# Patient Record
Sex: FEMALE | Race: WHITE | NOT HISPANIC OR LATINO | Employment: FULL TIME | URBAN - METROPOLITAN AREA
[De-identification: names, ages, dates, MRNs, and addresses within clinical notes are randomized per-mention and may not be internally consistent; named-entity substitution may affect disease eponyms.]

---

## 2018-07-23 ENCOUNTER — OFFICE VISIT CONVERTED (OUTPATIENT)
Dept: SURGERY | Facility: CLINIC | Age: 23
End: 2018-07-23
Attending: SURGERY

## 2018-07-23 ENCOUNTER — CONVERSION ENCOUNTER (OUTPATIENT)
Dept: SURGERY | Facility: CLINIC | Age: 23
End: 2018-07-23

## 2018-08-24 ENCOUNTER — OFFICE VISIT CONVERTED (OUTPATIENT)
Dept: SURGERY | Facility: CLINIC | Age: 23
End: 2018-08-24
Attending: SURGERY

## 2019-05-01 ENCOUNTER — HOSPITAL ENCOUNTER (OUTPATIENT)
Dept: URGENT CARE | Facility: CLINIC | Age: 24
Discharge: HOME | End: 2019-05-01

## 2019-06-03 ENCOUNTER — HOSPITAL ENCOUNTER (OUTPATIENT)
Dept: URGENT CARE | Facility: CLINIC | Age: 24
Discharge: HOME | End: 2019-06-03

## 2019-08-05 ENCOUNTER — HOSPITAL ENCOUNTER (OUTPATIENT)
Dept: URGENT CARE | Facility: CLINIC | Age: 24
Discharge: HOME | End: 2019-08-05

## 2019-11-25 ENCOUNTER — HOSPITAL ENCOUNTER (OUTPATIENT)
Dept: URGENT CARE | Facility: CLINIC | Age: 24
Discharge: HOME | End: 2019-11-25

## 2019-11-27 LAB — BACTERIA SPEC AEROBE CULT: NORMAL

## 2020-01-17 ENCOUNTER — HOSPITAL ENCOUNTER (OUTPATIENT)
Dept: URGENT CARE | Facility: CLINIC | Age: 25
Discharge: HOME | End: 2020-01-17
Attending: FAMILY MEDICINE

## 2020-01-19 LAB — BACTERIA SPEC AEROBE CULT: NORMAL

## 2020-09-01 ENCOUNTER — HOSPITAL ENCOUNTER (OUTPATIENT)
Dept: URGENT CARE | Facility: CLINIC | Age: 25
Discharge: HOME | End: 2020-09-01

## 2020-12-21 ENCOUNTER — HOSPITAL ENCOUNTER (OUTPATIENT)
Dept: URGENT CARE | Facility: CLINIC | Age: 25
Discharge: HOME | End: 2020-12-21

## 2020-12-23 LAB — BACTERIA SPEC AEROBE CULT: NORMAL

## 2021-05-06 ENCOUNTER — HOSPITAL ENCOUNTER (OUTPATIENT)
Dept: URGENT CARE | Facility: CLINIC | Age: 26
Discharge: HOME | End: 2021-05-06
Attending: FAMILY MEDICINE

## 2021-05-16 VITALS — BODY MASS INDEX: 38.73 KG/M2 | WEIGHT: 241 LBS | RESPIRATION RATE: 16 BRPM | HEIGHT: 66 IN

## 2021-05-16 VITALS — BODY MASS INDEX: 38.57 KG/M2 | RESPIRATION RATE: 16 BRPM | WEIGHT: 240 LBS | HEIGHT: 66 IN

## 2021-07-22 ENCOUNTER — APPOINTMENT (OUTPATIENT)
Dept: GENERAL RADIOLOGY | Facility: HOSPITAL | Age: 26
End: 2021-07-22

## 2021-07-22 ENCOUNTER — HOSPITAL ENCOUNTER (EMERGENCY)
Facility: HOSPITAL | Age: 26
Discharge: HOME | End: 2021-07-22
Attending: EMERGENCY MEDICINE | Admitting: EMERGENCY MEDICINE

## 2021-07-22 ENCOUNTER — APPOINTMENT (OUTPATIENT)
Dept: CT IMAGING | Facility: HOSPITAL | Age: 26
End: 2021-07-22

## 2021-07-22 VITALS
SYSTOLIC BLOOD PRESSURE: 115 MMHG | RESPIRATION RATE: 18 BRPM | TEMPERATURE: 98.3 F | WEIGHT: 253.09 LBS | OXYGEN SATURATION: 99 % | DIASTOLIC BLOOD PRESSURE: 68 MMHG | HEART RATE: 92 BPM | BODY MASS INDEX: 40.67 KG/M2 | HEIGHT: 66 IN

## 2021-07-22 DIAGNOSIS — F41.1 ANXIETY REACTION: ICD-10-CM

## 2021-07-22 DIAGNOSIS — R20.2 PARESTHESIAS: ICD-10-CM

## 2021-07-22 DIAGNOSIS — R07.9 CHEST PAIN, UNSPECIFIED TYPE: Primary | ICD-10-CM

## 2021-07-22 LAB
ABO GROUP BLD: NORMAL
ALBUMIN SERPL-MCNC: 4.3 G/DL (ref 3.5–5.2)
ALBUMIN/GLOB SERPL: 1.3 G/DL
ALP SERPL-CCNC: 98 U/L (ref 39–117)
ALT SERPL W P-5'-P-CCNC: 22 U/L (ref 1–33)
ANION GAP SERPL CALCULATED.3IONS-SCNC: 10.3 MMOL/L (ref 5–15)
APTT PPP: 22.8 SECONDS (ref 22.2–34.2)
AST SERPL-CCNC: 18 U/L (ref 1–32)
BACTERIA UR QL AUTO: ABNORMAL /HPF
BASOPHILS # BLD AUTO: 0.02 10*3/MM3 (ref 0–0.2)
BASOPHILS NFR BLD AUTO: 0.2 % (ref 0–1.5)
BILIRUB SERPL-MCNC: 0.3 MG/DL (ref 0–1.2)
BILIRUB UR QL STRIP: NEGATIVE
BLD GP AB SCN SERPL QL: NEGATIVE
BUN SERPL-MCNC: 13 MG/DL (ref 6–20)
BUN/CREAT SERPL: 18.3 (ref 7–25)
CALCIUM SPEC-SCNC: 9.4 MG/DL (ref 8.6–10.5)
CHLORIDE SERPL-SCNC: 101 MMOL/L (ref 98–107)
CLARITY UR: CLEAR
CO2 SERPL-SCNC: 21.7 MMOL/L (ref 22–29)
COLOR UR: YELLOW
CREAT BLDA-MCNC: 0.8 MG/DL
CREAT SERPL-MCNC: 0.71 MG/DL (ref 0.57–1)
DEPRECATED RDW RBC AUTO: 41 FL (ref 37–54)
EOSINOPHIL # BLD AUTO: 0.15 10*3/MM3 (ref 0–0.4)
EOSINOPHIL NFR BLD AUTO: 1.4 % (ref 0.3–6.2)
ERYTHROCYTE [DISTWIDTH] IN BLOOD BY AUTOMATED COUNT: 12.7 % (ref 12.3–15.4)
GFR SERPL CREATININE-BSD FRML MDRD: 101 ML/MIN/1.73
GLOBULIN UR ELPH-MCNC: 3.2 GM/DL
GLUCOSE BLDC GLUCOMTR-MCNC: 83 MG/DL (ref 70–130)
GLUCOSE SERPL-MCNC: 93 MG/DL (ref 65–99)
GLUCOSE UR STRIP-MCNC: NEGATIVE MG/DL
HCG SERPL QL: NEGATIVE
HCT VFR BLD AUTO: 40.4 % (ref 34–46.6)
HGB BLD-MCNC: 13 G/DL (ref 12–15.9)
HGB UR QL STRIP.AUTO: NEGATIVE
HOLD SPECIMEN: NORMAL
HOLD SPECIMEN: NORMAL
HYALINE CASTS UR QL AUTO: ABNORMAL /LPF
IMM GRANULOCYTES # BLD AUTO: 0.05 10*3/MM3 (ref 0–0.05)
IMM GRANULOCYTES NFR BLD AUTO: 0.5 % (ref 0–0.5)
INR PPP: 0.92 (ref 2–3)
KETONES UR QL STRIP: NEGATIVE
LEUKOCYTE ESTERASE UR QL STRIP.AUTO: ABNORMAL
LYMPHOCYTES # BLD AUTO: 2.88 10*3/MM3 (ref 0.7–3.1)
LYMPHOCYTES NFR BLD AUTO: 26.2 % (ref 19.6–45.3)
MCH RBC QN AUTO: 28.4 PG (ref 26.6–33)
MCHC RBC AUTO-ENTMCNC: 32.2 G/DL (ref 31.5–35.7)
MCV RBC AUTO: 88.2 FL (ref 79–97)
MONOCYTES # BLD AUTO: 0.58 10*3/MM3 (ref 0.1–0.9)
MONOCYTES NFR BLD AUTO: 5.3 % (ref 5–12)
NEUTROPHILS NFR BLD AUTO: 66.4 % (ref 42.7–76)
NEUTROPHILS NFR BLD AUTO: 7.32 10*3/MM3 (ref 1.7–7)
NITRITE UR QL STRIP: NEGATIVE
NRBC BLD AUTO-RTO: 0 /100 WBC (ref 0–0.2)
PH UR STRIP.AUTO: <=5 [PH] (ref 5–8)
PLATELET # BLD AUTO: 287 10*3/MM3 (ref 140–450)
PMV BLD AUTO: 10.6 FL (ref 6–12)
POTASSIUM SERPL-SCNC: 4 MMOL/L (ref 3.5–5.2)
PROT SERPL-MCNC: 7.5 G/DL (ref 6–8.5)
PROT UR QL STRIP: NEGATIVE
PROTHROMBIN TIME: 10.2 SECONDS (ref 9.4–12)
RBC # BLD AUTO: 4.58 10*6/MM3 (ref 3.77–5.28)
RBC # UR: ABNORMAL /HPF
REF LAB TEST METHOD: ABNORMAL
RH BLD: POSITIVE
SODIUM SERPL-SCNC: 133 MMOL/L (ref 136–145)
SP GR UR STRIP: >1.03 (ref 1–1.03)
SQUAMOUS #/AREA URNS HPF: ABNORMAL /HPF
T&S EXPIRATION DATE: NORMAL
TROPONIN T SERPL-MCNC: <0.01 NG/ML (ref 0–0.03)
UROBILINOGEN UR QL STRIP: ABNORMAL
WBC # BLD AUTO: 11 10*3/MM3 (ref 3.4–10.8)
WBC UR QL AUTO: ABNORMAL /HPF
WHOLE BLOOD HOLD SPECIMEN: NORMAL

## 2021-07-22 PROCEDURE — 84703 CHORIONIC GONADOTROPIN ASSAY: CPT | Performed by: EMERGENCY MEDICINE

## 2021-07-22 PROCEDURE — 82962 GLUCOSE BLOOD TEST: CPT

## 2021-07-22 PROCEDURE — 84484 ASSAY OF TROPONIN QUANT: CPT | Performed by: EMERGENCY MEDICINE

## 2021-07-22 PROCEDURE — 0 IOPAMIDOL PER 1 ML: Performed by: EMERGENCY MEDICINE

## 2021-07-22 PROCEDURE — 82565 ASSAY OF CREATININE: CPT

## 2021-07-22 PROCEDURE — 80053 COMPREHEN METABOLIC PANEL: CPT | Performed by: EMERGENCY MEDICINE

## 2021-07-22 PROCEDURE — 70498 CT ANGIOGRAPHY NECK: CPT

## 2021-07-22 PROCEDURE — 70450 CT HEAD/BRAIN W/O DYE: CPT | Performed by: RADIOLOGY

## 2021-07-22 PROCEDURE — 70498 CT ANGIOGRAPHY NECK: CPT | Performed by: RADIOLOGY

## 2021-07-22 PROCEDURE — 85025 COMPLETE CBC W/AUTO DIFF WBC: CPT | Performed by: EMERGENCY MEDICINE

## 2021-07-22 PROCEDURE — 71045 X-RAY EXAM CHEST 1 VIEW: CPT | Performed by: RADIOLOGY

## 2021-07-22 PROCEDURE — 86850 RBC ANTIBODY SCREEN: CPT | Performed by: EMERGENCY MEDICINE

## 2021-07-22 PROCEDURE — 85730 THROMBOPLASTIN TIME PARTIAL: CPT | Performed by: EMERGENCY MEDICINE

## 2021-07-22 PROCEDURE — 36415 COLL VENOUS BLD VENIPUNCTURE: CPT

## 2021-07-22 PROCEDURE — 81001 URINALYSIS AUTO W/SCOPE: CPT | Performed by: EMERGENCY MEDICINE

## 2021-07-22 PROCEDURE — 85610 PROTHROMBIN TIME: CPT | Performed by: EMERGENCY MEDICINE

## 2021-07-22 PROCEDURE — 86901 BLOOD TYPING SEROLOGIC RH(D): CPT | Performed by: EMERGENCY MEDICINE

## 2021-07-22 PROCEDURE — 71045 X-RAY EXAM CHEST 1 VIEW: CPT

## 2021-07-22 PROCEDURE — 70496 CT ANGIOGRAPHY HEAD: CPT | Performed by: RADIOLOGY

## 2021-07-22 PROCEDURE — 99284 EMERGENCY DEPT VISIT MOD MDM: CPT

## 2021-07-22 PROCEDURE — 93005 ELECTROCARDIOGRAM TRACING: CPT | Performed by: EMERGENCY MEDICINE

## 2021-07-22 PROCEDURE — 70496 CT ANGIOGRAPHY HEAD: CPT

## 2021-07-22 PROCEDURE — 70450 CT HEAD/BRAIN W/O DYE: CPT

## 2021-07-22 PROCEDURE — 86900 BLOOD TYPING SEROLOGIC ABO: CPT | Performed by: EMERGENCY MEDICINE

## 2021-07-22 PROCEDURE — 93010 ELECTROCARDIOGRAM REPORT: CPT | Performed by: INTERNAL MEDICINE

## 2021-07-22 RX ORDER — SODIUM CHLORIDE 0.9 % (FLUSH) 0.9 %
10 SYRINGE (ML) INJECTION AS NEEDED
Status: DISCONTINUED | OUTPATIENT
Start: 2021-07-22 | End: 2021-07-23 | Stop reason: HOSPADM

## 2021-07-22 RX ADMIN — IOPAMIDOL 100 ML: 755 INJECTION, SOLUTION INTRAVENOUS at 17:35

## 2021-07-22 NOTE — ED PROVIDER NOTES
Time: 17:17 EDT  Arrived by:  Private vehicle  Chief Complaint: Left side numbness/tingling, chest pain  History provided by: patient  History is limited by: N/A    History of Present Illness:  Patient is a 26 y.o. year old female that presents to the emergency department with LEFT SIDE TINGLING/NUMBNESS and CP. TINGLING is located to left side of face/lip and left arm and began at 1630. Pt describea a heaviness to her left arm.     Patient reports chest pain began about 40 minutes prior to arrival and is described as sharp and shooting pain. Pain is located on the left side. She denies shortness of breath, diaphoresis, nausea or vomiting. Patient states she has had chest pain in the past but it usually resolves on its own but today it did not go away.     Patient denies any other medical complaints.     HPI          Patient Care Team  Primary Care Provider: Alena Cooper APRN      Past Medical History:     Allergies   Allergen Reactions   • Latex Hives     Past Medical History:   Diagnosis Date   • Asthma    • Rapid heart beat      History reviewed. No pertinent surgical history.  History reviewed. No pertinent family history.    Home Medications:  Prior to Admission medications    Not on File        Social History:   PT  reports that she has never smoked. She has never used smokeless tobacco. She reports that she does not drink alcohol and does not use drugs.    Record Review:  I have reviewed the patient's records in [facility].     Review of Systems  Review of Systems   Constitutional: Negative for chills and fever.   HENT: Negative for ear discharge.    Eyes: Negative for photophobia.   Respiratory: Negative for cough and shortness of breath.    Cardiovascular: Positive for chest pain.   Gastrointestinal: Negative for abdominal pain, diarrhea, nausea and vomiting.   Genitourinary: Negative for dysuria.   Musculoskeletal: Negative for back pain and neck pain.   Skin: Negative for rash.   Neurological: Negative for  "headaches.        Tingling   All other systems reviewed and are negative.       Physical Exam  /68   Pulse 92   Temp 98.3 °F (36.8 °C) (Oral)   Resp 18   Ht 167.6 cm (66\")   Wt 115 kg (253 lb 1.4 oz)   LMP 06/28/2021 (Approximate)   SpO2 99%   Breastfeeding No   BMI 40.85 kg/m²     Physical Exam  Vitals and nursing note reviewed.   Constitutional:       General: She is not in acute distress.     Appearance: She is obese.      Comments: tearful   HENT:      Head: Normocephalic and atraumatic.   Cardiovascular:      Rate and Rhythm: Normal rate and regular rhythm.      Heart sounds: No murmur heard.     Pulmonary:      Effort: No respiratory distress.      Breath sounds: Normal breath sounds.   Abdominal:      Palpations: Abdomen is soft.      Tenderness: There is no abdominal tenderness.   Musculoskeletal:      Cervical back: Neck supple.   Skin:     General: Skin is warm and dry.      Findings: No rash.   Neurological:      General: No focal deficit present.      Mental Status: She is alert and oriented to person, place, and time.      Cranial Nerves: Cranial nerves are intact.      Sensory: Sensation is intact.      Motor: Motor function is intact.                  ED Course  /68   Pulse 92   Temp 98.3 °F (36.8 °C) (Oral)   Resp 18   Ht 167.6 cm (66\")   Wt 115 kg (253 lb 1.4 oz)   LMP 06/28/2021 (Approximate)   SpO2 99%   Breastfeeding No   BMI 40.85 kg/m²   Results for orders placed or performed during the hospital encounter of 07/22/21   hCG, Serum, Qualitative    Specimen: Blood   Result Value Ref Range    HCG Qualitative Negative Negative   Comprehensive Metabolic Panel    Specimen: Blood   Result Value Ref Range    Glucose 93 65 - 99 mg/dL    BUN 13 6 - 20 mg/dL    Creatinine 0.71 0.57 - 1.00 mg/dL    Sodium 133 (L) 136 - 145 mmol/L    Potassium 4.0 3.5 - 5.2 mmol/L    Chloride 101 98 - 107 mmol/L    CO2 21.7 (L) 22.0 - 29.0 mmol/L    Calcium 9.4 8.6 - 10.5 mg/dL    Total Protein " 7.5 6.0 - 8.5 g/dL    Albumin 4.30 3.50 - 5.20 g/dL    ALT (SGPT) 22 1 - 33 U/L    AST (SGOT) 18 1 - 32 U/L    Alkaline Phosphatase 98 39 - 117 U/L    Total Bilirubin 0.3 0.0 - 1.2 mg/dL    eGFR Non African Amer 101 >60 mL/min/1.73    Globulin 3.2 gm/dL    A/G Ratio 1.3 g/dL    BUN/Creatinine Ratio 18.3 7.0 - 25.0    Anion Gap 10.3 5.0 - 15.0 mmol/L   Protime-INR    Specimen: Blood   Result Value Ref Range    Protime 10.2 9.4 - 12.0 Seconds    INR 0.92 (L) 2.00 - 3.00   aPTT    Specimen: Blood   Result Value Ref Range    PTT 22.8 22.2 - 34.2 seconds   Troponin    Specimen: Blood   Result Value Ref Range    Troponin T <0.010 0.000 - 0.030 ng/mL   Urinalysis With Microscopic If Indicated (No Culture) - Urine, Clean Catch    Specimen: Urine, Clean Catch   Result Value Ref Range    Color, UA Yellow Yellow, Straw    Appearance, UA Clear Clear    pH, UA <=5.0 5.0 - 8.0    Specific Gravity, UA >1.030 (H) 1.005 - 1.030    Glucose, UA Negative Negative    Ketones, UA Negative Negative    Bilirubin, UA Negative Negative    Blood, UA Negative Negative    Protein, UA Negative Negative    Leuk Esterase, UA Trace (A) Negative    Nitrite, UA Negative Negative    Urobilinogen, UA 0.2 E.U./dL 0.2 - 1.0 E.U./dL   CBC Auto Differential    Specimen: Blood   Result Value Ref Range    WBC 11.00 (H) 3.40 - 10.80 10*3/mm3    RBC 4.58 3.77 - 5.28 10*6/mm3    Hemoglobin 13.0 12.0 - 15.9 g/dL    Hematocrit 40.4 34.0 - 46.6 %    MCV 88.2 79.0 - 97.0 fL    MCH 28.4 26.6 - 33.0 pg    MCHC 32.2 31.5 - 35.7 g/dL    RDW 12.7 12.3 - 15.4 %    RDW-SD 41.0 37.0 - 54.0 fl    MPV 10.6 6.0 - 12.0 fL    Platelets 287 140 - 450 10*3/mm3    Neutrophil % 66.4 42.7 - 76.0 %    Lymphocyte % 26.2 19.6 - 45.3 %    Monocyte % 5.3 5.0 - 12.0 %    Eosinophil % 1.4 0.3 - 6.2 %    Basophil % 0.2 0.0 - 1.5 %    Immature Grans % 0.5 0.0 - 0.5 %    Neutrophils, Absolute 7.32 (H) 1.70 - 7.00 10*3/mm3    Lymphocytes, Absolute 2.88 0.70 - 3.10 10*3/mm3    Monocytes,  Absolute 0.58 0.10 - 0.90 10*3/mm3    Eosinophils, Absolute 0.15 0.00 - 0.40 10*3/mm3    Basophils, Absolute 0.02 0.00 - 0.20 10*3/mm3    Immature Grans, Absolute 0.05 0.00 - 0.05 10*3/mm3    nRBC 0.0 0.0 - 0.2 /100 WBC   Urinalysis, Microscopic Only - Urine, Clean Catch    Specimen: Urine, Clean Catch   Result Value Ref Range    RBC, UA None Seen None Seen /HPF    WBC, UA 3-5 (A) None Seen /HPF    Bacteria, UA Trace (A) None Seen /HPF    Squamous Epithelial Cells, UA 7-12 (A) None Seen, 0-2 /HPF    Hyaline Casts, UA None Seen None Seen /LPF    Methodology Automated Microscopy    POC Creatinine    Specimen: Blood   Result Value Ref Range    Creatinine 0.80 mg/dL    GFR MDRD       GFR MDRD Non African American 88 mL/min/1.73 sq.M   POC Glucose Once    Specimen: Blood   Result Value Ref Range    Glucose 83 70 - 130 mg/dL   ECG 12 Lead   Result Value Ref Range    QT Interval 371 ms   Type & Screen    Specimen: Blood   Result Value Ref Range    ABO Type O     RH type Positive     Antibody Screen Negative     T&S Expiration Date 7/25/2021 11:59:59 PM    Green Top (Gel)   Result Value Ref Range    Extra Tube Hold for add-ons.    Lavender Top   Result Value Ref Range    Extra Tube hold for add-on    Hold Creatinine Tube   Result Value Ref Range    Extra Tube Hold for add-ons.      Medications   iopamidol (ISOVUE-370) 76 % injection 100 mL (100 mL Intravenous Given 7/22/21 6074)     CT Angiogram Neck    Result Date: 7/22/2021  Narrative: PROCEDURE: CT ANGIOGRAM NECK, 7/22/2021, 17:40 CT ANGIOGRAM HEAD, 7/22/2021, 17:40  COMPARISON: None  INDICATIONS: Stroke, follow up  PROTOCOL:   Standard imaging protocol performed    RADIATION:   DLP: 890.2 mGy*cm   Automated exposure control was utilized to minimize radiation dose. CONTRAST: 100 cc Isovue 370 I.V. LABS:   eGFR: >60 ml/min/1.73m2  TECHNIQUE: After obtaining the patient's consent, CT images of the neck were obtained without and with non-ionic intravenous  contrast material. Multi-planar reformatted/3-D images were created to optimize visualization of vascular anatomy. Unless otherwise stated in this report, all vascular stenoses involving the internal carotid arteries reported for this examination are derived by dividing the lesion diameter by the diameter of the normal internal carotid artery more distally.  FINDINGS:  The left tonsil is prominent.  Scattered nonenlarged lymph nodes in the neck.  Mediastinum appears unremarkable.  Lung apices appear clear.  Motion artifact in the upper thorax.  The right subclavian artery appears patent.  The left subclavian artery is patent.  Right common carotid artery appears patent.  Right internal carotid artery is patent and no high-grade stenosis in the right internal carotid artery, right middle cerebral or right anterior cerebral artery.  Left common carotid artery appears patent.  Left internal carotid artery is patent.  Left middle cerebral artery appears and left anterior cerebral artery is patent.  Right vertebral artery appears widely patent with slightly dominant left vertebral artery.  Left vertebral artery is patent.  The basilar artery is patent and bilateral posterior cerebral arteries are patent.  CONCLUSION:  1. No high-grade stenosis seen in the vertebrals, or carotid arteries.  No high-grade intracerebral stenosis. 2. The left tonsil is prominent.      TIMUR SUN MD       Electronically Signed and Approved By: TIMUR SUN MD on 7/22/2021 at 19:06             XR Chest 1 View    Result Date: 7/22/2021  Narrative: PROCEDURE: XR CHEST 1 VW  COMPARISON: [facility], CR, CHEST PA/AP & LAT 2V, 11/20/2016, 13:46.  INDICATIONS: Chest pain  FINDINGS: Single view of the chest reveals no cardiac enlargement or pleural effusion.  There is no acute airspace disease.  Mediastinal contour unremarkable.  No definitive acute osseous abnormalities are seen on this single view.   IMPRESSION: No acute  disease.     TIMUR SUN MD       Electronically Signed and Approved By: TIMUR SUN MD on 7/22/2021 at 18:40             CT Angiogram Head    Result Date: 7/22/2021  Narrative: PROCEDURE: CT ANGIOGRAM NECK, 7/22/2021, 17:40 CT ANGIOGRAM HEAD, 7/22/2021, 17:40  COMPARISON: None  INDICATIONS: Stroke, follow up  PROTOCOL:   Standard imaging protocol performed    RADIATION:   DLP: 890.2 mGy*cm   Automated exposure control was utilized to minimize radiation dose. CONTRAST: 100 cc Isovue 370 I.V. LABS:   eGFR: >60 ml/min/1.73m2  TECHNIQUE: After obtaining the patient's consent, CT images of the neck were obtained without and with non-ionic intravenous contrast material. Multi-planar reformatted/3-D images were created to optimize visualization of vascular anatomy. Unless otherwise stated in this report, all vascular stenoses involving the internal carotid arteries reported for this examination are derived by dividing the lesion diameter by the diameter of the normal internal carotid artery more distally.  FINDINGS:  The left tonsil is prominent.  Scattered nonenlarged lymph nodes in the neck.  Mediastinum appears unremarkable.  Lung apices appear clear.  Motion artifact in the upper thorax.  The right subclavian artery appears patent.  The left subclavian artery is patent.  Right common carotid artery appears patent.  Right internal carotid artery is patent and no high-grade stenosis in the right internal carotid artery, right middle cerebral or right anterior cerebral artery.  Left common carotid artery appears patent.  Left internal carotid artery is patent.  Left middle cerebral artery appears and left anterior cerebral artery is patent.  Right vertebral artery appears widely patent with slightly dominant left vertebral artery.  Left vertebral artery is patent.  The basilar artery is patent and bilateral posterior cerebral arteries are patent.  CONCLUSION:  1. No high-grade stenosis seen in the vertebrals,  or carotid arteries.  No high-grade intracerebral stenosis. 2. The left tonsil is prominent.      TIMUR SUN MD       Electronically Signed and Approved By: TIMUR SUN MD on 7/22/2021 at 19:06             CT Head Without Contrast Stroke Protocol    Result Date: 7/22/2021  Narrative: PROCEDURE: CT HEAD WO CONTRAST STROKE PROTOCOL  COMPARISON: None  INDICATIONS: Stroke, follow up, Left-side Facial Heaviness, Left Upper Extremity Numbness  FINDINGS: There is no evidence for acute intracranial hemorrhage.   There is no evidence for abnormal cerebral edema. No mass effect or midline shift is seen. The ventricular system is nondilated and the basilar cisterns are patent. The skull is intact without displaced fracture. The paranasal sinuses and mastoid air cells are clear.   IMPRESSION:   No evidence for acute intracranial abdnormality.     TIMUR SUN MD       Electronically Signed and Approved By: TIMUR SUN MD on 7/22/2021 at 17:38               17:17 EDT: Initial neurological exam performed.   17:41 EDT: At this time the patient is still down in CT.   17:58 EDT: At this time we are cancelling stroke alert.   21:22 EDT: Pt updated with results and treatment plan. Pt is stable for discharge home.       Procedures/EKGs:  Procedures    EKG:    Rhythm: Sinus  Rate: 75  Axis: Normal  Intervals: Normal  ST Segment: Normal    EKG Comparison: No old EKG    Interpreted by me        Medical Decision Making:                    NIHSS (NIH Stroke Scale/Score) reviewed and/or performed as part of the patient evaluation and treatment planning process.  The result associated with this review/performance is: 0      MDM  Number of Diagnoses or Management Options     Amount and/or Complexity of Data Reviewed  Clinical lab tests: reviewed  Tests in the radiology section of CPT®: reviewed  Tests in the medicine section of CPT®: reviewed  Decide to obtain previous medical records or to obtain history from someone  other than the patient: yes  Obtain history from someone other than the patient: yes  Review and summarize past medical records: yes       26-year-old female presents for evaluation of chest pain but also some left-sided tingling.  I suspect that these are paresthesias.  She was evaluated for stroke with negative head CT and CT angiograms and an NIH stroke scale score of 0.  Doubt stroke.  Given her emotional upset upon arrival panic attack and conversion disorder are also possibilities.  She also mentioned chest discomfort.  Doubt pulmonary embolism or aortic dissection.  Doubt ACS.  On reevaluation the patient was texting on her phone and conversing with family member.  Final diagnoses:   Chest pain, unspecified type   Anxiety reaction   Paresthesias        Disposition:  ED Disposition     ED Disposition Condition Comment    Discharge Stable           Documentation assistance provided by Khushbu Parra acting as scribe for Diallo Fernandes DO. Information recorded by the scribe was done at my direction and has been verified and validated by me.            Khushbu Parra  07/22/21 2124       Diallo Fernandes DO  07/23/21 0156

## 2021-07-22 NOTE — ED NOTES
Patient reports she has a history of anxiety, and this feels like when she has panic attacks.      Leanne Isidro RN  07/22/21 4057

## 2021-07-23 LAB — QT INTERVAL: 371 MS

## 2021-10-07 PROCEDURE — 87635 SARS-COV-2 COVID-19 AMP PRB: CPT | Performed by: NURSE PRACTITIONER

## 2021-10-21 ENCOUNTER — TRANSCRIBE ORDERS (OUTPATIENT)
Dept: ADMINISTRATIVE | Facility: HOSPITAL | Age: 26
End: 2021-10-21

## 2021-10-21 DIAGNOSIS — R22.1 NECK MASS: Primary | ICD-10-CM

## 2021-12-08 ENCOUNTER — INITIAL PRENATAL (OUTPATIENT)
Dept: OBSTETRICS AND GYNECOLOGY | Facility: CLINIC | Age: 26
End: 2021-12-08

## 2021-12-08 VITALS — BODY MASS INDEX: 42.61 KG/M2 | SYSTOLIC BLOOD PRESSURE: 119 MMHG | DIASTOLIC BLOOD PRESSURE: 66 MMHG | WEIGHT: 264 LBS

## 2021-12-08 DIAGNOSIS — O99.210 OBESITY IN PREGNANCY, ANTEPARTUM: ICD-10-CM

## 2021-12-08 DIAGNOSIS — O99.519 ASTHMA AFFECTING PREGNANCY, ANTEPARTUM: ICD-10-CM

## 2021-12-08 DIAGNOSIS — K21.9 GASTROESOPHAGEAL REFLUX DISEASE WITHOUT ESOPHAGITIS: ICD-10-CM

## 2021-12-08 DIAGNOSIS — Z34.80 SUPERVISION OF OTHER NORMAL PREGNANCY, ANTEPARTUM: Primary | ICD-10-CM

## 2021-12-08 DIAGNOSIS — J45.909 ASTHMA AFFECTING PREGNANCY, ANTEPARTUM: ICD-10-CM

## 2021-12-08 PROBLEM — L20.9 ATOPIC DERMATITIS: Status: ACTIVE | Noted: 2021-05-28

## 2021-12-08 PROBLEM — N39.3 STRESS INCONTINENCE OF URINE: Status: ACTIVE | Noted: 2018-05-07

## 2021-12-08 PROBLEM — N80.9 ENDOMETRIOSIS: Status: ACTIVE | Noted: 2018-02-28

## 2021-12-08 LAB
ABO GROUP BLD: NORMAL
B-HCG UR QL: POSITIVE
BASOPHILS # BLD AUTO: 0.02 10*3/MM3 (ref 0–0.2)
BASOPHILS NFR BLD AUTO: 0.3 % (ref 0–1.5)
BLD GP AB SCN SERPL QL: NEGATIVE
DEPRECATED RDW RBC AUTO: 39.1 FL (ref 37–54)
EOSINOPHIL # BLD AUTO: 0.15 10*3/MM3 (ref 0–0.4)
EOSINOPHIL NFR BLD AUTO: 2.1 % (ref 0.3–6.2)
ERYTHROCYTE [DISTWIDTH] IN BLOOD BY AUTOMATED COUNT: 13.1 % (ref 12.3–15.4)
EXPIRATION DATE: ABNORMAL
GLUCOSE UR STRIP-MCNC: NEGATIVE MG/DL
HBV SURFACE AG SERPL QL IA: NORMAL
HCT VFR BLD AUTO: 34.9 % (ref 34–46.6)
HCV AB SER DONR QL: NORMAL
HGB BLD-MCNC: 11.8 G/DL (ref 12–15.9)
HIV1+2 AB SER QL: NORMAL
IMM GRANULOCYTES # BLD AUTO: 0.04 10*3/MM3 (ref 0–0.05)
IMM GRANULOCYTES NFR BLD AUTO: 0.6 % (ref 0–0.5)
INTERNAL NEGATIVE CONTROL: ABNORMAL
INTERNAL POSITIVE CONTROL: ABNORMAL
LYMPHOCYTES # BLD AUTO: 1.91 10*3/MM3 (ref 0.7–3.1)
LYMPHOCYTES NFR BLD AUTO: 26.9 % (ref 19.6–45.3)
Lab: ABNORMAL
MCH RBC QN AUTO: 28.2 PG (ref 26.6–33)
MCHC RBC AUTO-ENTMCNC: 33.8 G/DL (ref 31.5–35.7)
MCV RBC AUTO: 83.3 FL (ref 79–97)
MONOCYTES # BLD AUTO: 0.58 10*3/MM3 (ref 0.1–0.9)
MONOCYTES NFR BLD AUTO: 8.2 % (ref 5–12)
NEUTROPHILS NFR BLD AUTO: 4.4 10*3/MM3 (ref 1.7–7)
NEUTROPHILS NFR BLD AUTO: 61.9 % (ref 42.7–76)
NRBC BLD AUTO-RTO: 0 /100 WBC (ref 0–0.2)
PLATELET # BLD AUTO: 237 10*3/MM3 (ref 140–450)
PMV BLD AUTO: 11.2 FL (ref 6–12)
PROT UR STRIP-MCNC: NEGATIVE MG/DL
RBC # BLD AUTO: 4.19 10*6/MM3 (ref 3.77–5.28)
RH BLD: POSITIVE
WBC NRBC COR # BLD: 7.1 10*3/MM3 (ref 3.4–10.8)

## 2021-12-08 PROCEDURE — G0123 SCREEN CERV/VAG THIN LAYER: HCPCS | Performed by: OBSTETRICS & GYNECOLOGY

## 2021-12-08 PROCEDURE — 81025 URINE PREGNANCY TEST: CPT | Performed by: OBSTETRICS & GYNECOLOGY

## 2021-12-08 PROCEDURE — G0432 EIA HIV-1/HIV-2 SCREEN: HCPCS | Performed by: OBSTETRICS & GYNECOLOGY

## 2021-12-08 PROCEDURE — 86803 HEPATITIS C AB TEST: CPT | Performed by: OBSTETRICS & GYNECOLOGY

## 2021-12-08 PROCEDURE — 83020 HEMOGLOBIN ELECTROPHORESIS: CPT | Performed by: OBSTETRICS & GYNECOLOGY

## 2021-12-08 PROCEDURE — 0501F PRENATAL FLOW SHEET: CPT | Performed by: OBSTETRICS & GYNECOLOGY

## 2021-12-08 PROCEDURE — 87591 N.GONORRHOEAE DNA AMP PROB: CPT | Performed by: OBSTETRICS & GYNECOLOGY

## 2021-12-08 PROCEDURE — 87491 CHLMYD TRACH DNA AMP PROBE: CPT | Performed by: OBSTETRICS & GYNECOLOGY

## 2021-12-08 PROCEDURE — 87086 URINE CULTURE/COLONY COUNT: CPT | Performed by: OBSTETRICS & GYNECOLOGY

## 2021-12-08 PROCEDURE — 87661 TRICHOMONAS VAGINALIS AMPLIF: CPT | Performed by: OBSTETRICS & GYNECOLOGY

## 2021-12-08 RX ORDER — FAMOTIDINE 20 MG/1
20 TABLET, FILM COATED ORAL DAILY
Qty: 30 TABLET | Refills: 9 | Status: SHIPPED | OUTPATIENT
Start: 2021-12-08 | End: 2022-03-15

## 2021-12-09 LAB — RPR SER QL: NORMAL

## 2021-12-10 PROBLEM — O99.519 ASTHMA AFFECTING PREGNANCY, ANTEPARTUM: Status: ACTIVE | Noted: 2021-12-10

## 2021-12-10 PROBLEM — J45.909 ASTHMA AFFECTING PREGNANCY, ANTEPARTUM: Status: ACTIVE | Noted: 2021-12-10

## 2021-12-10 PROBLEM — K21.9 GASTROESOPHAGEAL REFLUX DISEASE WITHOUT ESOPHAGITIS: Status: ACTIVE | Noted: 2021-12-10

## 2021-12-10 PROBLEM — O99.210 OBESITY IN PREGNANCY, ANTEPARTUM: Status: ACTIVE | Noted: 2021-12-10

## 2021-12-10 LAB
HGB A MFR BLD ELPH: 97.3 % (ref 96.4–98.8)
HGB A2 MFR BLD ELPH: 2.7 % (ref 1.8–3.2)
HGB F MFR BLD ELPH: 0 % (ref 0–2)
HGB FRACT BLD-IMP: NORMAL
HGB S MFR BLD ELPH: 0 %
RUBV IGG SERPL IA-ACNC: 1.32 INDEX

## 2021-12-10 NOTE — ASSESSMENT & PLAN NOTE
Continue prenatal vitamins  Prenatal labs  Schedule dating ultrasound  Reviewed office visit schedule and call rotation  Reviewed meds safe in pregnancy  Reviewed nutrition guidelines  Recommended Covid booster  Recommended flu vaccine  Patient undecided about prenatal genetic screening

## 2021-12-10 NOTE — PROGRESS NOTES
OB Initial Visit    CC- Here for care of current pregnancy     Subjective:  26 y.o.  presenting for her first obstetrical visit.    LMP: Patient's last menstrual period was 10/01/2021.     The patient complains of significant reflux.  She reports having reflux severely during her last pregnancy.  She requests medications to treat her symptoms.  She reports that Tums are not helping.      Reviewed and updated:  OBHx, GYNHx (STDs), PMHx, Medications, Allergies, PSHx, Social Hx, Preventative Hx (PAP), Hx of abuse/safe environment, Vaccine Hx including hx of chickenpox or vaccine, Genetic Hx (pt, FOB, both families).        Objective:  /66   Wt 120 kg (264 lb)   LMP 10/01/2021   BMI 42.61 kg/m²   General- NAD, alert and oriented, appropriate  Psych- Normal mood, good memory  Neck- No masses, no thyroid enlargement  CV- Regular rhythm, no murnurs  Resp- CTA to bases, no wheezes  Abdomen- Soft, non distended, non tender, no masses     Breast left- No mass, non tender, no nipple discharge  Breast right- No mass, non tender, no nipple discharge    External genitalia- Normal, no lesions  Urethra- Normal, no masses, non tender  Vagina- Normal, no discharge  Bladder- Normal, no masses, non tender  Cvx- Normal, no lesions, no discharge, no CMT  Uterus- Normal shape and consistency, non tender, Consistent with dates  Adnexa- Normal, no mass, non tender    Lymphatic- No palpable neck, axillary, or groin nodes  Ext- No edema, no cyanosis    Skin- No lesions, no rashes, no acanthosis nigricans      Assessment and Plan:  Diagnoses and all orders for this visit:    1. Supervision of other normal pregnancy, antepartum (Primary)  Overview:  EDC:    Undecided testing    Obesity  Asthma  Endometriosis  GERD    COVID-19 vaccination: Done  Flu vaccination: Recommended  Tdap vaccination:    Assessment & Plan:  Continue prenatal vitamins  Prenatal labs  Schedule dating ultrasound  Reviewed office visit schedule and call  rotation  Reviewed meds safe in pregnancy  Reviewed nutrition guidelines  Recommended Covid booster  Recommended flu vaccine  Patient undecided about prenatal genetic screening    Orders:  -     IGP,CtNgTv,rfx Aptima HPV ASCU  -     OB Panel With HIV  -     Urine Culture - Urine, Urine, Clean Catch  -     Hemoglobinopathy Fractionation Cascade  -     POC Urinalysis Dipstick  -     POC Pregnancy, Urine  -     US Ob < 14 Weeks Single or First Gestation; Future    2. Gastroesophageal reflux disease without esophagitis  -     famotidine (Pepcid) 20 MG tablet; Take 1 tablet by mouth Daily.  Dispense: 30 tablet; Refill: 9    3. Asthma affecting pregnancy, antepartum  Overview:  Albuterol MDI    Assessment & Plan:  Symptoms are stable.  Denies any recent exacerbations.  Continue albuterol MDI.          4. Obesity in pregnancy, antepartum  Overview:  NSTs at 36 weeks  Delivery 39 to 40 weeks    Assessment & Plan:  Discussed nutrition, exercise and appropriate weight gain in pregnancy            9w5d    Genetic Screening: Considering     Vaccines: Recommend FLU vaccine this season, R/B discussed  s/p COVID vaccine    Counseling: Nutrition discussed, calories, activity/exercise in pregnancy  Discussed dietary restrictions/safety food preparation in pregnancy  Reviewed what to expect prenatal visits, office providers  Appropriate trimester precautions provided, N/V, vag bleeding, cramping  Questions answered    Return in about 4 weeks (around 1/5/2022) for Recheck.      Redd Chambers MD  12/08/2021

## 2021-12-11 LAB
BACTERIA UR CULT: NORMAL
BACTERIA UR CULT: NORMAL

## 2021-12-14 LAB
C TRACH RRNA CVX QL NAA+PROBE: NEGATIVE
CONV .: NORMAL
CYTOLOGIST CVX/VAG CYTO: NORMAL
CYTOLOGY CVX/VAG DOC CYTO: NORMAL
CYTOLOGY CVX/VAG DOC THIN PREP: NORMAL
DX ICD CODE: NORMAL
HIV 1 & 2 AB SER-IMP: NORMAL
Lab: NORMAL
N GONORRHOEA RRNA CVX QL NAA+PROBE: NEGATIVE
OTHER STN SPEC: NORMAL
STAT OF ADQ CVX/VAG CYTO-IMP: NORMAL
T VAGINALIS RRNA SPEC QL NAA+PROBE: NEGATIVE

## 2021-12-28 LAB
C TRACH RRNA SPEC DONR QL NAA+PROBE: NORMAL
N GONORRHOEA DNA SPEC QL NAA+PROBE: NORMAL

## 2022-01-04 ENCOUNTER — ROUTINE PRENATAL (OUTPATIENT)
Dept: OBSTETRICS AND GYNECOLOGY | Facility: CLINIC | Age: 27
End: 2022-01-04

## 2022-01-04 VITALS — BODY MASS INDEX: 42.13 KG/M2 | DIASTOLIC BLOOD PRESSURE: 77 MMHG | SYSTOLIC BLOOD PRESSURE: 139 MMHG | WEIGHT: 261 LBS

## 2022-01-04 DIAGNOSIS — K21.9 GASTROESOPHAGEAL REFLUX DISEASE WITHOUT ESOPHAGITIS: ICD-10-CM

## 2022-01-04 DIAGNOSIS — O99.519 ASTHMA AFFECTING PREGNANCY, ANTEPARTUM: ICD-10-CM

## 2022-01-04 DIAGNOSIS — O99.210 OBESITY IN PREGNANCY, ANTEPARTUM: ICD-10-CM

## 2022-01-04 DIAGNOSIS — Z34.80 SUPERVISION OF OTHER NORMAL PREGNANCY, ANTEPARTUM: Primary | ICD-10-CM

## 2022-01-04 DIAGNOSIS — J45.909 ASTHMA AFFECTING PREGNANCY, ANTEPARTUM: ICD-10-CM

## 2022-01-04 LAB
GLUCOSE UR STRIP-MCNC: NEGATIVE MG/DL
PROT UR STRIP-MCNC: NEGATIVE MG/DL

## 2022-01-04 PROCEDURE — U0004 COV-19 TEST NON-CDC HGH THRU: HCPCS | Performed by: EMERGENCY MEDICINE

## 2022-01-04 PROCEDURE — 0502F SUBSEQUENT PRENATAL CARE: CPT | Performed by: OBSTETRICS & GYNECOLOGY

## 2022-01-04 NOTE — PROGRESS NOTES
OB FOLLOW UP    CC: Scheduled OB routine FU     Prenatal care complicated by:   Patient Active Problem List   Diagnosis   • Supervision of other normal pregnancy, antepartum   • Atopic dermatitis   • Endometriosis   • Stress incontinence of urine   • Obesity in pregnancy, antepartum   • Gastroesophageal reflux disease without esophagitis   • Asthma affecting pregnancy, antepartum       Subjective:   Patient has: No complaints, No leaking fluid, No vaginal bleeding  No fetal movement yet.  No pelvic pain or cramping.    Objective:  Urine glucose/protein- see flow sheet      /77   Wt 118 kg (261 lb)   LMP 10/01/2021   BMI 42.13 kg/m²   See OB flow for LE edema, and cvx exam if applicable  FHT: 167 BPM   Uterine Size: size equals dates      Assessment and Plan:  Diagnoses and all orders for this visit:    1. Supervision of other normal pregnancy, antepartum (Primary)  Overview:  EDC: 7/19/22    Declines prenatal genetic testing    Obesity  Asthma  Endometriosis  GERD    COVID-19 vaccination: Done  Flu vaccination: Recommended  Tdap vaccination:    Assessment & Plan:  Reviewed prenatal labs.  Reviewed ultrasound and final EDC.  Continue prenatal vitamins.  Anatomy ultrasound 8 weeks.    Orders:  -     POC Urinalysis Dipstick  -     US Ob Detail Fetal Anatomy Single or First Gestation; Future    2. Obesity in pregnancy, antepartum  Overview:  NSTs at 36 weeks  Delivery 39 to 40 weeks    Assessment & Plan:  Discussed exercise, nutrition and appropriate weight gain.      3. Asthma affecting pregnancy, antepartum  Overview:  Albuterol MDI    Assessment & Plan:  Symptoms are stable.  Continue albuterol MDI as needed.        4. Gastroesophageal reflux disease without esophagitis  Overview:  Pepcid 20 mg p.o. twice daily    Assessment & Plan:  Continue Pepcid.  Symptoms are stable.          13w4d  Reassuring pregnancy progress    Counseling: First trimester precautions, bleeding, cramping, N/V  Second trimester  precautions    Questions answered    Return in about 4 weeks (around 2/1/2022) for Recheck.      Redd Chambers MD  01/04/2022

## 2022-01-05 NOTE — ASSESSMENT & PLAN NOTE
Reviewed prenatal labs.  Reviewed ultrasound and final EDC.  Continue prenatal vitamins.  Anatomy ultrasound 8 weeks.

## 2022-02-02 ENCOUNTER — ROUTINE PRENATAL (OUTPATIENT)
Dept: OBSTETRICS AND GYNECOLOGY | Facility: CLINIC | Age: 27
End: 2022-02-02

## 2022-02-02 VITALS — WEIGHT: 260 LBS | DIASTOLIC BLOOD PRESSURE: 80 MMHG | BODY MASS INDEX: 41.97 KG/M2 | SYSTOLIC BLOOD PRESSURE: 121 MMHG

## 2022-02-02 DIAGNOSIS — Z34.80 SUPERVISION OF OTHER NORMAL PREGNANCY, ANTEPARTUM: Primary | ICD-10-CM

## 2022-02-02 LAB
GLUCOSE UR STRIP-MCNC: NEGATIVE MG/DL
PROT UR STRIP-MCNC: NEGATIVE MG/DL

## 2022-02-02 PROCEDURE — 0502F SUBSEQUENT PRENATAL CARE: CPT | Performed by: OBSTETRICS & GYNECOLOGY

## 2022-02-02 NOTE — PROGRESS NOTES
Chief Complaint:  Scheduled OB visit    HPI: 26 y.o.  at 16w1d   Normal baby movement    Vitals:    22 1616   BP: 121/80   Weight: 118 kg (260 lb)       See OB flowsheet also for pregnancy related data.    A/P  Intrauterine pregnancy at 16w1d   Diagnoses and all orders for this visit:    1. Supervision of other normal pregnancy, antepartum (Primary)  Overview:  EDC: 22    Declines prenatal genetic testing    Obesity  Asthma  Endometriosis  GERD    COVID-19 vaccination: Done  Flu vaccination: Recommended  Tdap vaccination:    Orders:  -     POC Urinalysis Dipstick      Continue prenatal vitamins.  Discussed round ligament pain  Discussed constipation and use of stool softeners    PLAN:   Follow-up 4 weeks    Gamaliel Santana Sr., MD  2022 16:36 EST

## 2022-03-01 ENCOUNTER — ROUTINE PRENATAL (OUTPATIENT)
Dept: OBSTETRICS AND GYNECOLOGY | Facility: CLINIC | Age: 27
End: 2022-03-01

## 2022-03-01 VITALS — DIASTOLIC BLOOD PRESSURE: 74 MMHG | BODY MASS INDEX: 41.48 KG/M2 | WEIGHT: 257 LBS | SYSTOLIC BLOOD PRESSURE: 105 MMHG

## 2022-03-01 DIAGNOSIS — J45.909 ASTHMA AFFECTING PREGNANCY, ANTEPARTUM: ICD-10-CM

## 2022-03-01 DIAGNOSIS — O99.210 OBESITY IN PREGNANCY, ANTEPARTUM: ICD-10-CM

## 2022-03-01 DIAGNOSIS — Z34.80 SUPERVISION OF OTHER NORMAL PREGNANCY, ANTEPARTUM: Primary | ICD-10-CM

## 2022-03-01 DIAGNOSIS — O99.519 ASTHMA AFFECTING PREGNANCY, ANTEPARTUM: ICD-10-CM

## 2022-03-01 LAB
GLUCOSE UR STRIP-MCNC: NEGATIVE MG/DL
PROT UR STRIP-MCNC: NEGATIVE MG/DL

## 2022-03-01 PROCEDURE — 0502F SUBSEQUENT PRENATAL CARE: CPT | Performed by: OBSTETRICS & GYNECOLOGY

## 2022-03-01 NOTE — PROGRESS NOTES
OB FOLLOW UP    CC: Scheduled OB routine FU     Prenatal care complicated by:   Patient Active Problem List   Diagnosis   • Supervision of other normal pregnancy, antepartum   • Atopic dermatitis   • Endometriosis   • Stress incontinence of urine   • Obesity in pregnancy, antepartum   • Gastroesophageal reflux disease without esophagitis   • Asthma affecting pregnancy, antepartum       Subjective:   Patient has: No complaints, No leaking fluid, No vaginal bleeding, No contractions, Adequate FM  Moved to [place], and is now looking for a provider in that area.    Objective:  Urine glucose/protein- see flow sheet      /74   Wt 117 kg (257 lb)   LMP 10/01/2021   BMI 41.48 kg/m²   See OB flow for LE edema, and cvx exam if applicable  FHT: 152 BPM   Uterine Size: 20 cm      Study Result    Date of study: 3/1/2022  Indications: Anatomy  Comparisons: None  Techniques: Transabdominal ultrasound     There is a warren intrauterine gestation in the breech presentation.  The estimated fetal weight is 310 g or 11 ounces.  The estimated gestational age is 19 weeks and 5 days, which yields an LUAN of 7/21/2022 by today's ultrasound.  This is consistent with the previously established LUAN of 7/19/2022.  The fetal heart rate is 152 bpm.  The amniotic fluid index is subjectively adequate.  The placenta is anterior and fundal, and grade 1.  There is no evidence of placenta previa.  There is a three-vessel umbilical cord with a normal-appearing cord insertion.  The cervical length is 3.6 cm by abdominal imaging.  There were limited views of the fetal face and spine due to fetal position and maternal attenuation.  No anatomical abnormalities are noted on today's study, however, the anatomical survey remains incomplete and a follow-up is recommended.     Electronically signed by Redd Chambers MD, 03/01/22, 4:38 PM EST.    Assessment and Plan:  Diagnoses and all orders for this visit:    1. Supervision of other normal  pregnancy, antepartum (Primary)  Overview:  EDC: 7/19/22    Declines prenatal genetic testing    Obesity  Asthma  Endometriosis  GERD    COVID-19 vaccination: Done  Flu vaccination: Recommended  Tdap vaccination:    Assessment & Plan:  Reviewed today's anatomy ultrasound.  The anatomical survey is incomplete.  Recommend a follow-up ultrasound in about 4 weeks.  Continue prenatal vitamins  1 hour GTT next office visit    Orders:  -     POC Urinalysis Dipstick  -     US Ob 14 + Weeks Single or First Gestation; Future    2. Obesity in pregnancy, antepartum  Overview:  NSTs at 36 weeks  Delivery 39 to 40 weeks    Assessment & Plan:  Discussed exercise, nutrition and appropriate weight gain in pregnancy.      3. Asthma affecting pregnancy, antepartum  Overview:  Albuterol MDI    Assessment & Plan:  Continue albuterol MDI as needed            20w0d  Reassuring pregnancy progress    Counseling: Second trimester precautions  OB precautions, leaking, VB, adenike doss vs PTL/Labor    Questions answered    Return in about 4 weeks (around 3/29/2022) for Recheck.      Redd Chambers MD  03/01/2022

## 2022-03-02 NOTE — ASSESSMENT & PLAN NOTE
Reviewed today's anatomy ultrasound.  The anatomical survey is incomplete.  Recommend a follow-up ultrasound in about 4 weeks.  Continue prenatal vitamins  1 hour GTT next office visit

## 2022-03-15 ENCOUNTER — INITIAL PRENATAL (OUTPATIENT)
Dept: OBSTETRICS AND GYNECOLOGY | Facility: CLINIC | Age: 27
End: 2022-03-15

## 2022-03-15 VITALS — WEIGHT: 261 LBS | SYSTOLIC BLOOD PRESSURE: 108 MMHG | DIASTOLIC BLOOD PRESSURE: 70 MMHG | BODY MASS INDEX: 42.13 KG/M2

## 2022-03-15 DIAGNOSIS — Z34.92 PRENATAL CARE IN SECOND TRIMESTER: Primary | ICD-10-CM

## 2022-03-15 DIAGNOSIS — Z36.9 ENCOUNTER FOR ANTENATAL SCREENING, UNSPECIFIED: ICD-10-CM

## 2022-03-15 DIAGNOSIS — O99.210 OBESITY IN PREGNANCY, ANTEPARTUM: ICD-10-CM

## 2022-03-15 DIAGNOSIS — K21.9 GASTROESOPHAGEAL REFLUX DISEASE WITHOUT ESOPHAGITIS: ICD-10-CM

## 2022-03-15 LAB
GLUCOSE UR STRIP-MCNC: NEGATIVE MG/DL
PROT UR STRIP-MCNC: NEGATIVE MG/DL

## 2022-03-15 PROCEDURE — 0502F SUBSEQUENT PRENATAL CARE: CPT | Performed by: NURSE PRACTITIONER

## 2022-03-15 RX ORDER — IBUPROFEN 200 MG
200 TABLET ORAL
COMMUNITY
End: 2022-03-15

## 2022-03-15 RX ORDER — FAMOTIDINE 20 MG/1
20 TABLET, FILM COATED ORAL 2 TIMES DAILY
Qty: 60 TABLET | Refills: 1 | Status: SHIPPED | OUTPATIENT
Start: 2022-03-15 | End: 2022-05-26 | Stop reason: DRUGHIGH

## 2022-03-15 NOTE — PROGRESS NOTES
OB follow up > 20 weeks    Chief Complaint   Patient presents with   • Routine Prenatal Visit       Kylee Wells is a 26 y.o.  22w0d being seen today for her obstetrical visit.  She is a transfer of care from [facility]. She recently moved from [facility] to [place]. Patient reports no complaints. She is starting to feel fetal movement. Taking prenatal vitamins: Yes      Review of Systems  Constitutional: neg fatigue  Cardiovascular: neg edema  Gastrointestinal: neg n/v  Genitourinary: Negative for contractions, cramping, vaginal bleeding, or SROM.     Fetal movement: normal  Allergies   Allergen Reactions   • Latex Hives        /70   Wt 118 kg (261 lb)   LMP 10/01/2021   BMI 42.13 kg/m²     FHT: 150s  BPM   Uterine Size: Obese abdomen        Assessment    1) pregnancy at 22w0d- Pt declined CF, SMA, FX, NIPS and AFP with her previous OB.  She had an incomplete anatomy US @ 20 weeks.      2) Flu vaccine- Encouraged the flu vaccine during pregnancy. Discuss normal physiological changes during pregnancy increase the susceptibility of the flu virus and increase the risk of severe illness for the pregnant woman. Disc flu can be harmful to the unborn baby as well. Enc the flu vaccine. Disc with patient that getting the flu vaccine is the first and most important step in protecting against the flu. Flu vaccines given during pregnancy help protect both the mother and the baby. Getting the flu vaccine during pregnancy also helps protect the  from flu illness for several months after their birth, when then are too young to get vaccinated. Also disc the importance of good hand hygiene and avoiding people who are sick. The patient declines the flu vaccine.     3)  S/P Covid vaccine x 1 ([employer]). Rec covid booster vaccine.     4) Asthma- Has albuterol inhaler for PRN use     5) Obesity- BMI 42. TWG is 6#. Will need serial growth US.  Check Hgb A1C today. Was supposed to have early 1hr GTT but has  not had yet.     6) GERD- She was taking Pepcid 20mg QD but reports she lost the RX during her move. ERX sent for BID dosing.     7) Mild anemia- Is not taking iron. Check Hgb with 2hr GTT.     8) Tdap vaccine- Disc that all pregnant women should get a Tdap shot in the third trimester, preferably between 27 weeks and 36 weeks of pregnancy. The Tdap shot is an effective and safe way to protect the baby from serious illness and complications of pertussis. Recommend that partners, family members, and infant caregivers should be up to date on theTdap vaccine if they have not previously been vaccinated. Ideally, all family members should be vaccinated at least 2 weeks before coming in contact with the . If not administered during pregnancy, the Tdap vaccine should be given immediately postpartum if the patient is not UTD on Tdap.           Plan    Continue prenatal vitamins  Reviewed this stage of pregnancy  Problem list updated   Follow up in 2 weeks for OB tummy, anatomy US and 2hr GTT     ARNOL Castellanos  3/15/2022  14:38 EDT

## 2022-03-16 LAB
AMPHETAMINES UR QL SCN: NEGATIVE NG/ML
BARBITURATES UR QL SCN: NEGATIVE NG/ML
BENZODIAZ UR QL SCN: NEGATIVE NG/ML
BZE UR QL SCN: NEGATIVE NG/ML
CANNABINOIDS UR QL SCN: NEGATIVE NG/ML
CREAT UR-MCNC: 227.6 MG/DL (ref 20–300)
HBA1C MFR BLD: 5 % (ref 4.8–5.6)
LABORATORY COMMENT REPORT: NORMAL
METHADONE UR QL SCN: NEGATIVE NG/ML
OPIATES UR QL SCN: NEGATIVE NG/ML
OXYCODONE+OXYMORPHONE UR QL SCN: NEGATIVE NG/ML
PCP UR QL: NEGATIVE NG/ML
PH UR: 5.5 [PH] (ref 4.5–8.9)
PROPOXYPH UR QL SCN: NEGATIVE NG/ML
VZV IGG SER IA-ACNC: 1317 INDEX

## 2022-03-30 ENCOUNTER — ROUTINE PRENATAL (OUTPATIENT)
Dept: OBSTETRICS AND GYNECOLOGY | Facility: CLINIC | Age: 27
End: 2022-03-30

## 2022-03-30 VITALS — SYSTOLIC BLOOD PRESSURE: 128 MMHG | WEIGHT: 262 LBS | BODY MASS INDEX: 42.29 KG/M2 | DIASTOLIC BLOOD PRESSURE: 78 MMHG

## 2022-03-30 DIAGNOSIS — O99.210 OBESITY IN PREGNANCY, ANTEPARTUM: ICD-10-CM

## 2022-03-30 DIAGNOSIS — Z36.9 ENCOUNTER FOR ANTENATAL SCREENING, UNSPECIFIED: ICD-10-CM

## 2022-03-30 DIAGNOSIS — Z34.92 PRENATAL CARE IN SECOND TRIMESTER: Primary | ICD-10-CM

## 2022-03-30 LAB
GLUCOSE UR STRIP-MCNC: NEGATIVE MG/DL
PROT UR STRIP-MCNC: NEGATIVE MG/DL

## 2022-03-30 PROCEDURE — 0502F SUBSEQUENT PRENATAL CARE: CPT | Performed by: NURSE PRACTITIONER

## 2022-03-30 NOTE — PROGRESS NOTES
OB follow up > 20 weeks    Chief Complaint   Patient presents with   • Routine Prenatal Visit       Kylee Wells is a 27 y.o.  24w1d being seen today for her obstetrical visit.  She reports good fetal movement. She has started wearing a pregnancy support belt with good relief of pelvic discomfrot. She is doing her 2hr GTT today. She has c/o sleep. Reports she falls asleep ok but wakes up shortly after.     Review of Systems  Constitutional: neg fatigue  Cardiovascular: neg edema  Gastrointestinal: neg n/v  Genitourinary: Negative for contractions, cramping, vaginal bleeding, or SROM.     Fetal movement: normal  Allergies   Allergen Reactions   • Latex Hives        /78   Wt 119 kg (262 lb)   LMP 10/01/2021   BMI 42.29 kg/m²     FHT: 140s  BPM   Uterine Size: Obese abdomen , Growth 43%        Assessment    1) pregnancy at 24w1d- Pt declined CF, SMA, FX, NIPS and AFP with her previous OB.  US IMP: Breech. Growth 43%. SAMMY 17cm. CL 4.02cm. Renal pelvis- (R) 0.32cm and (L) 0.26cm. Grade 1, post placenta.     2) Flu vaccine- Encouraged the flu vaccine during pregnancy. Discuss normal physiological changes during pregnancy increase the susceptibility of the flu virus and increase the risk of severe illness for the pregnant woman. Disc flu can be harmful to the unborn baby as well. Enc the flu vaccine. Disc with patient that getting the flu vaccine is the first and most important step in protecting against the flu. Flu vaccines given during pregnancy help protect both the mother and the baby. Getting the flu vaccine during pregnancy also helps protect the  from flu illness for several months after their birth, when then are too young to get vaccinated. Also disc the importance of good hand hygiene and avoiding people who are sick. The patient declines the flu vaccine.     3)  S/P Covid vaccine x 1 ([employer]). Rec covid booster vaccine.     4) Asthma- Has albuterol inhaler for PRN use     5)  Obesity- BMI 42. TWG is 7#. Will need serial growth US.  Growth 43% today.  Recheck growth next visit.     6) GERD- Taking Pepcid with good relief of symptoms. .     7) Mild anemia- Is not taking iron. Check Hgb with 2hr GTT.     8) Tdap vaccine- Disc that all pregnant women should get a Tdap shot in the third trimester, preferably between 27 weeks and 36 weeks of pregnancy. The Tdap shot is an effective and safe way to protect the baby from serious illness and complications of pertussis. Recommend that partners, family members, and infant caregivers should be up to date on theTdap vaccine if they have not previously been vaccinated. Ideally, all family members should be vaccinated at least 2 weeks before coming in contact with the . If not administered during pregnancy, the Tdap vaccine should be given immediately postpartum if the patient is not UTD on Tdap.       9) Sleep- Enc Tylenol PM or Unisom to help with sleep.     Plan    Continue prenatal vitamins  Reviewed this stage of pregnancy  Problem list updated   Follow up in 4 weeks for OB tummy and growth US     Jinny Meade, ARNOL  3/30/2022  11:08 EDT

## 2022-03-31 LAB
GLUCOSE 1H P 75 G GLC PO SERPL-MCNC: 204 MG/DL (ref 65–179)
GLUCOSE 2H P 75 G GLC PO SERPL-MCNC: 108 MG/DL (ref 65–152)
GLUCOSE P FAST SERPL-MCNC: 83 MG/DL (ref 65–91)
HCT VFR BLD AUTO: 33.8 % (ref 34–46.6)
HGB BLD-MCNC: 11 G/DL (ref 11.1–15.9)

## 2022-04-01 ENCOUNTER — TELEPHONE (OUTPATIENT)
Dept: OBSTETRICS AND GYNECOLOGY | Facility: CLINIC | Age: 27
End: 2022-04-01

## 2022-04-01 NOTE — TELEPHONE ENCOUNTER
Caller: DELPHINE BRODERICK    Relationship: SELF    Best call back number: [phone]    Caller requesting test results: SELF    What test was performed: GESTATIONAL DIABETES TEST     When was the test performed: 3/30/22    Where was the test performed: [facility]    Additional notes: PT SAW RESULTS ON MYCHART, BUT DOESN'T KNOW IF THEY ARE GOOD OR BAD. PLEASE CALL PT BACK TO DISCUSS RESULTS.

## 2022-04-02 DIAGNOSIS — O24.410 GDM (GESTATIONAL DIABETES MELLITUS), CLASS A1: ICD-10-CM

## 2022-04-02 DIAGNOSIS — O24.415 GESTATIONAL DIABETES MELLITUS (GDM) CONTROLLED ON ORAL HYPOGLYCEMIC DRUG, ANTEPARTUM: Primary | ICD-10-CM

## 2022-04-02 PROBLEM — O99.019 MATERNAL ANEMIA IN PREGNANCY, ANTEPARTUM: Status: ACTIVE | Noted: 2022-04-02

## 2022-04-02 RX ORDER — LANCETS 30 GAUGE
EACH MISCELLANEOUS
Qty: 120 EACH | Refills: 6 | Status: SHIPPED | OUTPATIENT
Start: 2022-04-02 | End: 2022-09-01

## 2022-04-02 RX ORDER — DOXYCYCLINE HYCLATE 50 MG/1
324 CAPSULE, GELATIN COATED ORAL 2 TIMES DAILY
Qty: 60 TABLET | Refills: 2 | Status: SHIPPED | OUTPATIENT
Start: 2022-04-02 | End: 2022-07-07

## 2022-04-02 RX ORDER — BLOOD-GLUCOSE METER
KIT MISCELLANEOUS
Qty: 1 EACH | Refills: 1 | Status: SHIPPED | OUTPATIENT
Start: 2022-04-02 | End: 2022-09-01

## 2022-04-06 ENCOUNTER — HOSPITAL ENCOUNTER (OUTPATIENT)
Dept: DIABETES SERVICES | Facility: HOSPITAL | Age: 27
Discharge: HOME | End: 2022-04-06
Admitting: NURSE PRACTITIONER

## 2022-04-06 PROCEDURE — G0108 DIAB MANAGE TRN  PER INDIV: HCPCS

## 2022-04-07 ENCOUNTER — TELEPHONE (OUTPATIENT)
Dept: OBSTETRICS AND GYNECOLOGY | Facility: CLINIC | Age: 27
End: 2022-04-07

## 2022-04-25 ENCOUNTER — REFERRAL TRIAGE (OUTPATIENT)
Dept: OBSTETRICS AND GYNECOLOGY | Facility: HOSPITAL | Age: 27
End: 2022-04-25

## 2022-04-28 ENCOUNTER — ROUTINE PRENATAL (OUTPATIENT)
Dept: OBSTETRICS AND GYNECOLOGY | Facility: CLINIC | Age: 27
End: 2022-04-28

## 2022-04-28 VITALS — SYSTOLIC BLOOD PRESSURE: 130 MMHG | DIASTOLIC BLOOD PRESSURE: 82 MMHG | BODY MASS INDEX: 42.93 KG/M2 | WEIGHT: 266 LBS

## 2022-04-28 DIAGNOSIS — K21.9 GASTROESOPHAGEAL REFLUX DISEASE WITHOUT ESOPHAGITIS: ICD-10-CM

## 2022-04-28 DIAGNOSIS — O24.410 GDM (GESTATIONAL DIABETES MELLITUS), CLASS A1: ICD-10-CM

## 2022-04-28 DIAGNOSIS — O99.519 ASTHMA AFFECTING PREGNANCY, ANTEPARTUM: ICD-10-CM

## 2022-04-28 DIAGNOSIS — Z23 NEED FOR TDAP VACCINATION: ICD-10-CM

## 2022-04-28 DIAGNOSIS — D64.9 ANEMIA, UNSPECIFIED TYPE: ICD-10-CM

## 2022-04-28 DIAGNOSIS — R53.83 OTHER FATIGUE: Primary | ICD-10-CM

## 2022-04-28 DIAGNOSIS — Z34.80 SUPERVISION OF OTHER NORMAL PREGNANCY, ANTEPARTUM: ICD-10-CM

## 2022-04-28 DIAGNOSIS — J45.909 ASTHMA AFFECTING PREGNANCY, ANTEPARTUM: ICD-10-CM

## 2022-04-28 DIAGNOSIS — O99.210 OBESITY IN PREGNANCY, ANTEPARTUM: ICD-10-CM

## 2022-04-28 DIAGNOSIS — O99.019 MATERNAL ANEMIA IN PREGNANCY, ANTEPARTUM: ICD-10-CM

## 2022-04-28 LAB
GLUCOSE UR STRIP-MCNC: NEGATIVE MG/DL
PROT UR STRIP-MCNC: NEGATIVE MG/DL

## 2022-04-28 PROCEDURE — 90715 TDAP VACCINE 7 YRS/> IM: CPT | Performed by: OBSTETRICS & GYNECOLOGY

## 2022-04-28 PROCEDURE — 90471 IMMUNIZATION ADMIN: CPT | Performed by: OBSTETRICS & GYNECOLOGY

## 2022-04-28 PROCEDURE — 0502F SUBSEQUENT PRENATAL CARE: CPT | Performed by: OBSTETRICS & GYNECOLOGY

## 2022-04-28 RX ORDER — DIPHENHYDRAMINE HCL 25 MG
TABLET ORAL 4 TIMES DAILY
COMMUNITY
Start: 2022-04-03 | End: 2022-09-01

## 2022-04-28 NOTE — PROGRESS NOTES
OB follow up     Kylee Wells is a 27 y.o.  28w2d being seen today for her obstetrical visit.  Patient reports she has significant fatigue. 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 . Fetal movement: normal. She is taking daily iron. She is agreeable to Tdap today. Her sugars are mildly elevated intermittently, but not > 50% in any time frame. We discussed a walk after dinner to help with glycemic control. This is the first time I am seeing this patient in this pregnancy and all of her problems are new to me, the examiner.       Review of Systems  No bleeding, No cramping/contractions     /82   Wt 121 kg (266 lb)   LMP 10/01/2021   BMI 42.93 kg/m²     FHT: 135 BPM   Uterine Size: 30  cm       Assessment/Plan:    1) 27 y.o.  -pregnancy at 28w2d- RH +    2) GDMA1- values are abnormal in  readings for fasting,  for breakfast pp,  for lunch pp and  for dinner pp. Suspect she will need medication at some point but will try exercise to see if that helps.  Her values are very disparate and so suspect this is diet related.     3) S>D- US today shows growth 54% (2.11#), CL= 4.1 cm, FHR= 135, posterior placenta grade 2, SAMMY= 20cm. US compared to scan on 3/30/2022.    4) Anemia- on daily iron. HgB 11.0  On 3/30/2022. Check CBC and anemia panel.     5) Fatigue- check TSH. Considering FMLA.    6) Asthma- mild, pt has prn inhaler at home, rarely needs it.     7) BMI= 42, watch growth. First infant weighed 7.6#    8) GERD- on Pepvic 20 mg BID, good symptom control    9) S/P C19 vaccine. I saw the patient with a face mask, gloves and eye protection  The patient herself was masked.  Social distancing was observed as appropriate.    10) Patient advised to have the Tdap shot in the later part of pregnancy to help protect against whooping cough.  Also advised that FOB and other adults who come in contact with the infant should also be  vaccinated with Tdap.  Tdap given today    11) Reviewed this stage of pregnancy    12)Problem list updated     13) RTO 2 weeks OBT.       Kiki Rubio MD    4/28/2022  11:45 EDT

## 2022-04-29 ENCOUNTER — TELEPHONE (OUTPATIENT)
Dept: SURGERY | Facility: CLINIC | Age: 27
End: 2022-04-29

## 2022-04-29 LAB
BASOPHILS # BLD AUTO: 0 X10E3/UL (ref 0–0.2)
BASOPHILS NFR BLD AUTO: 0 %
EOSINOPHIL # BLD AUTO: 0.1 X10E3/UL (ref 0–0.4)
EOSINOPHIL NFR BLD AUTO: 1 %
ERYTHROCYTE [DISTWIDTH] IN BLOOD BY AUTOMATED COUNT: 13.2 % (ref 11.7–15.4)
FERRITIN SERPL-MCNC: 38 NG/ML (ref 15–150)
HCT VFR BLD AUTO: 31.7 % (ref 34–46.6)
HGB A MFR BLD ELPH: 97.4 % (ref 96.4–98.8)
HGB A2 MFR BLD ELPH: 2.6 % (ref 1.8–3.2)
HGB BLD-MCNC: 10.6 G/DL (ref 11.1–15.9)
HGB F MFR BLD ELPH: 0 % (ref 0–2)
HGB FRACT BLD-IMP: NORMAL
HGB S MFR BLD ELPH: 0 %
IMM GRANULOCYTES # BLD AUTO: 0.1 X10E3/UL (ref 0–0.1)
IMM GRANULOCYTES NFR BLD AUTO: 1 %
IRON SATN MFR SERPL: 15 % (ref 15–55)
IRON SERPL-MCNC: 55 UG/DL (ref 27–159)
LYMPHOCYTES # BLD AUTO: 1.9 X10E3/UL (ref 0.7–3.1)
LYMPHOCYTES NFR BLD AUTO: 17 %
MCH RBC QN AUTO: 28 PG (ref 26.6–33)
MCHC RBC AUTO-ENTMCNC: 33.4 G/DL (ref 31.5–35.7)
MCV RBC AUTO: 84 FL (ref 79–97)
MONOCYTES # BLD AUTO: 0.5 X10E3/UL (ref 0.1–0.9)
MONOCYTES NFR BLD AUTO: 5 %
NEUTROPHILS # BLD AUTO: 8.5 X10E3/UL (ref 1.4–7)
NEUTROPHILS NFR BLD AUTO: 76 %
PLATELET # BLD AUTO: 209 X10E3/UL (ref 150–450)
RBC # BLD AUTO: 3.78 X10E6/UL (ref 3.77–5.28)
RETICS/RBC NFR AUTO: 2.8 % (ref 0.6–2.6)
TIBC SERPL-MCNC: 373 UG/DL (ref 250–450)
TSH SERPL DL<=0.005 MIU/L-ACNC: 1.45 UIU/ML (ref 0.45–4.5)
UIBC SERPL-MCNC: 318 UG/DL (ref 131–425)
WBC # BLD AUTO: 11 X10E3/UL (ref 3.4–10.8)

## 2022-04-29 NOTE — TELEPHONE ENCOUNTER
Provider: DR RICHARDSON  Caller: DELPHINE BRODERICK  Relationship to Patient: SELF  Phone Number: [phone]  Reason for Call: PATIENT CALLED AND STATED THAT SHE WAS WOKEN UP AROUND 4:00 IN THE MORNING BY SHARP PAINS IN HER LOWER ABDOMEN. SHE TRIED USING THE BATHROOM, BUT DIDN'T HAVE TO. AFTER SITTING FOR A FEW MINUTES IT WENT AWAY. SHE WOULD LIKE TO KNOW WHAT IT MIGHT HAVE BEEN AND IF SHE SHOULD BE CONCERNED.  When was the patient last seen: 4/28/22  When did it start: 4:00 A.M.  Where is it located: LOWER ABDOMEN.  Characteristics of symptom/severity: SHARP PAIN THAT WOKE HER UP. SIMILAR TO CRAMPING, BUT WORSE.  Timing- Is it constant or intermittent: WENT AWAY AFTER A FEW MINUTES.

## 2022-05-04 ENCOUNTER — TELEPHONE (OUTPATIENT)
Dept: OBSTETRICS AND GYNECOLOGY | Facility: CLINIC | Age: 27
End: 2022-05-04

## 2022-05-04 NOTE — TELEPHONE ENCOUNTER
Caller: Kylee Wells    Relationship: Self    Best call back number: [phone]    Caller requesting test results: SELF    What test was performed: RETICULOCYTE ABSOLUTE; CBC & DIFF    When was the test performed: 4/28/22    Where was the test performed: [facility]    Additional notes: PT WOULD LIKE TO DISCUSS VALUES WHICH ARE OUT OF RANGE.    PT ALSO CALLED TO DISCUSS FMLA PAPERWORK EMAILED TO [place] ON 4/28.

## 2022-05-09 RX ORDER — DOXYCYCLINE HYCLATE 50 MG/1
324 CAPSULE, GELATIN COATED ORAL
Qty: 30 TABLET | Refills: 6 | Status: SHIPPED | OUTPATIENT
Start: 2022-05-09 | End: 2022-07-07

## 2022-05-11 ENCOUNTER — ROUTINE PRENATAL (OUTPATIENT)
Dept: OBSTETRICS AND GYNECOLOGY | Facility: CLINIC | Age: 27
End: 2022-05-11

## 2022-05-11 ENCOUNTER — PATIENT OUTREACH (OUTPATIENT)
Dept: LABOR AND DELIVERY | Facility: HOSPITAL | Age: 27
End: 2022-05-11

## 2022-05-11 VITALS — DIASTOLIC BLOOD PRESSURE: 76 MMHG | SYSTOLIC BLOOD PRESSURE: 128 MMHG | BODY MASS INDEX: 43.59 KG/M2 | WEIGHT: 266 LBS

## 2022-05-11 DIAGNOSIS — O24.410 GDM (GESTATIONAL DIABETES MELLITUS), CLASS A1: Primary | ICD-10-CM

## 2022-05-11 DIAGNOSIS — K21.9 GASTROESOPHAGEAL REFLUX DISEASE WITHOUT ESOPHAGITIS: ICD-10-CM

## 2022-05-11 DIAGNOSIS — O99.019 MATERNAL ANEMIA IN PREGNANCY, ANTEPARTUM: ICD-10-CM

## 2022-05-11 LAB
GLUCOSE UR STRIP-MCNC: NEGATIVE MG/DL
PROT UR STRIP-MCNC: NEGATIVE MG/DL

## 2022-05-11 PROCEDURE — 0502F SUBSEQUENT PRENATAL CARE: CPT | Performed by: OBSTETRICS & GYNECOLOGY

## 2022-05-11 NOTE — OUTREACH NOTE
[facility]  Enrollment    Current Estimated Gestational Age: 30w1d    Questions/Answers    Flowsheet Row Responses   Would like to participate? Yes   Date of Intake Visit 05/12/22          Call pt 5/12/2022    ARNOL López  Maternity Nurse Navigator    5/11/2022, 18:05 EDT

## 2022-05-11 NOTE — PROGRESS NOTES
OB follow up     Kylee Wells is a 27 y.o.  30w1d being seen today for her obstetrical visit.  Patient reports no bleeding, no contractions and no leaking. Fetal movement: normal.  Prenatal care complicated by new diagnosis of A1 diabetes.  In addition she has GERD and takes Pepcid daily.  Her symptoms are stable.  Finally she is on iron for anemia.  Last hemoglobin was 10.6.  Review of Systems  No bleeding, No cramping/contractions     /76   Wt 121 kg (266 lb)   LMP 10/01/2021   BMI 43.59 kg/m²     FHT: present BPM   Uterine Size: 30 cm   Blood sugar log reviewed.  Appears quite good.  Patient very successful in controlling sugars with diet alone right now.    Assessment/Plan:    1) 27 y.o.  -pregnancy at 30w1d    2)   Encounter Diagnoses   Name Primary?   • GDM (gestational diabetes mellitus), class A1 Yes   • Gastroesophageal reflux disease without esophagitis    • Maternal anemia in pregnancy, antepartum    Continue Pepcid and iron daily.  Continue low-carb diet to control blood sugars for normal growth.  Repeat growth scan in 2 weeks.  Continue checking blood sugars.    3) Reviewed this stage of pregnancy  4) Problem list updated     Return in about 2 weeks (around 2022) for US, Growth, OB Alessandra.      Erasto Hopson MD    2022  13:36 EDT

## 2022-05-12 ENCOUNTER — PATIENT OUTREACH (OUTPATIENT)
Dept: LABOR AND DELIVERY | Facility: HOSPITAL | Age: 27
End: 2022-05-12

## 2022-05-12 NOTE — OUTREACH NOTE
[facility]  Intake    Current Estimated Gestational Age: 30w2d    Questions/Answers    Flowsheet Row Responses   Best Method for Contacting Cell   Do you have a dentist? No   Other Specialty Providers: Petrona referred   Resources Presently Utilizing: WIC (Women, Infant, Children)   Maternal Warning Signs Provided   Other: Provided        [facility]  Check-In    Current Estimated Gestational Age: 30w2d    Questions/Answers    Flowsheet Row Responses   Best Method for Contacting Cell   Demographics Reviewed Yes   Currently Employed Yes   Able to keep appointments as scheduled Yes   Gender(s) and Name(s) Girl, Joanna   Baby Active/Feeling Fetal Movemen Yes   How are you presently feeling? Round ligament pain   Questions regarding prenatal visits or tests to be ordered? No   Education related to new diagnoses/home equipment No   May I ask you questions about your substance use? Yes   Other Comment denies   Supplies ready for baby Breast Pump, Clothing, Diapers, Feeding Supplies   Resource/Environmental Concerns Financial  [pt has been working 1/2 as much as usual and financially it has been challenging ]   Do you have any questions related to your care experience, your pregnancy, plans for delivery, any concerns, etc? No          F/U with pt 5/31/2022 around 1300 to make sure she received everything and see if she has questions     ARNOL López  Maternity Nurse Navigator    5/12/2022, 14:31 EDT

## 2022-05-20 ENCOUNTER — HOSPITAL ENCOUNTER (OUTPATIENT)
Facility: HOSPITAL | Age: 27
Discharge: HOME | End: 2022-05-20
Attending: OBSTETRICS & GYNECOLOGY | Admitting: OBSTETRICS & GYNECOLOGY

## 2022-05-20 VITALS
SYSTOLIC BLOOD PRESSURE: 107 MMHG | TEMPERATURE: 98.2 F | HEART RATE: 96 BPM | HEIGHT: 66 IN | DIASTOLIC BLOOD PRESSURE: 59 MMHG | RESPIRATION RATE: 18 BRPM | WEIGHT: 265 LBS | BODY MASS INDEX: 42.59 KG/M2

## 2022-05-20 LAB
AMPHET+METHAMPHET UR QL: NEGATIVE
AMPHETAMINES UR QL: NEGATIVE
BACTERIA UR QL AUTO: ABNORMAL /HPF
BARBITURATES UR QL SCN: NEGATIVE
BENZODIAZ UR QL SCN: NEGATIVE
BILIRUB UR QL STRIP: NEGATIVE
BUPRENORPHINE SERPL-MCNC: NEGATIVE NG/ML
CANNABINOIDS SERPL QL: NEGATIVE
CLARITY UR: CLEAR
COCAINE UR QL: NEGATIVE
COLOR UR: YELLOW
GLUCOSE UR STRIP-MCNC: NEGATIVE MG/DL
HGB UR QL STRIP.AUTO: NEGATIVE
HYALINE CASTS UR QL AUTO: ABNORMAL /LPF
KETONES UR QL STRIP: ABNORMAL
LEUKOCYTE ESTERASE UR QL STRIP.AUTO: ABNORMAL
METHADONE UR QL SCN: NEGATIVE
NITRITE UR QL STRIP: NEGATIVE
OPIATES UR QL: NEGATIVE
OXYCODONE UR QL SCN: NEGATIVE
PCP UR QL SCN: NEGATIVE
PH UR STRIP.AUTO: 6.5 [PH] (ref 4.5–8)
PROPOXYPH UR QL: NEGATIVE
PROT UR QL STRIP: NEGATIVE
RBC # UR STRIP: ABNORMAL /HPF
REF LAB TEST METHOD: ABNORMAL
SP GR UR STRIP: 1.02 (ref 1–1.03)
SQUAMOUS #/AREA URNS HPF: ABNORMAL /HPF
TRICYCLICS UR QL SCN: NEGATIVE
UROBILINOGEN UR QL STRIP: ABNORMAL
WBC # UR STRIP: ABNORMAL /HPF

## 2022-05-20 PROCEDURE — 59025 FETAL NON-STRESS TEST: CPT | Performed by: OBSTETRICS & GYNECOLOGY

## 2022-05-20 PROCEDURE — 81001 URINALYSIS AUTO W/SCOPE: CPT | Performed by: OBSTETRICS & GYNECOLOGY

## 2022-05-20 PROCEDURE — G0463 HOSPITAL OUTPT CLINIC VISIT: HCPCS

## 2022-05-20 PROCEDURE — 80306 DRUG TEST PRSMV INSTRMNT: CPT | Performed by: OBSTETRICS & GYNECOLOGY

## 2022-05-20 PROCEDURE — 87086 URINE CULTURE/COLONY COUNT: CPT | Performed by: OBSTETRICS & GYNECOLOGY

## 2022-05-20 PROCEDURE — 59025 FETAL NON-STRESS TEST: CPT

## 2022-05-20 RX ORDER — LIDOCAINE HYDROCHLORIDE 10 MG/ML
5 INJECTION, SOLUTION EPIDURAL; INFILTRATION; INTRACAUDAL; PERINEURAL AS NEEDED
Status: DISCONTINUED | OUTPATIENT
Start: 2022-05-20 | End: 2022-05-20 | Stop reason: HOSPADM

## 2022-05-20 RX ORDER — SODIUM CHLORIDE 0.9 % (FLUSH) 0.9 %
3 SYRINGE (ML) INJECTION EVERY 12 HOURS SCHEDULED
Status: DISCONTINUED | OUTPATIENT
Start: 2022-05-20 | End: 2022-05-20 | Stop reason: HOSPADM

## 2022-05-20 RX ORDER — SODIUM CHLORIDE 0.9 % (FLUSH) 0.9 %
10 SYRINGE (ML) INJECTION AS NEEDED
Status: DISCONTINUED | OUTPATIENT
Start: 2022-05-20 | End: 2022-05-20 | Stop reason: HOSPADM

## 2022-05-20 NOTE — NON STRESS TEST
Kylee Wells, a  at 31w3d with an LUAN of 2022, by Ultrasound, was seen at [facility] for a nonstress test.    Chief Complaint   Patient presents with   • Dizziness     Pt woke up approx 1300&felt dizzy, light-headed&nauseas, called MD&was instructed to drink water, eat something salty,&lie down w/legs up, pt stated this helped only briefly, pt instructed to come to hosp for eval.       Patient Active Problem List   Diagnosis   • Supervision of other normal pregnancy, antepartum   • Atopic dermatitis   • Endometriosis   • Stress incontinence of urine   • Obesity in pregnancy, antepartum   • Gastroesophageal reflux disease without esophagitis   • Asthma affecting pregnancy, antepartum   • GDM (gestational diabetes mellitus), class A1   • Maternal anemia in pregnancy, antepartum   • Other fatigue       Start Time:   Stop Time:     Interpretation A  Nonstress Test Interpretation A: Reactive (22 : Sherice Dee RN)

## 2022-05-21 LAB — BACTERIA SPEC AEROBE CULT: NORMAL

## 2022-05-26 ENCOUNTER — ROUTINE PRENATAL (OUTPATIENT)
Dept: OBSTETRICS AND GYNECOLOGY | Facility: CLINIC | Age: 27
End: 2022-05-26

## 2022-05-26 VITALS — SYSTOLIC BLOOD PRESSURE: 130 MMHG | WEIGHT: 267 LBS | DIASTOLIC BLOOD PRESSURE: 78 MMHG | BODY MASS INDEX: 43.09 KG/M2

## 2022-05-26 DIAGNOSIS — O99.519 ASTHMA AFFECTING PREGNANCY, ANTEPARTUM: ICD-10-CM

## 2022-05-26 DIAGNOSIS — Z36.9 ENCOUNTER FOR ANTENATAL SCREENING, UNSPECIFIED: Primary | ICD-10-CM

## 2022-05-26 DIAGNOSIS — R53.83 OTHER FATIGUE: ICD-10-CM

## 2022-05-26 DIAGNOSIS — R55 POSTURAL DIZZINESS WITH PRESYNCOPE: ICD-10-CM

## 2022-05-26 DIAGNOSIS — J45.909 ASTHMA AFFECTING PREGNANCY, ANTEPARTUM: ICD-10-CM

## 2022-05-26 DIAGNOSIS — R42 POSTURAL DIZZINESS WITH PRESYNCOPE: ICD-10-CM

## 2022-05-26 DIAGNOSIS — K21.9 GASTROESOPHAGEAL REFLUX DISEASE WITHOUT ESOPHAGITIS: ICD-10-CM

## 2022-05-26 DIAGNOSIS — O24.410 GDM (GESTATIONAL DIABETES MELLITUS), CLASS A1: ICD-10-CM

## 2022-05-26 DIAGNOSIS — O99.019 MATERNAL ANEMIA IN PREGNANCY, ANTEPARTUM: ICD-10-CM

## 2022-05-26 PROBLEM — O24.419 GDM (GESTATIONAL DIABETES MELLITUS): Status: ACTIVE | Noted: 2022-05-26

## 2022-05-26 LAB
2ND TRIMESTER 4 SCREEN SERPL-IMP: NORMAL
AFP INTERP SERPL-IMP: NORMAL
EXTERNAL CYSTIC FIBROSIS: NORMAL
EXTERNAL NIPT: NORMAL
GLUCOSE UR STRIP-MCNC: NEGATIVE MG/DL
PROT UR STRIP-MCNC: NEGATIVE MG/DL

## 2022-05-26 PROCEDURE — 0502F SUBSEQUENT PRENATAL CARE: CPT | Performed by: OBSTETRICS & GYNECOLOGY

## 2022-05-26 RX ORDER — ALBUTEROL SULFATE 90 UG/1
2 AEROSOL, METERED RESPIRATORY (INHALATION) EVERY 4 HOURS PRN
Qty: 18 G | Refills: 3 | Status: SHIPPED | OUTPATIENT
Start: 2022-05-26 | End: 2023-03-13 | Stop reason: SDUPTHER

## 2022-05-26 RX ORDER — FAMOTIDINE 40 MG/1
40 TABLET, FILM COATED ORAL 2 TIMES DAILY
Qty: 60 TABLET | Refills: 6 | Status: SHIPPED | OUTPATIENT
Start: 2022-05-26

## 2022-05-26 NOTE — PROGRESS NOTES
OB follow up     Kylee Wells is a 27 y.o.  32w2d being seen today for her obstetrical visit.  Patient reports she feels unwell and wants to stop working. She has had a few presyncopal episodes and says these are worse with work. . Fetal movement: normal. She denies any chest pain, swelling, palpitations or SOA. She is also having worsening GERD despite Pepcid 20 mg BID. She declined all genetic testing at her first OB. She is on iron BID but has some diarrhea.     Review of Systems  No bleeding, No cramping/contractions     /78   Wt 121 kg (267 lb)   LMP 10/01/2021   BMI 43.09 kg/m²     FHT: 144 BPM   Uterine Size: 16  cm       Assessment/Plan:    1) 27 y.o.  -pregnancy at 32w2d- presyncopal episodes. Etiology unclear. Pt asking to start maternity leave and note given. Offered cardiology referral and she declines for now, wants to try rest first.     2) GDMA1- values are intermittently mildly abnormal.  Her values are very disparate and so suspect this is diet related. No medication indicated at present.      3) S>D- US today shows growth 50% (4.71#),  FHR= 144, posterior placenta grade 2, SAMMY= 16 cm, Vertex. US compared to scan on 2022     4) Anemia- on BID iron. HgB 10.6, check CBC and vit B12.      5) Fatigue- normalTSH.      6) Asthma- mild, refilled inhaler at home, rarely needs it.      7) Pt declined all genetic testing( transfer of care from [facility])      8) GERD- worsening, will increase to Pepcid 40 mg BID.      9) S/P C19 vaccine. I saw the patient with a face mask, gloves and eye protection  The patient herself was masked.  Social distancing was observed as appropriate.     10) S/P Tdap     11)Reviewed this stage of pregnancy    12)Problem list updated     13) RTO 2 weeks OBT      Kiki Rubio MD    2022  11:40 EDT

## 2022-05-27 ENCOUNTER — HOSPITAL ENCOUNTER (OUTPATIENT)
Facility: HOSPITAL | Age: 27
Discharge: HOME | End: 2022-05-27
Attending: OBSTETRICS & GYNECOLOGY | Admitting: OBSTETRICS & GYNECOLOGY

## 2022-05-27 VITALS
SYSTOLIC BLOOD PRESSURE: 118 MMHG | RESPIRATION RATE: 20 BRPM | TEMPERATURE: 98.3 F | HEIGHT: 66 IN | DIASTOLIC BLOOD PRESSURE: 64 MMHG | BODY MASS INDEX: 42.91 KG/M2 | HEART RATE: 96 BPM | WEIGHT: 267 LBS

## 2022-05-27 LAB
AMPHET+METHAMPHET UR QL: NEGATIVE
AMPHETAMINES UR QL: NEGATIVE
BARBITURATES UR QL SCN: NEGATIVE
BASOPHILS # BLD AUTO: 0 X10E3/UL (ref 0–0.2)
BASOPHILS NFR BLD AUTO: 0 %
BENZODIAZ UR QL SCN: NEGATIVE
BUPRENORPHINE SERPL-MCNC: NEGATIVE NG/ML
CANNABINOIDS SERPL QL: NEGATIVE
COCAINE UR QL: NEGATIVE
EOSINOPHIL # BLD AUTO: 0.1 X10E3/UL (ref 0–0.4)
EOSINOPHIL NFR BLD AUTO: 1 %
ERYTHROCYTE [DISTWIDTH] IN BLOOD BY AUTOMATED COUNT: 14 % (ref 11.7–15.4)
HCT VFR BLD AUTO: 33.9 % (ref 34–46.6)
HGB BLD-MCNC: 11.5 G/DL (ref 11.1–15.9)
IMM GRANULOCYTES # BLD AUTO: 0.1 X10E3/UL (ref 0–0.1)
IMM GRANULOCYTES NFR BLD AUTO: 1 %
LYMPHOCYTES # BLD AUTO: 1.7 X10E3/UL (ref 0.7–3.1)
LYMPHOCYTES NFR BLD AUTO: 15 %
MCH RBC QN AUTO: 28.8 PG (ref 26.6–33)
MCHC RBC AUTO-ENTMCNC: 33.9 G/DL (ref 31.5–35.7)
MCV RBC AUTO: 85 FL (ref 79–97)
METHADONE UR QL SCN: NEGATIVE
MONOCYTES # BLD AUTO: 0.6 X10E3/UL (ref 0.1–0.9)
MONOCYTES NFR BLD AUTO: 5 %
NEUTROPHILS # BLD AUTO: 9.1 X10E3/UL (ref 1.4–7)
NEUTROPHILS NFR BLD AUTO: 78 %
OPIATES UR QL: NEGATIVE
OXYCODONE UR QL SCN: NEGATIVE
PCP UR QL SCN: NEGATIVE
PLATELET # BLD AUTO: 212 X10E3/UL (ref 150–450)
PROPOXYPH UR QL: NEGATIVE
RBC # BLD AUTO: 3.99 X10E6/UL (ref 3.77–5.28)
TRICYCLICS UR QL SCN: NEGATIVE
VIT B12 SERPL-MCNC: 213 PG/ML (ref 232–1245)
WBC # BLD AUTO: 11.6 X10E3/UL (ref 3.4–10.8)

## 2022-05-27 PROCEDURE — 59025 FETAL NON-STRESS TEST: CPT | Performed by: OBSTETRICS & GYNECOLOGY

## 2022-05-27 PROCEDURE — 59025 FETAL NON-STRESS TEST: CPT

## 2022-05-27 PROCEDURE — 80306 DRUG TEST PRSMV INSTRMNT: CPT | Performed by: OBSTETRICS & GYNECOLOGY

## 2022-05-27 PROCEDURE — G0463 HOSPITAL OUTPT CLINIC VISIT: HCPCS

## 2022-05-31 ENCOUNTER — PATIENT OUTREACH (OUTPATIENT)
Dept: OBSTETRICS AND GYNECOLOGY | Facility: HOSPITAL | Age: 27
End: 2022-05-31

## 2022-05-31 RX ORDER — CYANOCOBALAMIN (VITAMIN B-12) 500 MCG
500 TABLET ORAL DAILY
Qty: 30 TABLET | Refills: 6 | Status: SHIPPED | OUTPATIENT
Start: 2022-05-31 | End: 2022-09-01

## 2022-05-31 NOTE — OUTREACH NOTE
[facility]  Unable to Reach       Questions/Answers    Flowsheet Row Responses   Pending Outreach Prenatal Check-in   Call Attempt First   Outcome Left message, MyChart message sent to patient   Next Call Attempt Date 06/23/22          F/U 6/23/2022 for 36 wk Check In     ARNOL López  Maternity Nurse Navigator    5/31/2022, 13:41 EDT   Brooks Reed(Attending)

## 2022-05-31 NOTE — PROGRESS NOTES
PIP= HgB is better at 11.5, cont iron. She also has low vit B12 and an ERX was sent in for daily use

## 2022-06-03 ENCOUNTER — TELEPHONE (OUTPATIENT)
Dept: OBSTETRICS AND GYNECOLOGY | Facility: CLINIC | Age: 27
End: 2022-06-03

## 2022-06-03 NOTE — TELEPHONE ENCOUNTER
Caller: DELPHINE BRODERICK     Relationship: SELF     Best call back number: [phone] OK TO [facility]     What form or medical record are you requesting: SHORT TERM DISABILITY     CKN ON STATUS: FAX # SHOULD BE ON FORMS     Timeframe paperwork needed: ASAP, NO LATER 6/11/22

## 2022-06-07 ENCOUNTER — ROUTINE PRENATAL (OUTPATIENT)
Dept: OBSTETRICS AND GYNECOLOGY | Facility: CLINIC | Age: 27
End: 2022-06-07

## 2022-06-07 VITALS — DIASTOLIC BLOOD PRESSURE: 76 MMHG | WEIGHT: 270 LBS | SYSTOLIC BLOOD PRESSURE: 134 MMHG | BODY MASS INDEX: 43.58 KG/M2

## 2022-06-07 DIAGNOSIS — O24.410 DIET CONTROLLED GESTATIONAL DIABETES MELLITUS (GDM) IN THIRD TRIMESTER: ICD-10-CM

## 2022-06-07 DIAGNOSIS — O36.8130 DECREASED FETAL MOVEMENTS IN THIRD TRIMESTER, SINGLE OR UNSPECIFIED FETUS: Primary | ICD-10-CM

## 2022-06-07 LAB
GLUCOSE UR STRIP-MCNC: NEGATIVE MG/DL
PROT UR STRIP-MCNC: ABNORMAL MG/DL

## 2022-06-07 PROCEDURE — 0502F SUBSEQUENT PRENATAL CARE: CPT | Performed by: NURSE PRACTITIONER

## 2022-06-07 NOTE — PROGRESS NOTES
"OB follow up     Kylee Wells is a 27 y.o.  34w0d being seen today for her obstetrical visit.  She c/o decreased fetal movement today.       Review of Systems  No bleeding, No cramping/contractions     /76   Wt 122 kg (270 lb)   LMP 10/01/2021   BMI 43.58 kg/m²     FHT: 140s BPM   Uterine Size: 35   cm       Assessment/Plan:    1) 27 y.o.  -pregnancy at 34w0d-     2) GDMA1- BS log has slight elevations most likely related to dietary choices. Reports ate a corn dog for lunch. Enc to continue working on dietary changes. No meds at this time.      3) S>D- Growth 50% (4.71#) @ 32 weeks.        4) Anemia- on BID iron. Hgb 11.5g/dL. Continue iron.      5) Fatigue- normalTSH.      6) Asthma- mild, refilled inhaler at home, rarely needs it.      7) Pt declined all genetic testing( transfer of care from [facility])      8) GERD- Taking Pepcid 40 mg BID with good relief of symptoms.      9) S/P C19 vaccine. I saw the patient with a face mask, gloves and eye protection  The patient herself was masked.  Social distancing was observed as appropriate.     10) S/P Tdap     11) Presyncopal episodes-  Reports \"not as frequent since resting more.\" Reports occurs 2-3 times per week compared to 2-3 times per day like it was prior to stopping work. Etiology unclear. Has started maternity leave. Previously declined cardiology consult.     12) Decreased fetal movement- NST reactive. Gross fetal movement noted on NST.     Problem list updated   RTO 2 weeks OBT and US growth      Jinny L Deshaun, APRN  2022  14:19 EDT          "

## 2022-06-13 ENCOUNTER — TELEPHONE (OUTPATIENT)
Dept: OBSTETRICS AND GYNECOLOGY | Facility: CLINIC | Age: 27
End: 2022-06-13

## 2022-06-13 NOTE — TELEPHONE ENCOUNTER
Caller: Kylee Wells    Relationship: Self    Best call back number: [phone]     What form or medical record are you requesting: SHORT TERM DISABILITY FORM      Who is requesting this form or medical record from you: [employer]     How would you like to receive the form or medical records (pick-up, mail, fax): FAX   If fax, what is the fax number: [phone]    Timeframe paperwork needed: ASAP    Additional notes: PT NEEDS PAPERWORK TO DATE FROM 5/26/22 TO 7/19/22. PLEASE SEND AGAIN WITH THE CURRENT DATES. PLEASE CALL PT TO LET KNOW WHEN SENT. OK TO [employer]

## 2022-06-16 ENCOUNTER — TELEPHONE (OUTPATIENT)
Dept: OBSTETRICS AND GYNECOLOGY | Facility: CLINIC | Age: 27
End: 2022-06-16

## 2022-06-16 NOTE — TELEPHONE ENCOUNTER
Caller: Kylee Wells    Relationship: Self    Best call back number: [phone]    What form or medical record are you requesting: STD FOR BEING OFF WORK DUE TO PREGNANCY COMPLICATIONS    Who is requesting this form or medical record from you: [employer]     How would you like to receive the form or medical records (pick-up, mail, fax): FAX   If fax, what is the fax number: [phone]      Timeframe paperwork needed: PT NOTES THAT THE DUE DATE WAS YESTERDAY FOR THE STD PAPERWORK     Additional notes: PT CALLED IN TO CHECK ON THE STATUS OF STD PAPER WORK DUE TO HER BEING OUT WITH PREGNANCY COMPLICATIONS. PT IS AVAILABLE ANYTIME FOR A CALL BACK AND A MESSAGE CAN BE LEFT IF SHE IS UNABLE TO ANSWER.

## 2022-06-22 ENCOUNTER — HOSPITAL ENCOUNTER (OUTPATIENT)
Facility: HOSPITAL | Age: 27
Discharge: HOME | End: 2022-06-22
Attending: OBSTETRICS & GYNECOLOGY | Admitting: OBSTETRICS & GYNECOLOGY

## 2022-06-22 ENCOUNTER — ROUTINE PRENATAL (OUTPATIENT)
Dept: OBSTETRICS AND GYNECOLOGY | Facility: CLINIC | Age: 27
End: 2022-06-22

## 2022-06-22 ENCOUNTER — HOSPITAL ENCOUNTER (OUTPATIENT)
Facility: HOSPITAL | Age: 27
End: 2022-06-22
Attending: OBSTETRICS & GYNECOLOGY | Admitting: OBSTETRICS & GYNECOLOGY

## 2022-06-22 VITALS
HEIGHT: 66 IN | BODY MASS INDEX: 42.43 KG/M2 | DIASTOLIC BLOOD PRESSURE: 77 MMHG | SYSTOLIC BLOOD PRESSURE: 129 MMHG | TEMPERATURE: 98.5 F | WEIGHT: 264 LBS | HEART RATE: 87 BPM | RESPIRATION RATE: 20 BRPM

## 2022-06-22 VITALS — WEIGHT: 265 LBS | BODY MASS INDEX: 42.77 KG/M2 | DIASTOLIC BLOOD PRESSURE: 92 MMHG | SYSTOLIC BLOOD PRESSURE: 138 MMHG

## 2022-06-22 DIAGNOSIS — R03.0 ELEVATED BP WITHOUT DIAGNOSIS OF HYPERTENSION: ICD-10-CM

## 2022-06-22 DIAGNOSIS — Z3A.36 36 WEEKS GESTATION OF PREGNANCY: ICD-10-CM

## 2022-06-22 DIAGNOSIS — Z36.9 ENCOUNTER FOR ANTENATAL SCREENING, UNSPECIFIED: Primary | ICD-10-CM

## 2022-06-22 DIAGNOSIS — O24.410 DIET CONTROLLED GESTATIONAL DIABETES MELLITUS (GDM) IN THIRD TRIMESTER: ICD-10-CM

## 2022-06-22 PROBLEM — Z34.90 PREGNANCY: Status: ACTIVE | Noted: 2022-06-22

## 2022-06-22 LAB
ALBUMIN SERPL-MCNC: 3.5 G/DL (ref 3.5–5.2)
ALBUMIN/GLOB SERPL: 1.1 G/DL
ALP SERPL-CCNC: 152 U/L (ref 39–117)
ALT SERPL W P-5'-P-CCNC: 12 U/L (ref 1–33)
AMPHET+METHAMPHET UR QL: NEGATIVE
AMPHETAMINES UR QL: NEGATIVE
ANION GAP SERPL CALCULATED.3IONS-SCNC: 13.8 MMOL/L (ref 5–15)
AST SERPL-CCNC: 11 U/L (ref 1–32)
BACTERIA UR QL AUTO: ABNORMAL /HPF
BARBITURATES UR QL SCN: NEGATIVE
BENZODIAZ UR QL SCN: NEGATIVE
BILIRUB SERPL-MCNC: 0.3 MG/DL (ref 0–1.2)
BILIRUB UR QL STRIP: ABNORMAL
BUN SERPL-MCNC: 7 MG/DL (ref 6–20)
BUN/CREAT SERPL: 10.9 (ref 7–25)
BUPRENORPHINE SERPL-MCNC: NEGATIVE NG/ML
CALCIUM SPEC-SCNC: 9.1 MG/DL (ref 8.6–10.5)
CANNABINOIDS SERPL QL: NEGATIVE
CHLORIDE SERPL-SCNC: 101 MMOL/L (ref 98–107)
CLARITY UR: CLEAR
CO2 SERPL-SCNC: 18.2 MMOL/L (ref 22–29)
COCAINE UR QL: NEGATIVE
COD CRY URNS QL: ABNORMAL /HPF
COLOR UR: ABNORMAL
CREAT SERPL-MCNC: 0.64 MG/DL (ref 0.57–1)
DEPRECATED RDW RBC AUTO: 46.9 FL (ref 37–54)
EGFRCR SERPLBLD CKD-EPI 2021: 126 ML/MIN/1.73
ERYTHROCYTE [DISTWIDTH] IN BLOOD BY AUTOMATED COUNT: 14.6 % (ref 12.3–15.4)
GLOBULIN UR ELPH-MCNC: 3.3 GM/DL
GLUCOSE SERPL-MCNC: 115 MG/DL (ref 65–99)
GLUCOSE UR STRIP-MCNC: NEGATIVE MG/DL
GLUCOSE UR STRIP-MCNC: NEGATIVE MG/DL
HCT VFR BLD AUTO: 35 % (ref 34–46.6)
HGB BLD-MCNC: 11.3 G/DL (ref 12–15.9)
HGB UR QL STRIP.AUTO: NEGATIVE
HYALINE CASTS UR QL AUTO: ABNORMAL /LPF
KETONES UR QL STRIP: ABNORMAL
LEUKOCYTE ESTERASE UR QL STRIP.AUTO: NEGATIVE
MCH RBC QN AUTO: 28 PG (ref 26.6–33)
MCHC RBC AUTO-ENTMCNC: 32.3 G/DL (ref 31.5–35.7)
MCV RBC AUTO: 86.6 FL (ref 79–97)
METHADONE UR QL SCN: NEGATIVE
MUCOUS THREADS URNS QL MICRO: ABNORMAL /HPF
NITRITE UR QL STRIP: NEGATIVE
OPIATES UR QL: NEGATIVE
OXYCODONE UR QL SCN: NEGATIVE
PCP UR QL SCN: NEGATIVE
PH UR STRIP.AUTO: 6 [PH] (ref 4.5–8)
PLATELET # BLD AUTO: 214 10*3/MM3 (ref 140–450)
PMV BLD AUTO: 10.7 FL (ref 6–12)
POTASSIUM SERPL-SCNC: 3.6 MMOL/L (ref 3.5–5.2)
PROPOXYPH UR QL: NEGATIVE
PROT SERPL-MCNC: 6.8 G/DL (ref 6–8.5)
PROT UR QL STRIP: ABNORMAL
PROT UR STRIP-MCNC: ABNORMAL MG/DL
RBC # BLD AUTO: 4.04 10*6/MM3 (ref 3.77–5.28)
RBC # UR STRIP: ABNORMAL /HPF
REF LAB TEST METHOD: ABNORMAL
SARS-COV-2 RNA PNL SPEC NAA+PROBE: NOT DETECTED
SODIUM SERPL-SCNC: 133 MMOL/L (ref 136–145)
SP GR UR STRIP: 1.04 (ref 1–1.03)
SQUAMOUS #/AREA URNS HPF: ABNORMAL /HPF
TRICYCLICS UR QL SCN: NEGATIVE
URATE SERPL-MCNC: 5 MG/DL (ref 2.4–5.7)
UROBILINOGEN UR QL STRIP: ABNORMAL
WBC # UR STRIP: ABNORMAL /HPF
WBC NRBC COR # BLD: 10.79 10*3/MM3 (ref 3.4–10.8)

## 2022-06-22 PROCEDURE — G0463 HOSPITAL OUTPT CLINIC VISIT: HCPCS

## 2022-06-22 PROCEDURE — 87635 SARS-COV-2 COVID-19 AMP PRB: CPT | Performed by: OBSTETRICS & GYNECOLOGY

## 2022-06-22 PROCEDURE — 85027 COMPLETE CBC AUTOMATED: CPT | Performed by: OBSTETRICS & GYNECOLOGY

## 2022-06-22 PROCEDURE — 59025 FETAL NON-STRESS TEST: CPT | Performed by: OBSTETRICS & GYNECOLOGY

## 2022-06-22 PROCEDURE — 81001 URINALYSIS AUTO W/SCOPE: CPT | Performed by: OBSTETRICS & GYNECOLOGY

## 2022-06-22 PROCEDURE — C9803 HOPD COVID-19 SPEC COLLECT: HCPCS

## 2022-06-22 PROCEDURE — 80306 DRUG TEST PRSMV INSTRMNT: CPT | Performed by: OBSTETRICS & GYNECOLOGY

## 2022-06-22 PROCEDURE — 80053 COMPREHEN METABOLIC PANEL: CPT | Performed by: OBSTETRICS & GYNECOLOGY

## 2022-06-22 PROCEDURE — 59025 FETAL NON-STRESS TEST: CPT

## 2022-06-22 PROCEDURE — 0502F SUBSEQUENT PRENATAL CARE: CPT | Performed by: NURSE PRACTITIONER

## 2022-06-22 PROCEDURE — 84550 ASSAY OF BLOOD/URIC ACID: CPT | Performed by: OBSTETRICS & GYNECOLOGY

## 2022-06-22 RX ORDER — SODIUM CHLORIDE, SODIUM LACTATE, POTASSIUM CHLORIDE, CALCIUM CHLORIDE 600; 310; 30; 20 MG/100ML; MG/100ML; MG/100ML; MG/100ML
125 INJECTION, SOLUTION INTRAVENOUS CONTINUOUS
Status: DISCONTINUED | OUTPATIENT
Start: 2022-06-22 | End: 2022-06-22 | Stop reason: HOSPADM

## 2022-06-22 RX ORDER — LIDOCAINE HYDROCHLORIDE 10 MG/ML
5 INJECTION, SOLUTION EPIDURAL; INFILTRATION; INTRACAUDAL; PERINEURAL AS NEEDED
Status: DISCONTINUED | OUTPATIENT
Start: 2022-06-22 | End: 2022-06-22 | Stop reason: HOSPADM

## 2022-06-22 RX ORDER — SODIUM CHLORIDE 0.9 % (FLUSH) 0.9 %
10 SYRINGE (ML) INJECTION AS NEEDED
Status: DISCONTINUED | OUTPATIENT
Start: 2022-06-22 | End: 2022-06-22 | Stop reason: HOSPADM

## 2022-06-22 RX ORDER — SODIUM CHLORIDE 0.9 % (FLUSH) 0.9 %
3 SYRINGE (ML) INJECTION EVERY 12 HOURS SCHEDULED
Status: DISCONTINUED | OUTPATIENT
Start: 2022-06-22 | End: 2022-06-22 | Stop reason: HOSPADM

## 2022-06-22 RX ORDER — LANSOPRAZOLE 30 MG/1
30 CAPSULE, DELAYED RELEASE ORAL DAILY
Qty: 30 CAPSULE | Refills: 1 | Status: ON HOLD | OUTPATIENT
Start: 2022-06-22 | End: 2022-07-12

## 2022-06-22 NOTE — PROGRESS NOTES
"OB follow up      Kylee Wells is a 27 y.o.  36w1d being seen today for her obstetrical visit.  She reports good fetal movement today. States \"today is ok but it seems like the only time I can get her to move is if I mess with her.\"     Review of Systems  No bleeding, No cramping/contractions     /92   Wt 120 kg (265 lb)   LMP 10/01/2021   BMI 42.77 kg/m²     FHT: 140s BPM   Uterine Size: Growth 62%        Assessment/Plan:    1) 27 y.o.  -pregnancy at 36w1d- GBS collected today.  US IMP: Growth 62%, EFW 6-11#. SAMMY 19cm. FHR 140s. VTX.    2) GDMA1-  Does not have BS log to view today. Reports fasting was 83 today.  Enc pt to fax or upload BS log to [employer].      3) S>D- Growth 62% today compared to growth 50% (4.71#) @ 32 weeks.        4) Anemia- on BID iron. Hgb 11.5g/dL. Continue iron.      5) Fatigue- normalTSH.      6) Asthma- mild, refilled inhaler at home, rarely needs it.      7) Pt declined all genetic testing( transfer of care from [facility])      8) GERD- Taking Pepcid 40 mg BID with good relief of symptoms.      9) S/P C19 vaccine. I saw the patient with a face mask, gloves and eye protection  The patient herself was masked.  Social distancing was observed as appropriate.     10) S/P Tdap     11) Presyncopal episodes-  Reports \"not as frequent since resting more.\" Reports occurs 2-3 times per week compared to 2-3 times per day like it was prior to stopping work. Etiology unclear. Has started maternity leave. Previously declined cardiology consult.     12) Elevated BP- Check CBC, CMP and 24 hr urine. Rev warn s/s.     Problem list updated   RTO 1 week      ARNOL Castellanos  2022  11:15 EDT          "

## 2022-06-22 NOTE — NON STRESS TEST
Kylee Wells, a  at 36w1d with an LUAN of 2022, by Ultrasound, was seen at [facility] for a nonstress test.    Chief Complaint   Patient presents with   • Decreased Fetal Movement   • Dizziness   • Headache   • Other     GDMA1- no log in office; 1+ protein in office       Patient Active Problem List   Diagnosis   • Supervision of other normal pregnancy, antepartum   • Atopic dermatitis   • Endometriosis   • Stress incontinence of urine   • Obesity in pregnancy, antepartum   • Gastroesophageal reflux disease without esophagitis   • Asthma affecting pregnancy, antepartum   • GDM (gestational diabetes mellitus), class A1   • Maternal anemia in pregnancy, antepartum   • Other fatigue   • GDM (gestational diabetes mellitus)   • Postural dizziness with presyncope   • Pregnancy       Start Time: 1220  Stop Time: 1400    Interpretation A  Nonstress Test Interpretation A: Reactive

## 2022-06-22 NOTE — NURSING NOTE
Review d/c instructions with pt. Pt instructed to keep scheduled f/u appt with [facility] office. Pt also given instructions re 24 hr urine collection and to return completed 24 hr urine collection to [facility] office tomorrow, 6/23/22. Pt verbalized understanding of d/c instructions.

## 2022-06-23 ENCOUNTER — PATIENT OUTREACH (OUTPATIENT)
Dept: LABOR AND DELIVERY | Facility: HOSPITAL | Age: 27
End: 2022-06-23

## 2022-06-23 ENCOUNTER — LAB (OUTPATIENT)
Dept: OBSTETRICS AND GYNECOLOGY | Facility: CLINIC | Age: 27
End: 2022-06-23

## 2022-06-23 DIAGNOSIS — R55 POSTURAL DIZZINESS WITH PRESYNCOPE: Primary | ICD-10-CM

## 2022-06-23 DIAGNOSIS — R42 POSTURAL DIZZINESS WITH PRESYNCOPE: Primary | ICD-10-CM

## 2022-06-23 NOTE — OUTREACH NOTE
[facility]  Unable to Reach       Questions/Answers    Flowsheet Row Responses   Pending Outreach Prenatal Check-in   Call Attempt First   Outcome Left message, MyChart message sent to patient   Next Call Attempt Date 06/27/22              ARNOL López  Maternity Nurse Navigator    6/23/2022, 16:30 EDT

## 2022-06-24 LAB
GP B STREP DNA SPEC QL NAA+PROBE: NEGATIVE
PROT 24H UR-MRATE: 207 MG/24 HR (ref 30–150)
PROT UR-MCNC: 11.5 MG/DL

## 2022-06-27 ENCOUNTER — PATIENT OUTREACH (OUTPATIENT)
Dept: LABOR AND DELIVERY | Facility: HOSPITAL | Age: 27
End: 2022-06-27

## 2022-06-27 NOTE — OUTREACH NOTE
[facility]  Unable to Reach       Questions/Answers    Flowsheet Row Responses   Pending Outreach Prenatal Check-in   Call Attempt Second   Outcome Left message   Next Call Attempt Date 06/30/22              ARNOL López  Maternity Nurse Navigator    6/27/2022, 15:25 EDT

## 2022-06-29 ENCOUNTER — ROUTINE PRENATAL (OUTPATIENT)
Dept: OBSTETRICS AND GYNECOLOGY | Facility: CLINIC | Age: 27
End: 2022-06-29

## 2022-06-29 VITALS — BODY MASS INDEX: 43.19 KG/M2 | SYSTOLIC BLOOD PRESSURE: 120 MMHG | DIASTOLIC BLOOD PRESSURE: 88 MMHG | WEIGHT: 267.6 LBS

## 2022-06-29 DIAGNOSIS — O24.410 DIET CONTROLLED GESTATIONAL DIABETES MELLITUS (GDM) IN THIRD TRIMESTER: ICD-10-CM

## 2022-06-29 DIAGNOSIS — Z3A.37 37 WEEKS GESTATION OF PREGNANCY: Primary | ICD-10-CM

## 2022-06-29 LAB
GLUCOSE UR STRIP-MCNC: NEGATIVE MG/DL
PROT UR STRIP-MCNC: ABNORMAL MG/DL

## 2022-06-29 PROCEDURE — 0502F SUBSEQUENT PRENATAL CARE: CPT | Performed by: NURSE PRACTITIONER

## 2022-06-29 NOTE — PROGRESS NOTES
"OB follow up     Kylee Wells is a 27 y.o.  37w1d being seen today for her obstetrical visit.  She reports good fetal movement today. Today she c/o pelvic pressure and feels like her stretch marks on her abdomen or tearing.     Review of Systems  No bleeding, No cramping/contractions     /88   Wt 121 kg (267 lb 9.6 oz)   LMP 10/01/2021   BMI 43.19 kg/m²     FHT: 150s  BPM   Uterine Size: 40  cm       Assessment/Plan:    1) 27 y.o.  -pregnancy at 37w1d- GBS negative.     2) GDMA1-  BS log shows good control of BS with diet.      3) S>D- Growth 62% @ 36 weeks compared to growth 50% (4.71#) @ 32 weeks.        4) Anemia- on BID iron. Hgb 11.5g/dL on 22. Continue iron.      5) Fatigue- normal TSH.      6) Asthma- mild, refilled inhaler at home, rarely needs it.      7) Pt declined all genetic testing( transfer of care from [facility])      8) GERD- Taking Pepcid 40 mg BID with good relief of symptoms.      9) S/P C19 vaccine. I saw the patient with a face mask, gloves and eye protection  The patient herself was masked.  Social distancing was observed as appropriate.     10) S/P Tdap     11) Presyncopal episodes-  Reports \"not as frequent since resting more.\" Reports occurs 2-3 times per week compared to 2-3 times per day like it was prior to stopping work. Etiology unclear. Has started maternity leave. Previously declined cardiology consult.     12) Elevated BP- Occurred at last visit. Had normal Platelets and liver enzymes. 24 hr urine- 207. BP normal with trace protein today.      13) Stretch marks- Look ok. Enc hydration with Vit E oil or lotion.     Problem list updated   RTO 1 week      Jinny Meade, ARNOL  2022  11:10 EDT          "

## 2022-06-30 ENCOUNTER — PATIENT OUTREACH (OUTPATIENT)
Dept: LABOR AND DELIVERY | Facility: HOSPITAL | Age: 27
End: 2022-06-30

## 2022-06-30 NOTE — OUTREACH NOTE
[facility]  Check-In    Current Estimated Gestational Age: 37w2d    Questions/Answers    Flowsheet Row Responses   Best Method for Contacting Cell   Demographics Reviewed Yes   Currently Employed No   Able to keep appointments as scheduled Yes   Gender(s) and Name(s) Girl, Joanna   Baby Active/Feeling Fetal Movemen Yes   How are you presently feeling? Still having fainting spells, pressure on pelvis, stretch marks are splitting   Are you having any of the following symptoms? Pressure, Headache   Questions regarding prenatal visits or tests to be ordered? No   Education related to new diagnoses/home equipment No   May I ask you questions about your substance use? Yes   Other Comment yes   Supplies ready for baby Breast Pump, Car Seat, Clothing, Crib, Diapers, Feeding Supplies   Resource/Environmental Concerns None   Do you have any questions related to your care experience, your pregnancy, plans for delivery, any concerns, etc? No        F/U at delivery LUAN 7/19/2022    ARNOL López  Maternity Nurse Navigator    6/30/2022, 15:45 EDT

## 2022-07-05 ENCOUNTER — TELEPHONE (OUTPATIENT)
Dept: OBSTETRICS AND GYNECOLOGY | Facility: CLINIC | Age: 27
End: 2022-07-05

## 2022-07-05 NOTE — TELEPHONE ENCOUNTER
Provider:   Caller: DELPHINE BRODERICK  Relationship to Patient: SELF    Phone Number: [phone]  Reason for Call: PT CALLED IN AND STATED THEY ARE NAUSEOUS. WOULD LIKE TO KNOW WHAT THEY CAN TAKE/DO TO HELP.     PT WOULD LIKE A CALL BACK

## 2022-07-07 ENCOUNTER — ROUTINE PRENATAL (OUTPATIENT)
Dept: OBSTETRICS AND GYNECOLOGY | Facility: CLINIC | Age: 27
End: 2022-07-07

## 2022-07-07 VITALS — DIASTOLIC BLOOD PRESSURE: 76 MMHG | BODY MASS INDEX: 44.06 KG/M2 | WEIGHT: 273 LBS | SYSTOLIC BLOOD PRESSURE: 120 MMHG

## 2022-07-07 DIAGNOSIS — Z3A.38 38 WEEKS GESTATION OF PREGNANCY: Primary | ICD-10-CM

## 2022-07-07 DIAGNOSIS — O99.019 MATERNAL ANEMIA IN PREGNANCY, ANTEPARTUM: ICD-10-CM

## 2022-07-07 DIAGNOSIS — O24.410 GDM (GESTATIONAL DIABETES MELLITUS), CLASS A1: ICD-10-CM

## 2022-07-07 LAB
GLUCOSE UR STRIP-MCNC: NEGATIVE MG/DL
PROT UR STRIP-MCNC: NEGATIVE MG/DL

## 2022-07-07 PROCEDURE — 0502F SUBSEQUENT PRENATAL CARE: CPT | Performed by: OBSTETRICS & GYNECOLOGY

## 2022-07-07 RX ORDER — DOXYCYCLINE HYCLATE 50 MG/1
324 CAPSULE, GELATIN COATED ORAL
Qty: 60 TABLET | Refills: 1 | Status: SHIPPED | OUTPATIENT
Start: 2022-07-07 | End: 2022-09-01

## 2022-07-07 NOTE — PROGRESS NOTES
"OB follow up     Chief Complaint: [facility] FU    Kylee Wells is a 27 y.o.  38w2d being seen today for her obstetrical visit.  Patient reports no complaints. Fetal movement: normal. Pt presents with her glucose log. She is not on any diabetes meds. She states she has not been able to get her iron refilled at pharmacy. She is still on Prevacid. Pt reporting rare presyncopal episodes.    Review of Systems  No bleeding, No cramping/contractions     /76   Wt 124 kg (273 lb)   LMP 10/01/2021   BMI 44.06 kg/m²     FHT:   present   Uterine Size:         SVE 50/-2    Assessment/Plan: Low risk pregnancy    1) pregnancy at 38w2d: GBBS negative     2) GDMA1-  BS log shows good control of BS with diet. A couple elevated values but overall well controlled. IOL at 39 weeks; favorable cervix.     3) S>D- Growth 62% @ 36 weeks       4) Anemia- on BID iron. Hgb 11.5g/dL on 22. Continue iron- new prescription sent.      6) Asthma- mild, refilled inhaler at home, rarely needs it.      7) Pt declined all genetic testing( transfer of care from [facility])      8) GERD- Taking Prevacid with good results.     9) S/P C19 vaccine. I saw the patient with a face mask, gloves and eye protection  The patient herself was masked.  Social distancing was observed as appropriate.     10) S/P Tdap      11) Presyncopal episodes-  Reports \"not as frequent since resting more.\" Episodes are rare as long as she is resting enough. Etiology unclear. Has started maternity leave. Previously declined cardiology consult.      12) Elevated BP-  Had normal Platelets and liver enzymes. 24 hr urine- 207. BP normal with negative protein today.             Reviewed this stage of pregnancy  Problem list updated   No follow-ups on file.      Rowan Guevara DO    2022  11:43 EDT      "

## 2022-07-12 ENCOUNTER — ANESTHESIA (OUTPATIENT)
Dept: OBSTETRICS AND GYNECOLOGY | Facility: HOSPITAL | Age: 27
End: 2022-07-12

## 2022-07-12 ENCOUNTER — HOSPITAL ENCOUNTER (OUTPATIENT)
Dept: LABOR AND DELIVERY | Facility: HOSPITAL | Age: 27
Discharge: HOME | End: 2022-07-12

## 2022-07-12 ENCOUNTER — ANESTHESIA EVENT (OUTPATIENT)
Dept: OBSTETRICS AND GYNECOLOGY | Facility: HOSPITAL | Age: 27
End: 2022-07-12

## 2022-07-12 ENCOUNTER — HOSPITAL ENCOUNTER (INPATIENT)
Facility: HOSPITAL | Age: 27
LOS: 2 days | Discharge: HOME | End: 2022-07-14
Attending: OBSTETRICS & GYNECOLOGY | Admitting: OBSTETRICS & GYNECOLOGY

## 2022-07-12 LAB
ABO GROUP BLD: NORMAL
AMPHET+METHAMPHET UR QL: NEGATIVE
AMPHETAMINES UR QL: NEGATIVE
BARBITURATES UR QL SCN: NEGATIVE
BENZODIAZ UR QL SCN: NEGATIVE
BLD GP AB SCN SERPL QL: NEGATIVE
BUPRENORPHINE SERPL-MCNC: NEGATIVE NG/ML
CANNABINOIDS SERPL QL: NEGATIVE
COCAINE UR QL: NEGATIVE
DEPRECATED RDW RBC AUTO: 45.8 FL (ref 37–54)
ERYTHROCYTE [DISTWIDTH] IN BLOOD BY AUTOMATED COUNT: 14.5 % (ref 12.3–15.4)
GLUCOSE BLDC GLUCOMTR-MCNC: 97 MG/DL (ref 70–130)
GLUCOSE BLDC GLUCOMTR-MCNC: 97 MG/DL (ref 70–130)
GLUCOSE BLDC GLUCOMTR-MCNC: 98 MG/DL (ref 70–130)
GLUCOSE BLDC GLUCOMTR-MCNC: 99 MG/DL (ref 70–130)
HCT VFR BLD AUTO: 34.8 % (ref 34–46.6)
HGB BLD-MCNC: 11.4 G/DL (ref 12–15.9)
MCH RBC QN AUTO: 28.6 PG (ref 26.6–33)
MCHC RBC AUTO-ENTMCNC: 32.8 G/DL (ref 31.5–35.7)
MCV RBC AUTO: 87.2 FL (ref 79–97)
METHADONE UR QL SCN: NEGATIVE
OPIATES UR QL: NEGATIVE
OXYCODONE UR QL SCN: NEGATIVE
PCP UR QL SCN: NEGATIVE
PLATELET # BLD AUTO: 229 10*3/MM3 (ref 140–450)
PMV BLD AUTO: 10.9 FL (ref 6–12)
PROPOXYPH UR QL: NEGATIVE
RBC # BLD AUTO: 3.99 10*6/MM3 (ref 3.77–5.28)
RH BLD: POSITIVE
SARS-COV-2 RNA PNL SPEC NAA+PROBE: NOT DETECTED
T&S EXPIRATION DATE: NORMAL
TRICYCLICS UR QL SCN: NEGATIVE
WBC NRBC COR # BLD: 12.6 10*3/MM3 (ref 3.4–10.8)

## 2022-07-12 PROCEDURE — 10907ZC DRAINAGE OF AMNIOTIC FLUID, THERAPEUTIC FROM PRODUCTS OF CONCEPTION, VIA NATURAL OR ARTIFICIAL OPENING: ICD-10-PCS | Performed by: OBSTETRICS & GYNECOLOGY

## 2022-07-12 PROCEDURE — 85027 COMPLETE CBC AUTOMATED: CPT | Performed by: OBSTETRICS & GYNECOLOGY

## 2022-07-12 PROCEDURE — 86850 RBC ANTIBODY SCREEN: CPT | Performed by: OBSTETRICS & GYNECOLOGY

## 2022-07-12 PROCEDURE — 86900 BLOOD TYPING SEROLOGIC ABO: CPT | Performed by: OBSTETRICS & GYNECOLOGY

## 2022-07-12 PROCEDURE — 82962 GLUCOSE BLOOD TEST: CPT

## 2022-07-12 PROCEDURE — 80306 DRUG TEST PRSMV INSTRMNT: CPT | Performed by: OBSTETRICS & GYNECOLOGY

## 2022-07-12 PROCEDURE — C1755 CATHETER, INTRASPINAL: HCPCS | Performed by: NURSE ANESTHETIST, CERTIFIED REGISTERED

## 2022-07-12 PROCEDURE — 87635 SARS-COV-2 COVID-19 AMP PRB: CPT | Performed by: OBSTETRICS & GYNECOLOGY

## 2022-07-12 PROCEDURE — 59400 OBSTETRICAL CARE: CPT | Performed by: OBSTETRICS & GYNECOLOGY

## 2022-07-12 PROCEDURE — 86901 BLOOD TYPING SEROLOGIC RH(D): CPT | Performed by: OBSTETRICS & GYNECOLOGY

## 2022-07-12 RX ORDER — METHYLERGONOVINE MALEATE 0.2 MG/ML
200 INJECTION INTRAVENOUS ONCE AS NEEDED
Status: DISCONTINUED | OUTPATIENT
Start: 2022-07-12 | End: 2022-07-14 | Stop reason: HOSPADM

## 2022-07-12 RX ORDER — HYDROCODONE BITARTRATE AND ACETAMINOPHEN 5; 325 MG/1; MG/1
1 TABLET ORAL EVERY 4 HOURS PRN
Status: DISCONTINUED | OUTPATIENT
Start: 2022-07-12 | End: 2022-07-14 | Stop reason: HOSPADM

## 2022-07-12 RX ORDER — ONDANSETRON 4 MG/1
4 TABLET, FILM COATED ORAL EVERY 8 HOURS PRN
Status: DISCONTINUED | OUTPATIENT
Start: 2022-07-12 | End: 2022-07-14 | Stop reason: HOSPADM

## 2022-07-12 RX ORDER — FENTANYL 0.2 MG/100ML-BUPIV 0.125%-NACL 0.9% EPIDURAL INJ 2/0.125 MCG/ML-%
SOLUTION INJECTION CONTINUOUS
Status: DISCONTINUED | OUTPATIENT
Start: 2022-07-12 | End: 2022-07-12

## 2022-07-12 RX ORDER — LIDOCAINE HYDROCHLORIDE AND EPINEPHRINE 15; 5 MG/ML; UG/ML
INJECTION, SOLUTION EPIDURAL AS NEEDED
Status: DISCONTINUED | OUTPATIENT
Start: 2022-07-12 | End: 2022-07-12 | Stop reason: SURG

## 2022-07-12 RX ORDER — SODIUM CHLORIDE, SODIUM LACTATE, POTASSIUM CHLORIDE, CALCIUM CHLORIDE 600; 310; 30; 20 MG/100ML; MG/100ML; MG/100ML; MG/100ML
125 INJECTION, SOLUTION INTRAVENOUS CONTINUOUS
Status: DISCONTINUED | OUTPATIENT
Start: 2022-07-12 | End: 2022-07-14 | Stop reason: HOSPADM

## 2022-07-12 RX ORDER — BISACODYL 10 MG
10 SUPPOSITORY, RECTAL RECTAL DAILY PRN
Status: DISCONTINUED | OUTPATIENT
Start: 2022-07-13 | End: 2022-07-14 | Stop reason: HOSPADM

## 2022-07-12 RX ORDER — SODIUM CHLORIDE 0.9 % (FLUSH) 0.9 %
10 SYRINGE (ML) INJECTION AS NEEDED
Status: DISCONTINUED | OUTPATIENT
Start: 2022-07-12 | End: 2022-07-12

## 2022-07-12 RX ORDER — ONDANSETRON 2 MG/ML
4 INJECTION INTRAMUSCULAR; INTRAVENOUS EVERY 6 HOURS PRN
Status: DISCONTINUED | OUTPATIENT
Start: 2022-07-12 | End: 2022-07-14 | Stop reason: HOSPADM

## 2022-07-12 RX ORDER — MISOPROSTOL 200 UG/1
800 TABLET ORAL ONCE AS NEEDED
Status: COMPLETED | OUTPATIENT
Start: 2022-07-12 | End: 2022-07-12

## 2022-07-12 RX ORDER — FENTANYL 0.2 MG/100ML-BUPIV 0.125%-NACL 0.9% EPIDURAL INJ 2/0.125 MCG/ML-%
SOLUTION INJECTION
Status: COMPLETED
Start: 2022-07-12 | End: 2022-07-12

## 2022-07-12 RX ORDER — OXYTOCIN/0.9 % SODIUM CHLORIDE 30/500 ML
85 PLASTIC BAG, INJECTION (ML) INTRAVENOUS ONCE
Status: DISCONTINUED | OUTPATIENT
Start: 2022-07-12 | End: 2022-07-14 | Stop reason: HOSPADM

## 2022-07-12 RX ORDER — OXYTOCIN/0.9 % SODIUM CHLORIDE 30/500 ML
650 PLASTIC BAG, INJECTION (ML) INTRAVENOUS ONCE
Status: DISCONTINUED | OUTPATIENT
Start: 2022-07-12 | End: 2022-07-14 | Stop reason: HOSPADM

## 2022-07-12 RX ORDER — ALBUTEROL SULFATE 2.5 MG/3ML
2.5 SOLUTION RESPIRATORY (INHALATION) EVERY 6 HOURS PRN
Refills: 3 | Status: DISCONTINUED | OUTPATIENT
Start: 2022-07-12 | End: 2022-07-14 | Stop reason: HOSPADM

## 2022-07-12 RX ORDER — IBUPROFEN 800 MG/1
800 TABLET ORAL EVERY 8 HOURS PRN
Status: DISCONTINUED | OUTPATIENT
Start: 2022-07-12 | End: 2022-07-14 | Stop reason: HOSPADM

## 2022-07-12 RX ORDER — OXYTOCIN/0.9 % SODIUM CHLORIDE 30/500 ML
2-20 PLASTIC BAG, INJECTION (ML) INTRAVENOUS
Status: DISCONTINUED | OUTPATIENT
Start: 2022-07-12 | End: 2022-07-12

## 2022-07-12 RX ORDER — PRENATAL VIT/IRON FUM/FOLIC AC 27MG-0.8MG
1 TABLET ORAL DAILY
Status: DISCONTINUED | OUTPATIENT
Start: 2022-07-12 | End: 2022-07-14 | Stop reason: HOSPADM

## 2022-07-12 RX ORDER — FERROUS GLUCONATE 324(37.5)
324 TABLET ORAL
Status: DISCONTINUED | OUTPATIENT
Start: 2022-07-12 | End: 2022-07-14 | Stop reason: HOSPADM

## 2022-07-12 RX ORDER — SODIUM CHLORIDE 0.9 % (FLUSH) 0.9 %
10 SYRINGE (ML) INJECTION EVERY 12 HOURS SCHEDULED
Status: DISCONTINUED | OUTPATIENT
Start: 2022-07-12 | End: 2022-07-12

## 2022-07-12 RX ORDER — METHYLERGONOVINE MALEATE 0.2 MG/ML
200 INJECTION INTRAVENOUS ONCE AS NEEDED
Status: DISCONTINUED | OUTPATIENT
Start: 2022-07-12 | End: 2022-07-12

## 2022-07-12 RX ORDER — OXYTOCIN/0.9 % SODIUM CHLORIDE 30/500 ML
650 PLASTIC BAG, INJECTION (ML) INTRAVENOUS ONCE
Status: COMPLETED | OUTPATIENT
Start: 2022-07-12 | End: 2022-07-12

## 2022-07-12 RX ORDER — SODIUM CHLORIDE 0.9 % (FLUSH) 0.9 %
1-10 SYRINGE (ML) INJECTION AS NEEDED
Status: DISCONTINUED | OUTPATIENT
Start: 2022-07-12 | End: 2022-07-14 | Stop reason: HOSPADM

## 2022-07-12 RX ORDER — MISOPROSTOL 200 UG/1
TABLET ORAL
Status: COMPLETED
Start: 2022-07-12 | End: 2022-07-12

## 2022-07-12 RX ORDER — DIPHENHYDRAMINE HCL 25 MG
25 CAPSULE ORAL NIGHTLY PRN
Status: DISCONTINUED | OUTPATIENT
Start: 2022-07-12 | End: 2022-07-14 | Stop reason: HOSPADM

## 2022-07-12 RX ORDER — OXYTOCIN/0.9 % SODIUM CHLORIDE 30/500 ML
85 PLASTIC BAG, INJECTION (ML) INTRAVENOUS ONCE
Status: COMPLETED | OUTPATIENT
Start: 2022-07-12 | End: 2022-07-12

## 2022-07-12 RX ORDER — HYDROCORTISONE 25 MG/G
1 CREAM TOPICAL AS NEEDED
Status: DISCONTINUED | OUTPATIENT
Start: 2022-07-12 | End: 2022-07-14 | Stop reason: HOSPADM

## 2022-07-12 RX ORDER — DOCUSATE SODIUM 100 MG/1
100 CAPSULE, LIQUID FILLED ORAL 2 TIMES DAILY
Status: DISCONTINUED | OUTPATIENT
Start: 2022-07-12 | End: 2022-07-14 | Stop reason: HOSPADM

## 2022-07-12 RX ORDER — FAMOTIDINE 20 MG/1
40 TABLET, FILM COATED ORAL 2 TIMES DAILY
Status: DISCONTINUED | OUTPATIENT
Start: 2022-07-12 | End: 2022-07-14 | Stop reason: HOSPADM

## 2022-07-12 RX ORDER — MISOPROSTOL 200 UG/1
600 TABLET ORAL ONCE AS NEEDED
Status: DISCONTINUED | OUTPATIENT
Start: 2022-07-12 | End: 2022-07-14 | Stop reason: HOSPADM

## 2022-07-12 RX ORDER — PROMETHAZINE HYDROCHLORIDE 25 MG/1
12.5 TABLET ORAL EVERY 4 HOURS PRN
Status: DISCONTINUED | OUTPATIENT
Start: 2022-07-12 | End: 2022-07-14 | Stop reason: HOSPADM

## 2022-07-12 RX ORDER — CARBOPROST TROMETHAMINE 250 UG/ML
250 INJECTION, SOLUTION INTRAMUSCULAR
Status: DISCONTINUED | OUTPATIENT
Start: 2022-07-12 | End: 2022-07-12

## 2022-07-12 RX ORDER — CALCIUM CARBONATE 200(500)MG
2 TABLET,CHEWABLE ORAL 3 TIMES DAILY PRN
Status: DISCONTINUED | OUTPATIENT
Start: 2022-07-12 | End: 2022-07-14 | Stop reason: HOSPADM

## 2022-07-12 RX ORDER — EPHEDRINE SULFATE 50 MG/ML
10 INJECTION, SOLUTION INTRAVENOUS
Status: DISCONTINUED | OUTPATIENT
Start: 2022-07-12 | End: 2022-07-12

## 2022-07-12 RX ORDER — LIDOCAINE HYDROCHLORIDE 10 MG/ML
5 INJECTION, SOLUTION EPIDURAL; INFILTRATION; INTRACAUDAL; PERINEURAL AS NEEDED
Status: DISCONTINUED | OUTPATIENT
Start: 2022-07-12 | End: 2022-07-12

## 2022-07-12 RX ADMIN — SODIUM CHLORIDE, POTASSIUM CHLORIDE, SODIUM LACTATE AND CALCIUM CHLORIDE 1000 ML: 600; 310; 30; 20 INJECTION, SOLUTION INTRAVENOUS at 10:45

## 2022-07-12 RX ADMIN — IBUPROFEN 800 MG: 800 TABLET, FILM COATED ORAL at 16:03

## 2022-07-12 RX ADMIN — OXYTOCIN-SODIUM CHLORIDE 0.9% IV SOLN 30 UNIT/500ML 4 MILLI-UNITS/MIN: 30-0.9/5 SOLUTION at 07:19

## 2022-07-12 RX ADMIN — LIDOCAINE HYDROCHLORIDE AND EPINEPHRINE 2 ML: 15; 5 INJECTION, SOLUTION EPIDURAL at 12:13

## 2022-07-12 RX ADMIN — FAMOTIDINE 40 MG: 20 TABLET ORAL at 19:47

## 2022-07-12 RX ADMIN — MISOPROSTOL 800 MCG: 200 TABLET ORAL at 14:57

## 2022-07-12 RX ADMIN — Medication 12 ML/HR: at 12:20

## 2022-07-12 RX ADMIN — Medication 10 ML: at 09:01

## 2022-07-12 RX ADMIN — LIDOCAINE HYDROCHLORIDE AND EPINEPHRINE 3 ML: 15; 5 INJECTION, SOLUTION EPIDURAL at 12:10

## 2022-07-12 RX ADMIN — Medication 324 MG: at 16:03

## 2022-07-12 RX ADMIN — PRENATAL VIT W/ FE FUMARATE-FA TAB 27-0.8 MG 1 TABLET: 27-0.8 TAB at 16:03

## 2022-07-12 RX ADMIN — DOCUSATE SODIUM 100 MG: 100 CAPSULE, LIQUID FILLED ORAL at 19:46

## 2022-07-12 RX ADMIN — SODIUM CHLORIDE, POTASSIUM CHLORIDE, SODIUM LACTATE AND CALCIUM CHLORIDE 999 ML/HR: 600; 310; 30; 20 INJECTION, SOLUTION INTRAVENOUS at 11:50

## 2022-07-12 RX ADMIN — SODIUM CHLORIDE, POTASSIUM CHLORIDE, SODIUM LACTATE AND CALCIUM CHLORIDE 125 ML/HR: 600; 310; 30; 20 INJECTION, SOLUTION INTRAVENOUS at 05:44

## 2022-07-12 RX ADMIN — OXYTOCIN-SODIUM CHLORIDE 0.9% IV SOLN 30 UNIT/500ML 650 ML/HR: 30-0.9/5 SOLUTION at 14:49

## 2022-07-12 RX ADMIN — OXYTOCIN-SODIUM CHLORIDE 0.9% IV SOLN 30 UNIT/500ML 85 ML/HR: 30-0.9/5 SOLUTION at 16:03

## 2022-07-12 RX ADMIN — OXYTOCIN-SODIUM CHLORIDE 0.9% IV SOLN 30 UNIT/500ML 2 MILLI-UNITS/MIN: 30-0.9/5 SOLUTION at 06:39

## 2022-07-12 NOTE — PROGRESS NOTES
Epidural was difficult to place but pt is comfortable now. AROM with copious clear fluid. Cx 70%, 6 cm , -2 station. FSR.   Kiki Rubio MD

## 2022-07-12 NOTE — H&P
Ms. Wells is a alejandra 26 yo  with an IUP @ 39.0 weeks here for IOL for GDMA1. Her blood sugars here have been 98-99. Her prenatal care has been regular and complicated by GDMA1 wthi good control, anemia with an admission Hgb of 11.4, GERD on Pepcid, mild asthma and maternal obesity. She is GBS negative. Her cervix is 70% effaced, 5 cm and - 2 station. She is on 10 miu of pitocin and sam q 2-3 minutes. She is planning and epidural soon. FHRs 140s and reactive. Plan of care d/w pt.   Kiki Rubio MD

## 2022-07-12 NOTE — NURSING NOTE
Unable to determine QBL at delivery d/t blue towels with stools in collection bag. MD estimated EBL of 200 mls.

## 2022-07-12 NOTE — PLAN OF CARE
Goal Outcome Evaluation:  Plan of Care Reviewed With: patient, significant other, mother        Progress: improving  Outcome Evaluation: Vitals wnl. FFu/1, scant rubra lochia.Ibuprofen po for pain controlled. Pt breastfeeding on demand. Bonding well. Transfer to postpartum once pt is stable post epidural. Cont with plan of care. Update MD as needed with changes.

## 2022-07-12 NOTE — NURSING NOTE
Pt assisted up to the bathroom, gait steady. Pt voided x 1 unmeasured. Pt shown pericare. Gown changed. Pt transferred to 1121 via wheelchair escorted by RN and significant other. Pt and significant other oriented to room, call light, and unit routine. Pt and significant other verbalized understanding of orientation.

## 2022-07-12 NOTE — L&D DELIVERY NOTE
KATHIA Eng  Vaginal Delivery Note   Review the Delivery Report for details.       Delivery     Delivery: Vaginal, Spontaneous     YOB: 2020    Time of Birth:  Gestational Age 2:41 PM   39w0d     Anesthesia: Epidural     Delivering clinician: Kiki Rubio    Forceps?   No   Vacuum? No    Shoulder dystocia present: No        Delivery narrative:    Ms. Wells is a alejandra 28 yo  with an IUP @ 39.0 weeks here for IOL for GDMA1. Her blood sugars here have been 98-99. Her prenatal care has been regular and complicated by GDMA1 wthi good control, anemia with an admission Hgb of 11.4, GERD on Pepcid, mild asthma and maternal obesity. She is GBS negative. Her cervix was 70% effaced, 5 cm and - 2 station and she was on pitocin.  Her blood sugars remain normal during labor her epidural was somewhat difficult to place, however, she ended up with a good pain control.  Amniotomy was performed with return of clear fluid.  The patient progressed to complete complete and +1.  She pushed several times deliver a live viable female infant over an intact perineum.  Mouth and nose were bulb suction on the perineum and the shoulders were easily delivered despite the baby's large size.  The infant handed waiting mom to initiate Kangaroo care.  Once the cord stopped pulsating, was doubly divided and cut.  Cord blood was collected.  There was no to be vigorous and moving all 4 extremities with a good cry.  Apgars were 9 and 10.  The placenta delivered spontaneously and intact was noted to have a three-vessel cord.  Inspection of the cervix, perineum and sidewalls revealed no lacerations.  Patient tolerated the procedure well.  All counts were correct for the procedure.  Fundus was firm and lochia was scant.  Both mom and infant were left to recover together in labor and delivery prior to transfer the postpartum unit..     Infant    Findings: female  infant     Infant observations: Weight: No birth weight  on file.   Length:   in  Observations/Comments:        Apgars:  9 @ 1 minute /      10@ 5 minutes   Infant Name: Ryah     Placenta, Cord, and Fluid    Placenta delivered  Spontaneous  at   7/12/2022  2:49 PM     Cord: 3 vessels  present.   Nuchal Cord?  no   Cord blood obtained: Yes    Cord gases obtained:  No    Cord gas results: Venous:  No results found for: PHCVEN    Arterial:  No results found for: PHCART     Repair    Episiotomy: None     No    Lacerations: No   Estimated Blood Loss:  200cc             Quantitative Blood Loss:                  Complications  none    Disposition  Mother to Remain in LD  in stable condition currently.  Baby to remains with mom  in stable condition currently.      Kiki Rubio MD  07/12/22  15:31 EDT

## 2022-07-12 NOTE — ANESTHESIA POSTPROCEDURE EVALUATION
Patient: Kylee Wells    Procedure Summary     Date: 07/12/22 Room / Location:     Anesthesia Start: 1053 Anesthesia Stop: 1441    Procedure: LABOR ANALGESIA Diagnosis:     Scheduled Providers:  Provider: Valente Mclean CRNA    Anesthesia Type: epidural ASA Status: 2          Anesthesia Type: epidural    Vitals  Vitals Value Taken Time   /59 07/12/22 1444   Temp 98 °F (36.7 °C) 07/12/22 1242   Pulse 82 07/12/22 1444   Resp 20 07/12/22 1242   SpO2 100 % 07/12/22 1415   Vitals shown include unvalidated device data.        Post Anesthesia Care and Evaluation    Patient location during evaluation: bedside  Patient participation: complete - patient participated  Level of consciousness: awake and alert  Pain score: 0  Pain management: adequate    Airway patency: patent  Anesthetic complications: No anesthetic complications  PONV Status: none  Cardiovascular status: acceptable  Respiratory status: acceptable  Hydration status: acceptable  Post Neuraxial Block status: No signs or symptoms of PDPH

## 2022-07-12 NOTE — ANESTHESIA PROCEDURE NOTES
Labor Epidural    Pre-sedation assessment completed: 7/12/2022 10:59 AM    Patient reassessed immediately prior to procedure    Patient location during procedure: OB  Start Time: 7/12/2022 12:02 PM  Stop Time: 7/12/2022 12:10 PM  Performed By  CRNA/CAA: Ian Pimentel CRNA  Preanesthetic Checklist  Completed: patient identified, IV checked, site marked, risks and benefits discussed, surgical consent, monitors and equipment checked, pre-op evaluation and timeout performed  Additional Notes  1st attempt by JV/NM  2nd attempt by TE  3rd attempt by CH  Prep:  Pt Position:sitting  Sterile Tech:cap, gloves, mask and sterile barrier  Prep:chlorhexidine gluconate and isopropyl alcohol  Monitoring:blood pressure monitoring, EKG and continuous pulse oximetry  Epidural Block Procedure:  Approach:midline  Guidance:landmark technique and palpation technique  Location:L3-L4  Needle Type:Tuohy  Needle Gauge:17 G  Loss of Resistance Medium: saline  Loss of Resistance: 9cm  Cath Depth at skin:14 cm  Paresthesia: none  Aspiration:negative  Test Dose:negative  Number of Attempts: 3  Post Assessment:  Dressing:occlusive dressing applied and secured with tape  Pt Tolerance:patient tolerated the procedure well with no apparent complications  Complications:no

## 2022-07-13 ENCOUNTER — PATIENT OUTREACH (OUTPATIENT)
Dept: OBSTETRICS AND GYNECOLOGY | Facility: HOSPITAL | Age: 27
End: 2022-07-13

## 2022-07-13 LAB
GLUCOSE BLDC GLUCOMTR-MCNC: 99 MG/DL (ref 70–130)
HCT VFR BLD AUTO: 30 % (ref 34–46.6)
HGB BLD-MCNC: 9.8 G/DL (ref 12–15.9)

## 2022-07-13 PROCEDURE — 82962 GLUCOSE BLOOD TEST: CPT

## 2022-07-13 PROCEDURE — 0503F POSTPARTUM CARE VISIT: CPT | Performed by: NURSE PRACTITIONER

## 2022-07-13 PROCEDURE — 85018 HEMOGLOBIN: CPT | Performed by: OBSTETRICS & GYNECOLOGY

## 2022-07-13 PROCEDURE — 85014 HEMATOCRIT: CPT | Performed by: OBSTETRICS & GYNECOLOGY

## 2022-07-13 RX ADMIN — IBUPROFEN 800 MG: 800 TABLET, FILM COATED ORAL at 01:20

## 2022-07-13 RX ADMIN — DOCUSATE SODIUM 100 MG: 100 CAPSULE, LIQUID FILLED ORAL at 21:00

## 2022-07-13 RX ADMIN — IBUPROFEN 800 MG: 800 TABLET, FILM COATED ORAL at 09:04

## 2022-07-13 RX ADMIN — Medication 324 MG: at 17:08

## 2022-07-13 RX ADMIN — DOCUSATE SODIUM 100 MG: 100 CAPSULE, LIQUID FILLED ORAL at 09:04

## 2022-07-13 RX ADMIN — IBUPROFEN 800 MG: 800 TABLET, FILM COATED ORAL at 17:08

## 2022-07-13 RX ADMIN — FAMOTIDINE 40 MG: 20 TABLET ORAL at 09:04

## 2022-07-13 RX ADMIN — PRENATAL VIT W/ FE FUMARATE-FA TAB 27-0.8 MG 1 TABLET: 27-0.8 TAB at 09:04

## 2022-07-13 RX ADMIN — FAMOTIDINE 40 MG: 20 TABLET ORAL at 19:39

## 2022-07-13 RX ADMIN — FAMOTIDINE 40 MG: 20 TABLET ORAL at 21:00

## 2022-07-13 RX ADMIN — HYDROCODONE BITARTRATE AND ACETAMINOPHEN 1 TABLET: 5; 325 TABLET ORAL at 06:06

## 2022-07-13 RX ADMIN — Medication 324 MG: at 07:58

## 2022-07-13 RX ADMIN — DOCUSATE SODIUM 100 MG: 100 CAPSULE, LIQUID FILLED ORAL at 19:39

## 2022-07-13 RX ADMIN — HYDROCODONE BITARTRATE AND ACETAMINOPHEN 1 TABLET: 5; 325 TABLET ORAL at 16:07

## 2022-07-13 NOTE — OUTREACH NOTE
[facility]  IP Postpartum    Questions/Answers    Flowsheet Row Responses   Best Method for Contacting Cell   Support Person Present Yes   Does the patient have a car seat at the hospital Yes   Delivery Note Reviewed Reviewed   Were birth expectations met? Yes   Is there a need for additional support/resources? No   Lactation Note Reviewed Reviewed   Is additional support needed? Yes   Yes, additional support needed comment pt having some issues   Any questions or concerns? No   Is the patient going to use Meds to Beds? Yes   Any concerns related discharge meds/ability to  prescriptions? No   OB Discharge Navigator Reviewed  Reviewed   Confirm Postpartum OB appointment No  [pending d/c]   Confirm initial well-child Pediatrician appointment date/time: No  [pending d/c]   Additional post-discharge F/U appointments No   Does patient have transportation to appointments? No   Will she need a ? No   Any other assistance needed to ensure she is able to attend appointments? No   Does patient have supplies needed at home for  care? Breast Pump, Clothing, Crib, Diapers, Formula        F/u one wk 2022 pp check in    ARNOL López  Maternity Nurse Navigator    2022, 15:19 EDT

## 2022-07-13 NOTE — PROGRESS NOTES
KATHIA Eng  Vaginal Delivery Progress Note    Subjective   Subjective  Postpartum Day 1: Vaginal Delivery    The patient feels well.  Her pain is well controlled with nonsteroidal anti-inflammatory drugs.   She is ambulating well.  Patient describes her bleeding as thin lochia.    Breastfeeding: declines.    Objective     Objective   Vital Signs Range for the last 24 hours  Temperature: Temp:  [98 °F (36.7 °C)-98.4 °F (36.9 °C)] 98.2 °F (36.8 °C)   Temp Source: Temp src: Oral   BP: BP: (108-139)/(55-80) 113/68   Pulse: Heart Rate:  [] 92   Respirations: Resp:  [16-20] 16   SPO2: SpO2:  [92 %-100 %] 97 %   O2 Amount (l/min):     O2 Devices Device (Oxygen Therapy): room air   Weight:       Admit Height:       Physical Exam:  General:  no acute distresss.  Abdomen: Fundus: appropriate, firm, non tender  Extremities: normal, atraumatic, no cyanosis, and trace edema.       Lab results reviewed:  Yes   Rubella:  No results found for: RUBELLAIGGIN Nurse Transcribed from prenatal record --    Rubella Antibodies, IgG   Date Value Ref Range Status   12/08/2021 1.32 Immune >0.99 index Final     Comment:                                     Non-immune       <0.90                                  Equivocal  0.90 - 0.99                                  Immune           >0.99     Rh Status:    RH type   Date Value Ref Range Status   07/12/2022 Positive  Final     Immunizations:   Immunization History   Administered Date(s) Administered   • COVID-19 (MARLENY) 06/17/2021   • DTaP, Unspecified 1997, 1997, 1997, 08/26/1998   • HPV Quadrivalent 07/24/2009   • Hep B, Adolescent or Pediatric 1997   • HiB 1997, 1997, 08/26/1998   • IPV 1997, 1997   • Influenza LAIV (Nasal) 10/17/2008   • Influenza TIV (IM) 12/06/2007, 09/15/2009, 11/29/2010   • MMR 08/26/1998   • Meningococcal MCV4P (Menactra) 07/24/2009   • Tdap 03/31/2008, 01/19/2018, 04/28/2022   • Varicella 08/26/1998        Assessment & Plan   Assessment & Plan    * No active hospital problems. *      Kylee Wells is Day 1  post-partum  Vaginal, Spontaneous   .      Plan:  1) Postpartum day #1- S/P . Rh +. Hgb 9.8g/dL.     2) Postpartum care- advance.     3) Female infant- Bottle     4) Postpartum anemia- Continue ferrous gluconate.     5) GDMA1- Needs 2hr GTT @ 6 weeks postpartum.    6) Dispo- Plan home tomorrow.       Jinny Meade, APRN  2022  08:28 EDT

## 2022-07-13 NOTE — PLAN OF CARE
Goal Outcome Evaluation:  Plan of Care Reviewed With: patient        Progress: improving  Outcome Evaluation: Vitals wnl. FFu/1, scant rubra lochia. Pt voiding without difficulty. Pt pain controlled with po Ibuprofen and norco. Breastfeeding on demand q2-4hrs for 15-20 mins per feeding. Bonding well. Cont with plan of care. Update MD as needed with changes.

## 2022-07-13 NOTE — PLAN OF CARE
Goal Outcome Evaluation:  Plan of Care Reviewed With: patient, significant other        Progress: improving  Outcome Evaluation: VSS, fundus firm, bleeding scant, PT up and walking around, PT voiding frequently and without difficulty, PT feeding infant q2-3 hours, PT stated she was concerned about the infant feeding because her previous infant had to have special formula due to difficulty processing proteins, discussed with the PT that she can breastfeeding this infant and if difficulties arrise with feeding and tolerence she may discuss that with the infant pediatrician.

## 2022-07-14 VITALS
RESPIRATION RATE: 18 BRPM | WEIGHT: 273 LBS | BODY MASS INDEX: 44.06 KG/M2 | SYSTOLIC BLOOD PRESSURE: 129 MMHG | TEMPERATURE: 98.4 F | HEART RATE: 95 BPM | DIASTOLIC BLOOD PRESSURE: 78 MMHG | OXYGEN SATURATION: 97 %

## 2022-07-14 PROCEDURE — 0503F POSTPARTUM CARE VISIT: CPT | Performed by: NURSE PRACTITIONER

## 2022-07-14 RX ORDER — IBUPROFEN 800 MG/1
800 TABLET ORAL EVERY 8 HOURS PRN
Qty: 30 TABLET | Refills: 0 | Status: SHIPPED | OUTPATIENT
Start: 2022-07-14 | End: 2022-09-01

## 2022-07-14 RX ORDER — HYDROCODONE BITARTRATE AND ACETAMINOPHEN 5; 325 MG/1; MG/1
1 TABLET ORAL EVERY 4 HOURS PRN
Qty: 10 TABLET | Refills: 0 | Status: SHIPPED | OUTPATIENT
Start: 2022-07-14 | End: 2022-07-19

## 2022-07-14 RX ORDER — PSEUDOEPHEDRINE HCL 30 MG
100 TABLET ORAL 2 TIMES DAILY
Qty: 60 CAPSULE | Refills: 0 | Status: SHIPPED | OUTPATIENT
Start: 2022-07-14

## 2022-07-14 RX ADMIN — IBUPROFEN 800 MG: 800 TABLET, FILM COATED ORAL at 01:12

## 2022-07-14 RX ADMIN — Medication 324 MG: at 07:42

## 2022-07-14 RX ADMIN — PRENATAL VIT W/ FE FUMARATE-FA TAB 27-0.8 MG 1 TABLET: 27-0.8 TAB at 09:10

## 2022-07-14 RX ADMIN — IBUPROFEN 800 MG: 800 TABLET, FILM COATED ORAL at 09:10

## 2022-07-14 RX ADMIN — DOCUSATE SODIUM 100 MG: 100 CAPSULE, LIQUID FILLED ORAL at 09:09

## 2022-07-14 RX ADMIN — FAMOTIDINE 40 MG: 20 TABLET ORAL at 09:10

## 2022-07-14 NOTE — PLAN OF CARE
Goal Outcome Evaluation:  Plan of Care Reviewed With: patient     Progress: improving  Outcome Evaluation: discharge to home

## 2022-07-14 NOTE — DISCHARGE SUMMARY
Obstetrical Discharge Form    Primary OB Clinician: SYMONE    Gestational Age: 39.0     Antepartum complications: anemia and GERD, GDMA1, Increased BMI    Date of Delivery:  2022     Delivered By: Kiki Rubio     Delivery Type: spontaneous vaginal delivery    Tubal Ligation: n/a    Baby: Liveborn female, Apgars 9/10, weight 8 #, 1.1 oz,     Anesthesia: epidural    Intrapartum complications: None    Laceration: none    Feeding method: breast and bottle      Discharge Date: 2022; Discharge Time: 09:56 EDT    /78 (BP Location: Left arm, Patient Position: Lying)   Pulse 95   Temp 98.4 °F (36.9 °C) (Oral)   Resp 18   Wt 124 kg (273 lb)   LMP 10/01/2021   SpO2 97%   Breastfeeding Yes   BMI 44.06 kg/m²      Abd: soft, fundus firm, non tender  Ext: trace edema, neg Anshul's sign, no cords  Results from last 7 days   Lab Units 22  0558   HEMOGLOBIN g/dL 9.8*       Impression: 1) Postpartum day #2- S/P . Rh +. Hgb 9.8g/dL.     2) Postpartum anemia- Continue iron.     3) Female infant- Breast and bottle feeding.     4) GDMA1- Needs 2hr GTT @ 6 weeks postpartum     5) Contraception- Desires pills     Plan:    Patient given written instruction sheet.  Follow-up appointment with [facility] in 6 weeks.    ARNOL Castellanos  09:56 EDT  2022

## 2022-07-14 NOTE — NURSING NOTE
Review d/c instructions with pt. Pt instructed to schedule a 6 weeks f/u appt with [facility] office as soon as possible. Pt verbalized understanding of d/c instructions.

## 2022-07-15 NOTE — CASE MANAGEMENT/SOCIAL WORK
Case Management Discharge Note      Final Note: dc home         Selected Continued Care - Discharged on 7/14/2022 Admission date: 7/12/2022 - Discharge disposition: Home or Self Care    Destination    No services have been selected for the patient.              Durable Medical Equipment    No services have been selected for the patient.              Dialysis/Infusion    No services have been selected for the patient.              Home Medical Care    No services have been selected for the patient.              Therapy    No services have been selected for the patient.              Community Resources    No services have been selected for the patient.              Community & DME    No services have been selected for the patient.                Selected Continued Care - Episodes Includes selections from active Coordinated Care Management episodes    [facility] Episode start date: 4/25/2022   There are no active outsourced providers for this episode.                    Final Discharge Disposition Code: 01 - home or self-care

## 2022-08-01 ENCOUNTER — HOSPITAL ENCOUNTER (OUTPATIENT)
Dept: LACTATION | Facility: HOSPITAL | Age: 27
Discharge: HOME | End: 2022-08-01

## 2022-08-01 NOTE — LACTATION NOTE
Baby  22  BW 8-1.1  Wt on 22 was 7-8  Today's weight 7-13.5  After BF 7-15    Mom here today to work on latching baby. She reports baby pinches with latch. Baby has slight tongue tie and lip tie. Assisted and educated mom on importance of deep latching. Baby is latching better per mom and she denies pain. Mom has larger breast on right. Baby transferred 1.4 oz on right breast in 12 min and 0.1 oz on left breast in 5 min. Mom reports she was getting 2 oz from right breast with pumping and now she isnt getting much. She reports baby is wanting to BF a lot. Educated on supply and demand. Encouraged mom to pump at least 2 times a day after BF and anytime baby isnt BF every 3 hours. Educated mom on maternal diet and hydration, taking PNV and baby should have vit. D drops. Encouraged mom to talk to ped about baby's tongue/lip issues if they become a concern. Baby has weight check in morning. Mom will f/u with ped or [facility] within one week to check on baby's weight. Mom reports baby has had some formula. Baby took one ounce after BF today.    Lactation Consult Note    Evaluation Completed: 2022 14:25 EDT  Patient Name: Kylee Wells  :  1995  MRN:  [id number]     REFERRAL  INFORMATION:                          Date of Referral: 22   Person Making Referral: patient            MATERNAL ASSESSMENT:  Breast Size Issue: yes, right (right larger) (22 1300)  Breast Shape: lack of glandular tissue (in left breast) (22 1300)  Breast Density: soft (22 1300)  Areola: elastic (22 1300)  Nipples: everted (22 1300)                MATERNAL INFANT FEEDING:     Maternal Emotional State: relaxed (22 1300)  Infant Positioning: cross-cradle (22 1300)   Signs of Milk Transfer: deep jaw excursions noted, audible swallow (22 1300)  Pain with Feeding: no (22 1300)              Comfort Measures Following Feeding: air-drying encouraged (22 1300)         Latch Assistance: minimal assistance (22)                           Feeding Readiness Cues: eager, rooting (22)        Effective Latch During Feeding: yes (22)        Prefeeding Weight (gm): 3544 g (125 oz) (22)  Postfeeding Weight (gm): 3615 g (127.5 oz) (22)  Weight Gain/Loss (gm) : 71 g (2.5 oz) (22)      Latch: 2-->grasps breast, tongue down, lips flanged, rhythmic sucking (22)  Audible Swallowin-->spontaneous and intermittent (24 hrs old) (22)  Type of Nipple: 2-->everted (after stimulation) (22)  Comfort (Breast/Nipple): 2-->soft/nontender (22)  Hold (Positioning): 1-->minimal assist, teach one side, mother does other, staff holds (22)  Latch Score: 9 (22)      EQUIPMENT TYPE:  Breast Pump Type: double electric, personal (22)  Breast Pump Flange Type: hard (22)  Breast Pump Flange Size: 24 mm (22)                        BREAST PUMPING:  Breast Pumping Interventions: frequent pumping encouraged (22)       LACTATION REFERRALS:

## 2022-09-01 ENCOUNTER — POSTPARTUM VISIT (OUTPATIENT)
Dept: OBSTETRICS AND GYNECOLOGY | Facility: CLINIC | Age: 27
End: 2022-09-01

## 2022-09-01 VITALS
DIASTOLIC BLOOD PRESSURE: 88 MMHG | WEIGHT: 257 LBS | HEIGHT: 65 IN | BODY MASS INDEX: 42.82 KG/M2 | SYSTOLIC BLOOD PRESSURE: 124 MMHG

## 2022-09-01 DIAGNOSIS — Z11.51 ENCOUNTER FOR SCREENING FOR HUMAN PAPILLOMAVIRUS (HPV): ICD-10-CM

## 2022-09-01 DIAGNOSIS — R20.0 LEFT LEG NUMBNESS: ICD-10-CM

## 2022-09-01 DIAGNOSIS — Z01.419 PAP SMEAR, LOW-RISK: ICD-10-CM

## 2022-09-01 LAB
BILIRUB BLD-MCNC: NEGATIVE MG/DL
CLARITY, POC: CLEAR
COLOR UR: YELLOW
GLUCOSE UR STRIP-MCNC: NEGATIVE MG/DL
KETONES UR QL: NEGATIVE
LEUKOCYTE EST, POC: NEGATIVE
NITRITE UR-MCNC: NEGATIVE MG/ML
PH UR: 8 [PH] (ref 5–8)
PROT UR STRIP-MCNC: ABNORMAL MG/DL
RBC # UR STRIP: ABNORMAL /UL
SP GR UR: 1 (ref 1–1.03)
UROBILINOGEN UR QL: NORMAL

## 2022-09-01 PROCEDURE — 0503F POSTPARTUM CARE VISIT: CPT | Performed by: NURSE PRACTITIONER

## 2022-09-01 RX ORDER — ACETAMINOPHEN AND CODEINE PHOSPHATE 120; 12 MG/5ML; MG/5ML
1 SOLUTION ORAL DAILY
Qty: 84 TABLET | Refills: 3 | Status: SHIPPED | OUTPATIENT
Start: 2022-09-01 | End: 2023-02-14

## 2022-09-01 RX ORDER — LANSOPRAZOLE 30 MG/1
CAPSULE, DELAYED RELEASE ORAL
COMMUNITY
Start: 2022-07-23

## 2022-09-01 NOTE — PROGRESS NOTES
"Chief Complaint   Patient presents with   • Postpartum Care     Left Leg feels numb and feels like its going to give out.         HPI      Date of delivery: 7/14/22. S/P vaginal delivery.     Baby: female     The patient feels well. Patient describes her bleeding as staining only.     Breastfeeding: infant latching without difficulty without pain.   Adequate supply: yes  Problems with breast feeding: has trouble pumping     Bowel and Bladder normal function: urinating well, c/o pain with pooping (see below)   Taking prenatal vitamins:  yes  Adequate sleep: yes   Sexual activity: no     Post-partum depression:  no     She c/o numbness and weakness in her (L) leg. She felt this way some after delivery but she thought it was getting better. Reports sometimes her leg feels fine but  sometimes \"it will get really heavy and weak, it feels like it is just hanging there.\" She has not talked to a PCP or seen anyone for her c/o.      She c/o painful defecation. She notices some bleeding with her BMs.  She feels like her bowel movements are soft. This does not occur every time.     Review of Systems   Constitutional: Positive for fatigue.   Respiratory: Negative.    Cardiovascular: Negative.    Gastrointestinal: Negative.    Genitourinary: Negative.    Musculoskeletal: Negative.         Numbness and weakness in (L) leg   Skin: Negative.    Neurological: Negative.    Psychiatric/Behavioral: Positive for sleep disturbance.       The following portions of the patient's history were reviewed and updated as appropriate: allergies, current medications and problem list.             /88   Ht 165.1 cm (65\")   Wt 117 kg (257 lb)   LMP 10/01/2021   Breastfeeding No   BMI 42.77 kg/m²       Physical Exam  Vitals reviewed.   Constitutional:       Appearance: She is well-developed.   Neck:      Thyroid: No thyromegaly.   Cardiovascular:      Rate and Rhythm: Normal rate and regular rhythm.   Pulmonary:      Effort: Pulmonary " effort is normal.      Breath sounds: Normal breath sounds.   Chest:   Breasts:      Right: No inverted nipple, mass, nipple discharge, skin change or tenderness.      Left: No inverted nipple, mass, nipple discharge, skin change or tenderness.       Abdominal:      General: Bowel sounds are normal.      Palpations: Abdomen is soft.   Genitourinary:     Exam position: Supine.      Labia:         Right: No rash, tenderness, lesion or injury.         Left: No rash, tenderness, lesion or injury.       Vagina: No signs of injury and foreign body. No vaginal discharge, erythema, tenderness or bleeding.      Cervix: No cervical motion tenderness, discharge or friability.      Uterus: Not deviated, not enlarged, not fixed and not tender.       Adnexa:         Right: No mass, tenderness or fullness.          Left: No mass, tenderness or fullness.        Rectum: Normal.   Musculoskeletal:         General: Normal range of motion.      Cervical back: Normal range of motion and neck supple.   Skin:     General: Skin is warm and dry.   Neurological:      General: No focal deficit present.      Mental Status: She is alert and oriented to person, place, and time.   Psychiatric:         Mood and Affect: Mood normal.         Behavior: Behavior normal.             1. 6 weeks: Progressing well.   2. Postpartum care: No restrictions. Pt desires 4 more weeks of maternity leave.   3. Pap: 12/21  4. Contraception: Progestin only pills  5. Numbness and weakness of (L) leg: Rec chiropractor for adjustement   6. GDM- Needs 2hr GTT     RTO PRN     ARNOL Castellanos  9/1/2022  13:43 EDT

## 2022-09-08 ENCOUNTER — PATIENT OUTREACH (OUTPATIENT)
Dept: LABOR AND DELIVERY | Facility: HOSPITAL | Age: 27
End: 2022-09-08

## 2022-09-08 LAB
C TRACH RRNA CVX QL NAA+PROBE: NEGATIVE
CONV .: NORMAL
CYTOLOGIST CVX/VAG CYTO: NORMAL
CYTOLOGY CVX/VAG DOC CYTO: NORMAL
CYTOLOGY CVX/VAG DOC THIN PREP: NORMAL
DX ICD CODE: NORMAL
HIV 1 & 2 AB SER-IMP: NORMAL
N GONORRHOEA RRNA CVX QL NAA+PROBE: NEGATIVE
OTHER STN SPEC: NORMAL
STAT OF ADQ CVX/VAG CYTO-IMP: NORMAL
T VAGINALIS RRNA SPEC QL NAA+PROBE: NEGATIVE

## 2022-09-08 NOTE — OUTREACH NOTE
[facility]  Unable to Reach       Questions/Answers    Flowsheet Row Responses   Pending Outreach Postpartum Check-in   Call Attempt First   Outcome Left message   Next Call Attempt Date 09/09/22            Chasity Lorenzo, RN  Maternity Nurse Navigator    9/8/2022, 13:30 EDT

## 2022-09-09 ENCOUNTER — PATIENT OUTREACH (OUTPATIENT)
Dept: LABOR AND DELIVERY | Facility: HOSPITAL | Age: 27
End: 2022-09-09

## 2022-09-09 NOTE — OUTREACH NOTE
[facility]  Unable to Reach       Questions/Answers    Flowsheet Row Responses   Pending Outreach Postpartum Check-in   Call Attempt Second   Outcome Left message, Poolamihart message sent to patient            Chasity Lorenzo RN  Maternity Nurse Navigator    9/9/2022, 09:12 EDT

## 2022-09-12 PROBLEM — R20.0 LEFT LEG NUMBNESS: Status: ACTIVE | Noted: 2022-09-12

## 2022-09-16 ENCOUNTER — PATIENT OUTREACH (OUTPATIENT)
Dept: CALL CENTER | Facility: HOSPITAL | Age: 27
End: 2022-09-16

## 2022-09-16 NOTE — OUTREACH NOTE
[facility] Survey    Flowsheet Row Responses   [facility] facility patient discharged from? [facility]   Week 1 attempt successful? No   Call start time 0902   Unsuccessful attempts Attempt 1   Reschedule Today   Call end time 0903            CHANTELL HIGGINS - Registered Nurse

## 2022-09-16 NOTE — OUTREACH NOTE
Motherhood Connection Survey    Flowsheet Row Responses   [facility] facility patient discharged from? [facility]   Week 1 attempt successful? No   Unsuccessful attempts Attempt 2   Reschedule Tomorrow            CHANTELL HIGGINS - Registered Nurse

## 2022-09-19 ENCOUNTER — PATIENT OUTREACH (OUTPATIENT)
Dept: CALL CENTER | Facility: HOSPITAL | Age: 27
End: 2022-09-19

## 2022-09-19 NOTE — OUTREACH NOTE
Motherhood Connection Survey    Flowsheet Row Responses   [facility] facility patient discharged from? [facility]   Week 1 attempt successful? No   Unsuccessful attempts Attempt 3  [UTR both contact numbers]            VIANEY MARINELLI - Registered Nurse

## 2022-11-24 ENCOUNTER — HOSPITAL ENCOUNTER (EMERGENCY)
Facility: HOSPITAL | Age: 27
Discharge: HOME | End: 2022-11-24
Attending: EMERGENCY MEDICINE | Admitting: EMERGENCY MEDICINE

## 2022-11-24 VITALS
DIASTOLIC BLOOD PRESSURE: 73 MMHG | BODY MASS INDEX: 39.37 KG/M2 | HEART RATE: 102 BPM | RESPIRATION RATE: 18 BRPM | OXYGEN SATURATION: 99 % | HEIGHT: 66 IN | WEIGHT: 245 LBS | SYSTOLIC BLOOD PRESSURE: 132 MMHG | TEMPERATURE: 98.6 F

## 2022-11-24 DIAGNOSIS — J10.1 INFLUENZA A: Primary | ICD-10-CM

## 2022-11-24 LAB
FLUAV RNA RESP QL NAA+PROBE: DETECTED
FLUBV RNA RESP QL NAA+PROBE: NOT DETECTED
SARS-COV-2 RNA RESP QL NAA+PROBE: NOT DETECTED

## 2022-11-24 PROCEDURE — 87636 SARSCOV2 & INF A&B AMP PRB: CPT | Performed by: EMERGENCY MEDICINE

## 2022-11-24 PROCEDURE — 99283 EMERGENCY DEPT VISIT LOW MDM: CPT

## 2022-11-24 PROCEDURE — C9803 HOPD COVID-19 SPEC COLLECT: HCPCS

## 2022-11-24 RX ORDER — IBUPROFEN 600 MG/1
600 TABLET ORAL EVERY 6 HOURS PRN
Qty: 20 TABLET | Refills: 0 | Status: SHIPPED | OUTPATIENT
Start: 2022-11-24

## 2022-11-24 RX ORDER — BENZONATATE 200 MG/1
200 CAPSULE ORAL 3 TIMES DAILY PRN
Qty: 20 CAPSULE | Refills: 0 | Status: SHIPPED | OUTPATIENT
Start: 2022-11-24

## 2022-11-24 NOTE — ED PROVIDER NOTES
Subjective     History provided by:  Patient    History of Present Illness    · Chief complaint: Cough and congestion    · Location: Upper respiratory tract    · Quality/Severity: The patient has had a nonproductive cough, runny nose, congestion and a sore throat, body aches and fatigue.  She has had a fever to 101.    · Timing/Onset: Symptoms started yesterday morning.    · Modifying Factors: No aggravating or relieving factors.    · Associated symptoms: Denies shortness of breath.    · Narrative: The patient is a 27-year-old white female with upper respiratory tract symptoms mentioned above since yesterday morning.  Her past medical history is negative.  Her last menstrual period was 2 weeks ago and she denies possibly pregnancy.  She is a non-smoker.  She has been vaccinated against COVID but not flu.    Review of Systems   Constitutional: Positive for chills, fatigue and fever. Negative for activity change, appetite change and diaphoresis.   HENT: Positive for congestion, postnasal drip, rhinorrhea, sneezing and sore throat. Negative for dental problem, ear pain, hearing loss, mouth sores, sinus pressure and voice change.    Eyes: Negative for photophobia, pain, discharge, redness and visual disturbance.   Respiratory: Positive for cough. Negative for chest tightness, shortness of breath, wheezing and stridor.    Cardiovascular: Negative for chest pain, palpitations and leg swelling.   Gastrointestinal: Negative for abdominal pain, diarrhea, nausea and vomiting.   Genitourinary: Negative for difficulty urinating, dysuria, flank pain, frequency, hematuria and urgency.   Musculoskeletal: Positive for myalgias. Negative for arthralgias, back pain, gait problem, joint swelling, neck pain and neck stiffness.   Skin: Negative for color change and rash.   Neurological: Negative for dizziness, tremors, seizures, syncope, facial asymmetry, speech difficulty, weakness, light-headedness, numbness and headaches.  "  Hematological: Negative for adenopathy.   Psychiatric/Behavioral: Negative.  Negative for confusion and decreased concentration. The patient is not nervous/anxious.      Past Medical History:   Diagnosis Date   • Anemia    • Anxiety    • Asthma    • Endometriosis     Uncomfirmed   • Gestational diabetes    • Migraine    • Rapid heart beat      /73   Pulse 102   Temp 98.6 °F (37 °C) (Oral)   Resp 18   Ht 167.6 cm (66\")   Wt 111 kg (245 lb)   LMP 11/10/2022   SpO2 99%   BMI 39.54 kg/m²     Past Medical History:   Diagnosis Date   • Anemia    • Anxiety    • Asthma    • Endometriosis     Uncomfirmed   • Gestational diabetes    • Migraine    • Rapid heart beat        Allergies   Allergen Reactions   • Latex Hives       Past Surgical History:   Procedure Laterality Date   • CHOLECYSTECTOMY     • TONSILLECTOMY         Family History   Problem Relation Age of Onset   • Breast cancer Maternal Grandmother    • Colon cancer Maternal Grandmother    • Breast cancer Maternal Grandfather    • Lung cancer Maternal Grandfather        Social History     Socioeconomic History   • Marital status: Single   • Number of children: 1   Tobacco Use   • Smoking status: Never   • Smokeless tobacco: Never   Vaping Use   • Vaping Use: Never used   Substance and Sexual Activity   • Alcohol use: Never   • Drug use: Never   • Sexual activity: Yes     Partners: Male           Objective   Physical Exam  Vitals and nursing note reviewed.   Constitutional:       General: She is not in acute distress.     Appearance: She is well-developed. She is obese. She is not toxic-appearing or diaphoretic.      Comments: Patient does not appear ill.  She does have a frequent nonproductive cough in the ER.  Review of her vital signs: She is afebrile with a temperature of 98.6, respirations normal 18 with a normal room air oxygen saturation of 99%, slightly tachycardic with a heart rate of 102, blood pressure normal 132/73.   HENT:      Head: " Normocephalic and atraumatic.      Nose: Nose normal.      Mouth/Throat:      Mouth: Mucous membranes are moist.      Pharynx: Oropharynx is clear.   Eyes:      General: No scleral icterus.        Right eye: No discharge.         Left eye: No discharge.      Conjunctiva/sclera: Conjunctivae normal.      Pupils: Pupils are equal, round, and reactive to light.   Neck:      Thyroid: No thyromegaly.      Vascular: No JVD.   Cardiovascular:      Rate and Rhythm: Normal rate and regular rhythm.      Heart sounds: Normal heart sounds. No murmur heard.  Pulmonary:      Effort: Pulmonary effort is normal.      Breath sounds: Normal breath sounds. No wheezing, rhonchi or rales.   Chest:      Chest wall: No tenderness.   Abdominal:      General: Bowel sounds are normal. There is no distension.      Palpations: Abdomen is soft.      Tenderness: There is no abdominal tenderness.   Musculoskeletal:         General: No tenderness or deformity. Normal range of motion.      Cervical back: Normal range of motion and neck supple.   Lymphadenopathy:      Cervical: No cervical adenopathy.   Skin:     General: Skin is warm and dry.      Capillary Refill: Capillary refill takes less than 2 seconds.      Coloration: Skin is not pale.      Findings: No erythema or rash.   Neurological:      General: No focal deficit present.      Mental Status: She is alert and oriented to person, place, and time.      Cranial Nerves: No cranial nerve deficit.      Coordination: Coordination normal.      Comments: No focal motor sensory deficit   Psychiatric:         Mood and Affect: Mood normal.         Behavior: Behavior normal.         Thought Content: Thought content normal.         Judgment: Judgment normal.         Procedures           ED Course  ED Course as of 11/24/22 1302   Thu Nov 24, 2022   1248 The patient is influenza A PCR is positive.  Influenza PCR is negative.  COVID-19 PCR is negative. [TP]   1248 The patient will be prescribed Tessalon  and ibuprofen.  Tamiflu is currently on backorder.  She will be placed off work for the next few days. [TP]      ED Course User Index  [TP] Blu Zacarias MD                                           MDM  Number of Diagnoses or Management Options  Influenza A: new and requires workup     Amount and/or Complexity of Data Reviewed  Clinical lab tests: ordered and reviewed    Risk of Complications, Morbidity, and/or Mortality  Presenting problems: moderate  Diagnostic procedures: moderate  Management options: moderate  General comments: My differential diagnosis for cough includes but is not limited to:  Upper respiratory infection, bronchitis, pneumonia, COPD exacerbation, cough variant asthma, cardiac asthma, coronary artery disease, congestive heart failure, bacterial, viral or lung infections, lung cancer, aspiration pneumonitis, aspiration of foreign body and Covid -19        Patient Progress  Patient progress: stable      Final diagnoses:   Influenza A       ED Disposition  ED Disposition     ED Disposition   Discharge    Condition   Stable    Comment   --             Alena Cooper, APRN  [address] [address]  [phone]    Schedule an appointment as soon as possible for a visit in 1 week  If not improved         Medication List      New Prescriptions    benzonatate 200 MG capsule  Commonly known as: TESSALON  Take 1 capsule by mouth 3 (Three) Times a Day As Needed for Cough.     ibuprofen 600 MG tablet  Commonly known as: ADVIL,MOTRIN  Take 1 tablet by mouth Every 6 (Six) Hours As Needed for Moderate Pain or Fever for up to 20 doses.           Where to Get Your Medications      These medications were sent to [id number] - [place] [address] AT [facility] & [address] - [phone]  - [phone] [facility] [address] [place] [address]    Phone: [phone]   · benzonatate 200 MG capsule  · ibuprofen 600 MG tablet          Labs Reviewed   COVID-19 AND FLU A/B, NP SWAB IN TRANSPORT MEDIA 8-12 HR TAT - Abnormal; Notable for the following components:       Result Value    Influenza A PCR Detected (*)     All other components within normal limits    Narrative:     Fact sheet for providers: https://www.fda.gov/media/823224/download    Fact sheet for patients: https://www.fda.gov/media/253795/download    Test performed by PCR.     No orders to display          Medication List      New Prescriptions    benzonatate 200 MG capsule  Commonly known as: TESSALON  Take 1 capsule by mouth 3 (Three) Times a Day As Needed for Cough.     ibuprofen 600 MG tablet  Commonly known as: ADVIL,MOTRIN  Take 1 tablet by mouth Every 6 (Six) Hours As Needed for Moderate Pain or Fever for up to 20 doses.           Where to Get Your Medications      These medications were sent to [id number] - [place] [address] AT [facility] & [address] - [phone]  - [phone] [address] [facility] [address]    Phone: [phone]   · benzonatate 200 MG capsule  · ibuprofen 600 MG tablet              Blu Zacarias MD  11/24/22 3442

## 2022-12-08 ENCOUNTER — TELEMEDICINE (OUTPATIENT)
Dept: FAMILY MEDICINE CLINIC | Facility: TELEHEALTH | Age: 27
End: 2022-12-08

## 2022-12-08 VITALS — HEIGHT: 66 IN | BODY MASS INDEX: 39.37 KG/M2 | WEIGHT: 245 LBS

## 2022-12-08 DIAGNOSIS — R05.8 POST-VIRAL COUGH SYNDROME: Primary | ICD-10-CM

## 2022-12-08 PROCEDURE — 99213 OFFICE O/P EST LOW 20 MIN: CPT | Performed by: NURSE PRACTITIONER

## 2022-12-08 RX ORDER — METHYLPREDNISOLONE 4 MG/1
TABLET ORAL
Qty: 21 TABLET | Refills: 0 | Status: SHIPPED | OUTPATIENT
Start: 2022-12-08 | End: 2023-01-09

## 2022-12-08 RX ORDER — DEXTROMETHORPHAN HYDROBROMIDE AND PROMETHAZINE HYDROCHLORIDE 15; 6.25 MG/5ML; MG/5ML
5 SYRUP ORAL NIGHTLY PRN
Qty: 118 ML | Refills: 0 | OUTPATIENT
Start: 2022-12-08 | End: 2023-01-05

## 2022-12-08 RX ORDER — GUAIFENESIN 600 MG/1
600 TABLET, EXTENDED RELEASE ORAL 2 TIMES DAILY
Qty: 28 TABLET | Refills: 0 | Status: SHIPPED | OUTPATIENT
Start: 2022-12-08 | End: 2022-12-22

## 2022-12-09 NOTE — PROGRESS NOTES
You have chosen to receive care through a telehealth visit.  Do you consent to use a video/audio connection for your medical care today? Yes     CHIEF COMPLAINT  Cc: cough    HPI  Kylee Wells is a 27 y.o. female  presents with complaint of cough. She was diagnosed with the flu on 11/24/2022. The cough has worsened. She has shortness of breath and wheezing at times as well as chest tightness. Additional symptoms that the patient is having are noted in the ROS portion of this visit. The patient does have asthma so has an albuterol inhaler on hand which she has been using as needed. She also has tessalon perles on hand but reports that they are really not helping her much.    Review of Systems   Constitutional: Positive for fatigue. Negative for fever.   HENT: Positive for congestion, postnasal drip, sinus pressure (maxillary) and sinus pain (maxillary). Negative for rhinorrhea, sneezing and sore throat.    Respiratory: Positive for cough, chest tightness, shortness of breath (at times) and wheezing (at tightness).    Cardiovascular: Negative for chest pain.   Gastrointestinal: Negative for nausea and vomiting. Diarrhea: resolved.   Musculoskeletal: Negative for myalgias.   Neurological: Negative for headaches.       Past Medical History:   Diagnosis Date   • Anemia    • Anxiety    • Asthma    • Endometriosis     Uncomfirmed   • Gestational diabetes    • Migraine    • Rapid heart beat        Family History   Problem Relation Age of Onset   • Breast cancer Maternal Grandmother    • Colon cancer Maternal Grandmother    • Breast cancer Maternal Grandfather    • Lung cancer Maternal Grandfather        Social History     Socioeconomic History   • Marital status: Single   • Number of children: 1   Tobacco Use   • Smoking status: Never   • Smokeless tobacco: Never   Vaping Use   • Vaping Use: Never used   Substance and Sexual Activity   • Alcohol use: Never   • Drug use: Never   • Sexual activity: Yes     Partners:  "Male       Kylee Wells  reports that she has never smoked. She has never used smokeless tobacco..    Ht 167.6 cm (66\")   Wt 111 kg (245 lb)   LMP 11/10/2022   Breastfeeding No   BMI 39.54 kg/m²     PHYSICAL EXAM  Physical Exam   Constitutional: She is oriented to person, place, and time. She appears well-developed and well-nourished.   HENT:   Head: Normocephalic and atraumatic.   Right Ear: External ear normal.   Left Ear: External ear normal.   Nose: Rhinorrhea: tight cough noted at visit. Right sinus exhibits maxillary sinus tenderness. Left sinus exhibits maxillary sinus tenderness.   Eyes: Lids are normal. Right eye exhibits no discharge and no exudate. Left eye exhibits no discharge and no exudate. Right conjunctiva is not injected. Left conjunctiva is not injected.   Pulmonary/Chest: No accessory muscle usage. No tachypnea and no bradypnea.  No respiratory distress.No use of oxygen by nasal cannulaNo use of oxygen by mask noted.  Abdominal: Abdomen appears normal.   Neurological: She is alert and oriented to person, place, and time. No cranial nerve deficit.   Skin: Her skin appears normal.  Psychiatric: She has a normal mood and affect. Her speech is normal and behavior is normal. Judgment and thought content normal.       Results for orders placed or performed during the hospital encounter of 11/24/22   COVID-19 and FLU A/B PCR - Swab, Nasopharynx    Specimen: Nasopharynx; Swab   Result Value Ref Range    COVID19 Not Detected Not Detected - Ref. Range    Influenza A PCR Detected (A) Not Detected    Influenza B PCR Not Detected Not Detected       Diagnoses and all orders for this visit:    1. Post-viral cough syndrome (Primary)    Other orders  -     methylPREDNISolone (MEDROL) 4 MG dose pack; Take as directed on package instructions.  Dispense: 21 tablet; Refill: 0  -     guaiFENesin (Mucinex) 600 MG 12 hr tablet; Take 1 tablet by mouth 2 (Two) Times a Day for 14 days.  Dispense: 28 tablet; " Refill: 0  -     promethazine-dextromethorphan (PROMETHAZINE-DM) 6.25-15 MG/5ML syrup; Take 5 mL by mouth At Night As Needed for Cough.  Dispense: 118 mL; Refill: 0    Take medrol jenny with food as early in the day as possible  Do not take medrol with nsaids such as ibuprofen, aleve, or aspirin  May take tylenol for pain or fever  Mucinex with plenty of fluids especially water to thin secretions and help with congestion.  Do not drive after taking promethazine DM as it may make you drowsy  Albuterol inhaler you have on hand as needed for shortness of breath or wheezing    FOLLOW-UP  If symptoms worsen or persist follow up with PCP,Virtual Care or Urgent Care    Patient verbalizes understanding of medication dosage, comfort measures, instructions for treatment and follow-up.    Stacey Gutierrez, APRN  12/08/2022  19:16 EST    The use of a video visit has been reviewed with the patient and verbal informed consent has been obtained. Myself and Kylee Wells participated in this visit. The patient is located in [address] [place] [address].    I am located in [place], [place]. Mychart and Zoom were utilized. I spent 25 minutes in the patient's chart for this visit.

## 2022-12-09 NOTE — PATIENT INSTRUCTIONS
Take medrol jenny with food as early in the day as possible  Do not take medrol with nsaids such as ibuprofen, aleve, or aspirin  May take tylenol for pain or fever  Mucinex with plenty of fluids especially water to thin secretions and help with congestion.  Do not drive after taking promethazine DM as it may make you drowsy  Albuterol inhaler you have on hand as needed for shortness of breath or wheezing    FOLLOW-UP  If symptoms worsen or persist follow up with PCP,[facility] or Urgent Care

## 2023-01-09 ENCOUNTER — TELEMEDICINE (OUTPATIENT)
Dept: FAMILY MEDICINE CLINIC | Facility: TELEHEALTH | Age: 28
End: 2023-01-09
Payer: COMMERCIAL

## 2023-01-09 DIAGNOSIS — R42 VERTIGO: ICD-10-CM

## 2023-01-09 DIAGNOSIS — H69.82 DYSFUNCTION OF LEFT EUSTACHIAN TUBE: Primary | ICD-10-CM

## 2023-01-09 PROCEDURE — 99213 OFFICE O/P EST LOW 20 MIN: CPT | Performed by: NURSE PRACTITIONER

## 2023-01-09 RX ORDER — METHYLPREDNISOLONE 4 MG/1
TABLET ORAL
Qty: 21 TABLET | Refills: 0 | Status: SHIPPED | OUTPATIENT
Start: 2023-01-09

## 2023-01-09 RX ORDER — MECLIZINE HCL 12.5 MG/1
12.5 TABLET ORAL 3 TIMES DAILY PRN
Qty: 15 TABLET | Refills: 0 | Status: SHIPPED | OUTPATIENT
Start: 2023-01-09

## 2023-01-10 NOTE — PROGRESS NOTES
Subjective   Kylee Wells is a 27 y.o. female.     History of Present Illness  She woke up today and her balance is off, she is dizzy, nausea, and is \"leaning to the left.\" She was diagnosed with an URI on 01/05. Her left ear is hurting, the pain comes and goes and sometimes there is a ringing noise. The dizziness occurs when she lefts her head up. She has had this happen before and it was an ear infection. She has taken motion sickness medicine which helped with the nausea and dizziness. She has a history of infections that get really bad, although she only gets them once per year.        The following portions of the patient's history were reviewed and updated as appropriate: allergies, current medications, past family history, past medical history, past social history, past surgical history, and problem list.    Review of Systems   Constitutional: Negative for fever.   HENT: Positive for ear pain (feels warm to touch).    Respiratory: Positive for cough.    Gastrointestinal: Positive for nausea. Negative for diarrhea.       Objective   Physical Exam  Constitutional:       General: She is not in acute distress.     Appearance: She is well-developed. She is not diaphoretic.   Pulmonary:      Effort: Pulmonary effort is normal.   Neurological:      Mental Status: She is alert and oriented to person, place, and time.   Psychiatric:         Behavior: Behavior normal.           Assessment & Plan   Diagnoses and all orders for this visit:    1. Dysfunction of left eustachian tube (Primary)  -     methylPREDNISolone (MEDROL) 4 MG dose pack; Take as directed on package instructions.  Dispense: 21 tablet; Refill: 0    2. Vertigo  -     meclizine (ANTIVERT) 12.5 MG tablet; Take 1 tablet by mouth 3 (Three) Times a Day As Needed for Dizziness or Nausea.  Dispense: 15 tablet; Refill: 0          Fu in-person if symptoms worsen or do not improve in 2-3 days       The use of a video visit has been reviewed with the patient  and verbal informed consent has been obtained. Myself and Kylee Wells participated in this visit. The patient is located in [place], [place]. I am located in [place], [place]. [employer] and [employer] were utilized. I spent 20 minutes in the patient's chart for this visit.

## 2023-02-09 ENCOUNTER — TELEPHONE (OUTPATIENT)
Dept: OBSTETRICS AND GYNECOLOGY | Facility: CLINIC | Age: 28
End: 2023-02-09

## 2023-02-09 NOTE — TELEPHONE ENCOUNTER
Caller: Kylee Wells    Relationship to patient: Self    Best call back number: [phone]    Patient is needing PT IS REQUESTING TO SWITCH TO LOW ESTRL BIRTH CONTROL CONFIRMED PREFERRED PHARMACY [address] [facility], PT CONFIRMED SHE HAS BEEN BLEEDING FOR TWO WEEKS AS WELL.

## 2023-02-14 ENCOUNTER — TELEPHONE (OUTPATIENT)
Dept: OBSTETRICS AND GYNECOLOGY | Facility: CLINIC | Age: 28
End: 2023-02-14

## 2023-02-14 NOTE — TELEPHONE ENCOUNTER
Caller: Kylee Wells    Relationship: Self    Best call back number:[phone]-CALL ANYTIME, IT IS OKAY TO LV.    What medications are you currently taking:   Current Outpatient Medications on File Prior to Visit   Medication Sig Dispense Refill   • albuterol sulfate  (90 Base) MCG/ACT inhaler albuterol sulfate HFA 90 mcg/actuation aerosol inhaler     • albuterol sulfate  (90 Base) MCG/ACT inhaler Inhale 2 puffs Every 4 (Four) Hours As Needed for Wheezing. 18 g 3   • benzonatate (TESSALON) 200 MG capsule Take 1 capsule by mouth 3 (Three) Times a Day As Needed for Cough. 20 capsule 0   • docusate sodium 100 MG capsule Take 1 capsule by mouth 2 (Two) Times a Day. 60 capsule 0   • famotidine (Pepcid) 40 MG tablet Take 1 tablet by mouth 2 (Two) Times a Day. 60 tablet 6   • ibuprofen (ADVIL,MOTRIN) 600 MG tablet Take 1 tablet by mouth Every 6 (Six) Hours As Needed for Moderate Pain or Fever for up to 20 doses. 20 tablet 0   • lansoprazole (PREVACID) 30 MG capsule      • meclizine (ANTIVERT) 12.5 MG tablet Take 1 tablet by mouth 3 (Three) Times a Day As Needed for Dizziness or Nausea. 15 tablet 0   • methylPREDNISolone (MEDROL) 4 MG dose pack Take as directed on package instructions. 21 tablet 0   • norethindrone (MICRONOR) 0.35 MG tablet Take 1 tablet by mouth Daily. 84 tablet 3     No current facility-administered medications on file prior to visit.      norethindrone (MICRONOR) 0.35 MG tablet    When did you start taking these medications: 09.01.22    Which medication are you concerned about: norethindrone (MICRONOR) 0.35 MG tablet    Who prescribed you this medication: ARNOL GIBSON    What are your concerns: ABNORMAL BLEEDING. FOR TWO DAYS BLEEDING HAS BEEN OFF AND ON. PT STATED BEFORE THEN, BLEEDING WAS CONSTANT PERIOD LIKE FLOW.     How long have you had these concerns: SINCE STARTING MEDICATION.     PT IS WANTING A DIFFERENT TYPE OF BIRTH CONTROL SENT TO PHARMACY. PT SENT MESSAGE   REQUESTING TO CHANGE BIRTH CONTROL ON 02.09.23. PT IS REQUESTING A CALLBACK WITH MEDICATION UPDATE.      PT IS NO LONGER BREAST FEEDING.

## 2023-03-03 ENCOUNTER — HOSPITAL ENCOUNTER (EMERGENCY)
Facility: HOSPITAL | Age: 28
Discharge: HOME | End: 2023-03-03
Attending: EMERGENCY MEDICINE | Admitting: EMERGENCY MEDICINE
Payer: OTHER MISCELLANEOUS

## 2023-03-03 VITALS
SYSTOLIC BLOOD PRESSURE: 145 MMHG | OXYGEN SATURATION: 96 % | WEIGHT: 245 LBS | DIASTOLIC BLOOD PRESSURE: 81 MMHG | HEIGHT: 64 IN | TEMPERATURE: 98.1 F | RESPIRATION RATE: 16 BRPM | BODY MASS INDEX: 41.83 KG/M2 | HEART RATE: 91 BPM

## 2023-03-03 DIAGNOSIS — T30.0 BURN, FIRST DEGREE: Primary | ICD-10-CM

## 2023-03-03 PROCEDURE — 99283 EMERGENCY DEPT VISIT LOW MDM: CPT

## 2023-03-03 RX ORDER — IBUPROFEN 800 MG/1
800 TABLET ORAL EVERY 8 HOURS PRN
Qty: 15 TABLET | Refills: 0 | Status: SHIPPED | OUTPATIENT
Start: 2023-03-03

## 2023-03-03 RX ORDER — IBUPROFEN 200 MG
1 TABLET ORAL ONCE
Status: COMPLETED | OUTPATIENT
Start: 2023-03-03 | End: 2023-03-03

## 2023-03-03 RX ORDER — IBUPROFEN 400 MG/1
800 TABLET ORAL ONCE
Status: COMPLETED | OUTPATIENT
Start: 2023-03-03 | End: 2023-03-03

## 2023-03-03 RX ORDER — IBUPROFEN 200 MG
1 TABLET ORAL 3 TIMES DAILY
Qty: 3.5 G | Refills: 0 | Status: SHIPPED | OUTPATIENT
Start: 2023-03-03

## 2023-03-03 RX ADMIN — BACITRACIN, NEOMYCIN, POLYMYXIN B 1 APPLICATION: 400; 3.5; 5 OINTMENT TOPICAL at 22:20

## 2023-03-03 RX ADMIN — IBUPROFEN 800 MG: 400 TABLET, FILM COATED ORAL at 22:20

## 2023-03-04 NOTE — ED PROVIDER NOTES
Subjective   History of Present Illness  27-year-old female past medical history significant for anemia anxiety asthma endometriosis and migraine headaches presents the emergency room complaining of burning on her stomach.  Patient states that around 1600 today while at work she spilled hot water from a coffee machine onto her stomach causing a burn.  She states that since that time it does hurt but she is not taking any medications for it.  She presents the emergency room for this problem.  Patient states tetanus is up-to-date patient has no other complaints.        Review of Systems   Constitutional: Negative for activity change, appetite change, chills, diaphoresis, fatigue and fever.   HENT: Negative for congestion, rhinorrhea and sore throat.    Eyes: Negative for photophobia and visual disturbance.   Respiratory: Negative for cough and shortness of breath.    Cardiovascular: Negative for chest pain, palpitations and leg swelling.   Gastrointestinal: Negative for abdominal distention, abdominal pain, diarrhea, nausea and vomiting.   Genitourinary: Negative for dysuria and flank pain.   Musculoskeletal: Negative for arthralgias and back pain.   Skin: Negative for rash.        Burn   Neurological: Negative for dizziness, weakness and headaches.   Psychiatric/Behavioral: Negative for agitation and behavioral problems.       Past Medical History:   Diagnosis Date   • Anemia    • Anxiety    • Asthma    • Endometriosis     Uncomfirmed   • Gestational diabetes    • Migraine    • Rapid heart beat        Allergies   Allergen Reactions   • Latex Hives       Past Surgical History:   Procedure Laterality Date   • CHOLECYSTECTOMY     • TONSILLECTOMY         Family History   Problem Relation Age of Onset   • Breast cancer Maternal Grandmother    • Colon cancer Maternal Grandmother    • Breast cancer Maternal Grandfather    • Lung cancer Maternal Grandfather        Social History     Socioeconomic History   • Marital status:  Single   • Number of children: 1   Tobacco Use   • Smoking status: Never   • Smokeless tobacco: Never   Vaping Use   • Vaping Use: Never used   Substance and Sexual Activity   • Alcohol use: Never   • Drug use: Never   • Sexual activity: Yes     Partners: Male           Objective   Physical Exam  Vitals and nursing note reviewed.   Constitutional:       General: She is not in acute distress.     Appearance: Normal appearance. She is not ill-appearing, toxic-appearing or diaphoretic.   HENT:      Head: Normocephalic and atraumatic.      Nose: Nose normal.      Mouth/Throat:      Mouth: Mucous membranes are moist.   Eyes:      Conjunctiva/sclera: Conjunctivae normal.   Cardiovascular:      Rate and Rhythm: Normal rate.      Pulses: Normal pulses.   Pulmonary:      Effort: Pulmonary effort is normal.   Abdominal:      General: Abdomen is flat. There is no distension.      Tenderness: There is no abdominal tenderness.          Comments: Patient has some mild erythema without blistering on her stomach and area seen on diagram.  This is consistent with first-degree burn.  No other abnormality noted.   Musculoskeletal:         General: No swelling. Normal range of motion.      Cervical back: Normal range of motion and neck supple.   Skin:     General: Skin is warm and dry.   Neurological:      General: No focal deficit present.      Mental Status: She is alert and oriented to person, place, and time.   Psychiatric:         Mood and Affect: Mood normal.         Procedures           ED Course                                           Medical Decision Making  Burn differential includes but is not limited to first-degree, second-degree and third-degree burns.  Causes of burns and include flash burns, chemical burns, thermal burns, sunburns, tanning bed burns.        Final diagnoses:   Burn, first degree       ED Disposition  ED Disposition     ED Disposition   Discharge    Condition   Stable    Comment   --             Kenneth  Alena GUAMAN, APRN  [address]  [address]  [place] [address]  [phone]    Schedule an appointment as soon as possible for a visit       [facility] Emergency Department  [address]  [place] [address] [phone]  Go to   As needed         Medication List      New Prescriptions    neomycin-bacitracin-polymyxin 5-400-5000 ointment  Apply 1 application topically to the appropriate area as directed 3 (Three) Times a Day.        Changed    * ibuprofen 600 MG tablet  Commonly known as: ADVIL,MOTRIN  Take 1 tablet by mouth Every 6 (Six) Hours As Needed for Moderate Pain or Fever for up to 20 doses.  What changed: Another medication with the same name was added. Make sure you understand how and when to take each.     * ibuprofen 800 MG tablet  Commonly known as: ADVIL,MOTRIN  Take 1 tablet by mouth Every 8 (Eight) Hours As Needed for Mild Pain.  What changed: You were already taking a medication with the same name, and this prescription was added. Make sure you understand how and when to take each.         * This list has 2 medication(s) that are the same as other medications prescribed for you. Read the directions carefully, and ask your doctor or other care provider to review them with you.               Where to Get Your Medications      These medications were sent to [id number] - [place] [address] AT [address] - [phone] PH - [phone] [address] [facility] [address]    Phone: [phone]   · ibuprofen 800 MG tablet  · neomycin-bacitracin-polymyxin 5-400-5000 ointment          Surya Paredes MD  03/03/23 5300

## 2023-03-13 ENCOUNTER — LAB (OUTPATIENT)
Dept: LAB | Facility: HOSPITAL | Age: 28
End: 2023-03-13
Payer: COMMERCIAL

## 2023-03-13 ENCOUNTER — OFFICE VISIT (OUTPATIENT)
Dept: OBSTETRICS AND GYNECOLOGY | Facility: CLINIC | Age: 28
End: 2023-03-13
Payer: COMMERCIAL

## 2023-03-13 ENCOUNTER — HOSPITAL ENCOUNTER (OUTPATIENT)
Facility: HOSPITAL | Age: 28
Discharge: HOME | End: 2023-03-14
Attending: OBSTETRICS & GYNECOLOGY | Admitting: OBSTETRICS & GYNECOLOGY
Payer: COMMERCIAL

## 2023-03-13 VITALS — SYSTOLIC BLOOD PRESSURE: 128 MMHG | DIASTOLIC BLOOD PRESSURE: 84 MMHG | HEIGHT: 64 IN | BODY MASS INDEX: 42.05 KG/M2

## 2023-03-13 DIAGNOSIS — N94.89 ADNEXAL MASS: ICD-10-CM

## 2023-03-13 DIAGNOSIS — Z32.01 POSITIVE URINE PREGNANCY TEST: ICD-10-CM

## 2023-03-13 DIAGNOSIS — Z13.89 SCREENING FOR GENITOURINARY CONDITION: Primary | ICD-10-CM

## 2023-03-13 PROBLEM — Z34.90 PREGNANT: Status: ACTIVE | Noted: 2023-03-13

## 2023-03-13 LAB
B-HCG UR QL: POSITIVE
EXPIRATION DATE: ABNORMAL
HCG INTACT+B SERPL-ACNC: 26.2 MIU/ML
INTERNAL NEGATIVE CONTROL: ABNORMAL
INTERNAL POSITIVE CONTROL: ABNORMAL
Lab: ABNORMAL

## 2023-03-13 PROCEDURE — G0378 HOSPITAL OBSERVATION PER HR: HCPCS

## 2023-03-13 PROCEDURE — G0463 HOSPITAL OUTPT CLINIC VISIT: HCPCS

## 2023-03-13 PROCEDURE — 36415 COLL VENOUS BLD VENIPUNCTURE: CPT

## 2023-03-13 PROCEDURE — 84702 CHORIONIC GONADOTROPIN TEST: CPT

## 2023-03-13 RX ORDER — CETIRIZINE HYDROCHLORIDE 10 MG/1
TABLET ORAL EVERY 24 HOURS
COMMUNITY

## 2023-03-13 RX ORDER — SODIUM CHLORIDE 0.9 % (FLUSH) 0.9 %
10 SYRINGE (ML) INJECTION EVERY 12 HOURS SCHEDULED
Status: DISCONTINUED | OUTPATIENT
Start: 2023-03-13 | End: 2023-03-14 | Stop reason: HOSPADM

## 2023-03-13 RX ORDER — ACETAMINOPHEN AND CODEINE PHOSPHATE 120; 12 MG/5ML; MG/5ML
1 SOLUTION ORAL DAILY
COMMUNITY
Start: 2023-02-14

## 2023-03-13 RX ORDER — SODIUM CHLORIDE 9 MG/ML
40 INJECTION, SOLUTION INTRAVENOUS AS NEEDED
Status: DISCONTINUED | OUTPATIENT
Start: 2023-03-13 | End: 2023-03-14 | Stop reason: HOSPADM

## 2023-03-13 RX ORDER — SODIUM CHLORIDE 0.9 % (FLUSH) 0.9 %
10 SYRINGE (ML) INJECTION AS NEEDED
Status: DISCONTINUED | OUTPATIENT
Start: 2023-03-13 | End: 2023-03-14 | Stop reason: HOSPADM

## 2023-03-13 RX ADMIN — Medication 10 ML: at 21:00

## 2023-03-13 NOTE — PROGRESS NOTES
"Subjective     Chief Complaint   Patient presents with   • Menstrual Problem       Kylee Wells is a 27 y.o.  whose LMP is No LMP recorded (lmp unknown).. She presents with c/o irregular bleeding. She is S/P  8 months ago. She has been POPs because of breast feeding. She stopped breast feeding and switched to regular OCPS approx 1 month ago. The irreg bleeding started approx 7 weeks ago when she was on the progesterone only contraception. She denies pain. She is sexually active.     HPI    HPI    The following portions of the patient's history were reviewed and updated as appropriate:vital signs, allergies, current medications, past medical history, past social history, past surgical history and problem list      Review of Systems     Review of Systems   Constitutional: Positive for fatigue.   Genitourinary: Positive for vaginal bleeding.   Neurological: Positive for dizziness.       Objective      /84   Ht 162.6 cm (64\")   LMP  (LMP Unknown)   BMI 42.05 kg/m²     Physical Exam    Physical Exam  Vitals and nursing note reviewed.   Constitutional:       Appearance: Normal appearance.   Skin:     General: Skin is warm and dry.   Neurological:      General: No focal deficit present.      Mental Status: She is alert and oriented to person, place, and time.   Psychiatric:         Mood and Affect: Mood normal.         Behavior: Behavior normal.         Lab Review   Labs: Urine pregnancy test     Imaging   Ultrasound - Pelvic Vaginal    Assessment  There are no diagnoses linked to this encounter.    Additional Assessment:   1. + pregnancy test    Plan     1. + pregnancy test- Unplanned. Pt is emotional regarding the pregnancy. Check quant HCG and TVUS.       Jinny Meade, APRN  3/13/2023        "

## 2023-03-14 VITALS
WEIGHT: 249 LBS | DIASTOLIC BLOOD PRESSURE: 80 MMHG | HEART RATE: 75 BPM | BODY MASS INDEX: 42.51 KG/M2 | TEMPERATURE: 97.9 F | SYSTOLIC BLOOD PRESSURE: 130 MMHG | OXYGEN SATURATION: 98 % | HEIGHT: 64 IN | RESPIRATION RATE: 18 BRPM

## 2023-03-14 LAB
ABO GROUP BLD: NORMAL
BLD GP AB SCN SERPL QL: NEGATIVE
DEPRECATED RDW RBC AUTO: 45.5 FL (ref 37–54)
ERYTHROCYTE [DISTWIDTH] IN BLOOD BY AUTOMATED COUNT: 14.4 % (ref 12.3–15.4)
HCG INTACT+B SERPL-ACNC: 23.09 MIU/ML
HCT VFR BLD AUTO: 37.2 % (ref 34–46.6)
HGB BLD-MCNC: 11.7 G/DL (ref 12–15.9)
MCH RBC QN AUTO: 26.8 PG (ref 26.6–33)
MCHC RBC AUTO-ENTMCNC: 31.5 G/DL (ref 31.5–35.7)
MCV RBC AUTO: 85.1 FL (ref 79–97)
PLATELET # BLD AUTO: 306 10*3/MM3 (ref 140–450)
PMV BLD AUTO: 9.8 FL (ref 6–12)
RBC # BLD AUTO: 4.37 10*6/MM3 (ref 3.77–5.28)
RH BLD: POSITIVE
T&S EXPIRATION DATE: NORMAL
WBC NRBC COR # BLD: 9.94 10*3/MM3 (ref 3.4–10.8)

## 2023-03-14 PROCEDURE — G0378 HOSPITAL OBSERVATION PER HR: HCPCS

## 2023-03-14 PROCEDURE — 86900 BLOOD TYPING SEROLOGIC ABO: CPT | Performed by: NURSE PRACTITIONER

## 2023-03-14 PROCEDURE — 85027 COMPLETE CBC AUTOMATED: CPT | Performed by: NURSE PRACTITIONER

## 2023-03-14 PROCEDURE — 86850 RBC ANTIBODY SCREEN: CPT | Performed by: NURSE PRACTITIONER

## 2023-03-14 PROCEDURE — 99238 HOSP IP/OBS DSCHRG MGMT 30/<: CPT | Performed by: NURSE PRACTITIONER

## 2023-03-14 PROCEDURE — 84702 CHORIONIC GONADOTROPIN TEST: CPT | Performed by: NURSE PRACTITIONER

## 2023-03-14 PROCEDURE — 86901 BLOOD TYPING SEROLOGIC RH(D): CPT | Performed by: NURSE PRACTITIONER

## 2023-03-14 NOTE — CASE MANAGEMENT/SOCIAL WORK
Case Management Discharge Note      Final Note: dc home         Selected Continued Care - Discharged on 3/14/2023 Admission date: 3/13/2023 - Discharge disposition: Home or Self Care    Destination    No services have been selected for the patient.              Durable Medical Equipment    No services have been selected for the patient.              Dialysis/Infusion    No services have been selected for the patient.              Home Medical Care    No services have been selected for the patient.              Therapy    No services have been selected for the patient.              Community Resources    No services have been selected for the patient.              Community & DME    No services have been selected for the patient.                       Final Discharge Disposition Code: 01 - home or self-care

## 2023-03-14 NOTE — DISCHARGE SUMMARY
GEN SURGERY DISCHARGE SUMMARY     Kylee Wells  1995  [id number]       Date of Admission: 3/13/2023  Date of Discharge:  3/14/2023    Primary Discharge Diagnoses: Bleeding in early pregnancy     PCP  Patient Care Team:  Alena Cooper APRN as PCP - General (Nurse Practitioner)    Consults:   Consults     No orders found for last 30 day(s).          Operations and Procedures Performed:       No results found.      Discharge Medications:     Discharge Medications      Continue These Medications      Instructions Start Date   albuterol sulfate  (90 Base) MCG/ACT inhaler  Commonly known as: PROVENTIL HFA;VENTOLIN HFA;PROAIR HFA   albuterol sulfate HFA 90 mcg/actuation aerosol inhaler      benzonatate 200 MG capsule  Commonly known as: TESSALON   200 mg, Oral, 3 Times Daily PRN      cetirizine 10 MG tablet  Commonly known as: zyrTEC   Every 24 Hours      docusate sodium 100 MG capsule  Commonly known as: COLACE   100 mg, Oral, 2 Times Daily      famotidine 40 MG tablet  Commonly known as: Pepcid   40 mg, Oral, 2 Times Daily      ibuprofen 600 MG tablet  Commonly known as: ADVIL,MOTRIN   600 mg, Oral, Every 6 Hours PRN      ibuprofen 800 MG tablet  Commonly known as: ADVIL,MOTRIN   800 mg, Oral, Every 8 Hours PRN      lansoprazole 30 MG capsule  Commonly known as: PREVACID   No dose, route, or frequency recorded.      meclizine 12.5 MG tablet  Commonly known as: ANTIVERT   12.5 mg, Oral, 3 Times Daily PRN      methylPREDNISolone 4 MG dose pack  Commonly known as: MEDROL   Take as directed on package instructions.      neomycin-bacitracin-polymyxin 5-400-5000 ointment   1 application, Topical, 3 Times Daily      norethindrone 0.35 MG tablet  Commonly known as: MICRONOR   1 tablet, Oral, Daily      norgestrel-ethinyl estradiol 0.3-30 MG-MCG per tablet  Commonly known as: LOW-OGESTREL,CRYSELLE   1 tablet, Oral, Daily             History of Present Illness:  Pregnant [Z34.90]    Hospital Course  Ms.  Fozia is a 27-year-old -0-0-2 that was admitted from the office yesterday with concerns for possible ectopic pregnancy.  She scheduled an urgent office visit with complaints of irregular bleeding for the last 6 to 7 weeks.  She was found to have a positive urine pregnancy test.  Her ultrasound showed a 3 cm right adnexal mass with approximately 4 cm of free fluid noted in the pelvis.  She denies complaints of pain.  A stat quant was ordered with a result of 26.  It was agreed that she be admitted overnight for observation with a repeat quant this morning.  She was made n.p.o. after midnight in case surgical intervention was necessary.  This morning a repeat quant is 23.  She denies pain.  Reports her bleeding is about the same as it was yesterday.  Discussed case with Dr. Rubio.  Agreed to allow patient to go home with close follow-up in the office on Thursday or Friday of this week for repeat ultrasound and quant level.  Plan to follow quant to 0.  Patient has been counseled on warning signs symptoms of ectopic pregnancy and she understands when it is necessary for her to return the emergency room.  Her vital signs are stable.  She is afebrile. Rh +.       Lab Results:   Lab Results (most recent)     Procedure Component Value Units Date/Time    hCG, Quantitative, Pregnancy [[id number]] Collected: 23 0558    Specimen: Blood Updated: 23     HCG Quantitative 23.09 mIU/mL     Narrative:      HCG Ranges by Gestational Age    Females - non-pregnant premenopausal   </= 1mIU/mL HCG  Females - postmenopausal               </= 7mIU/mL HCG    3 Weeks       5.4   -      72 mIU/mL  4 Weeks      10.2   -     708 mIU/mL  5 Weeks       217   -   8,245 mIU/mL  6 Weeks       152   -  32,177 mIU/mL  7 Weeks     4,059   - 153,767 mIU/mL  8 Weeks    31,366   - 149,094 mIU/mL  9 Weeks    59,109   - 135,901 mIU/mL  10 Weeks   44,186   - 170,409 mIU/mL  12 Weeks   27,107   - 201,615 mIU/mL  14 Weeks   24,302    -  93,646 mIU/mL  15 Weeks   12,540   -  69,747 mIU/mL  16 Weeks    8,904   -  55,332 mIU/mL  17 Weeks    8,240   -  51,793 mIU/mL  18 Weeks    9,649   -  55,271 mIU/mL      CBC (No Diff) [[id number]]  (Abnormal) Collected: 03/14/23 0558    Specimen: Blood Updated: 03/14/23 0612     WBC 9.94 10*3/mm3      RBC 4.37 10*6/mm3      Hemoglobin 11.7 g/dL      Hematocrit 37.2 %      MCV 85.1 fL      MCH 26.8 pg      MCHC 31.5 g/dL      RDW 14.4 %      RDW-SD 45.5 fl      MPV 9.8 fL      Platelets 306 10*3/mm3         Imaging Results (Most Recent)     None            Condition on Discharge:  Satisfactory     Discharge Disposition  To home    Discharge Diet:      Dietary Orders (From admission, onward)     Start     Ordered    03/14/23 0001  NPO Diet NPO Type: Strict NPO  Diet Effective Midnight        Question:  NPO Type  Answer:  Strict NPO    03/13/23 2876                Activity at Discharge:  Pelvic rest      Follow-up Appointments    Time: Discharge 20 min (if over 30 minutes give explanation as to why it took greater than 30 minutes)

## 2023-03-16 ENCOUNTER — OFFICE VISIT (OUTPATIENT)
Dept: OBSTETRICS AND GYNECOLOGY | Facility: CLINIC | Age: 28
End: 2023-03-16
Payer: COMMERCIAL

## 2023-03-16 ENCOUNTER — LAB (OUTPATIENT)
Dept: LAB | Facility: HOSPITAL | Age: 28
End: 2023-03-16
Payer: COMMERCIAL

## 2023-03-16 VITALS
SYSTOLIC BLOOD PRESSURE: 122 MMHG | WEIGHT: 266 LBS | HEIGHT: 64 IN | BODY MASS INDEX: 45.41 KG/M2 | DIASTOLIC BLOOD PRESSURE: 80 MMHG

## 2023-03-16 DIAGNOSIS — N93.9 ABNORMAL UTERINE BLEEDING (AUB): Primary | ICD-10-CM

## 2023-03-16 DIAGNOSIS — N93.9 ABNORMAL UTERINE BLEEDING (AUB): ICD-10-CM

## 2023-03-16 DIAGNOSIS — O02.1 MISSED ABORTION: ICD-10-CM

## 2023-03-16 LAB — HCG INTACT+B SERPL-ACNC: 15.72 MIU/ML

## 2023-03-16 PROCEDURE — 36415 COLL VENOUS BLD VENIPUNCTURE: CPT

## 2023-03-16 PROCEDURE — 84702 CHORIONIC GONADOTROPIN TEST: CPT

## 2023-03-16 PROCEDURE — 1159F MED LIST DOCD IN RCRD: CPT | Performed by: STUDENT IN AN ORGANIZED HEALTH CARE EDUCATION/TRAINING PROGRAM

## 2023-03-16 PROCEDURE — 1160F RVW MEDS BY RX/DR IN RCRD: CPT | Performed by: STUDENT IN AN ORGANIZED HEALTH CARE EDUCATION/TRAINING PROGRAM

## 2023-03-16 PROCEDURE — 99214 OFFICE O/P EST MOD 30 MIN: CPT | Performed by: STUDENT IN AN ORGANIZED HEALTH CARE EDUCATION/TRAINING PROGRAM

## 2023-03-22 ENCOUNTER — LAB (OUTPATIENT)
Dept: OBSTETRICS AND GYNECOLOGY | Facility: CLINIC | Age: 28
End: 2023-03-22
Payer: COMMERCIAL

## 2023-03-22 DIAGNOSIS — O02.1 MISSED ABORTION: Primary | ICD-10-CM

## 2023-03-22 LAB — HCG INTACT+B SERPL-ACNC: 6.3 MIU/ML

## 2023-03-23 NOTE — PROGRESS NOTES
Hcg is down trending. Can we please draw one more next week; lab appt. We will message her with the information ( Need HcG Quant next Wednesday or Thursday)

## 2023-03-27 PROBLEM — N94.89 ADNEXAL MASS: Status: ACTIVE | Noted: 2023-03-27

## 2023-03-27 PROBLEM — Z32.01 POSITIVE URINE PREGNANCY TEST: Status: ACTIVE | Noted: 2023-03-27

## 2023-03-29 ENCOUNTER — LAB (OUTPATIENT)
Dept: OBSTETRICS AND GYNECOLOGY | Facility: CLINIC | Age: 28
End: 2023-03-29
Payer: COMMERCIAL

## 2023-03-29 DIAGNOSIS — O02.1 MISSED ABORTION: Primary | ICD-10-CM

## 2023-03-29 LAB — HCG INTACT+B SERPL-ACNC: 1.56 MIU/ML

## 2023-04-05 ENCOUNTER — TELEPHONE (OUTPATIENT)
Dept: OBSTETRICS AND GYNECOLOGY | Facility: CLINIC | Age: 28
End: 2023-04-05

## 2023-04-05 NOTE — TELEPHONE ENCOUNTER
Provider: DR DAMIAN  Caller: DELPHINE PETERSEN   Relationship to Patient: SELF   Pharmacy: [email or web address]   Phone Number: [phone]  Reason for Call: PT HAS HCG LEVELS DOWN AND WOULD LIKE TO START BIRTH CONTROL PT WOULD LIKE TO BE STARTED ON THE PILL PT IS WANTING TO KNOW IF SHE NEEDS TO COME BACK IN TO BE PRESCRIBED BIRTH CONTROL PILLS OR IF IT CAN BE CALLED IN FOR HER OR IF SHE NEEDS TO COME BACK IN FOR FOLLOW UP FROM MISSED  PLEASE CALL PT TO ADVISE   When was the patient last seen: 3/16/23

## 2023-04-14 ENCOUNTER — OFFICE VISIT (OUTPATIENT)
Dept: OBSTETRICS AND GYNECOLOGY | Facility: CLINIC | Age: 28
End: 2023-04-14
Payer: COMMERCIAL

## 2023-04-14 VITALS
DIASTOLIC BLOOD PRESSURE: 80 MMHG | BODY MASS INDEX: 46.16 KG/M2 | HEIGHT: 64 IN | WEIGHT: 270.4 LBS | SYSTOLIC BLOOD PRESSURE: 126 MMHG

## 2023-04-14 DIAGNOSIS — O02.1 MISSED ABORTION: Primary | ICD-10-CM

## 2023-04-14 DIAGNOSIS — Z30.011 ENCOUNTER FOR INITIAL PRESCRIPTION OF CONTRACEPTIVE PILLS: ICD-10-CM

## 2023-04-14 DIAGNOSIS — F32.0 CURRENT MILD EPISODE OF MAJOR DEPRESSIVE DISORDER, UNSPECIFIED WHETHER RECURRENT: ICD-10-CM

## 2023-04-14 RX ORDER — FLUOXETINE HYDROCHLORIDE 20 MG/1
20 CAPSULE ORAL DAILY
Qty: 60 CAPSULE | Refills: 6 | Status: SHIPPED | OUTPATIENT
Start: 2023-04-14

## 2023-04-14 RX ORDER — NORETHINDRONE ACETATE AND ETHINYL ESTRADIOL 1; .02 MG/1; MG/1
1 TABLET ORAL DAILY
Qty: 21 TABLET | Refills: 12 | Status: SHIPPED | OUTPATIENT
Start: 2023-04-14 | End: 2024-04-13

## 2023-04-14 NOTE — PROGRESS NOTES
"Here today to discuss contraception. Desires [facility] which she has used in the past. Also wants to start medication for Depression/Anxiety. Does see a therapist. Her partner is very supportive of her. Denies HI/SI.     Last appt: 28yo  with HcG and VB and pain concerning for ectopic. [facility]. At this time no pain, VB. Not sexually active tearful about loss of pregnancy.               Review of Systems   See HPI     Objective      /80   Ht 162.6 cm (64.02\")   Wt 123 kg (270 lb 6.4 oz)   LMP  (LMP Unknown)   BMI 46.39 kg/m²     Physical Exam    Vitals: VSS; AF    General Appearance:  Awake. Alert. Well developed. Well nourished. In no acute distress.  .  Neurological:  ° Oriented to time, place, and person.  Skin:  ° General appearance was normal. No bruising or ecchymosis.    Lab Review   HcG 23.09 14Mar  HcG 15.7 [id number]  HcG  1.56 [id number]     Imaging   Ultrasound - Pelvic Vaginal          Assessment  Depression   Continue with therapist. Prozac Rx     Contraception  Discussed with patient correct usage of oral contraceptive pills/patches/rings and what to do for a missed dose.  Patient reminded that condoms are the only form of contraceptive that can also prevent STDs and so use is encouraged with every act of coitus.  We reviewed ACHES warning signs (abdominal pain, chest pain, headache, eye vision changes or severe leg pain and or swelling).  Patient is encouraged to call for any questions or concerns.      I will see for f/u as below       Return in about 3 months (around 2023), or PCOS, Endometriosis f/u.      Blu Prabhakar, DO  2023        "

## 2023-05-08 ENCOUNTER — TELEMEDICINE (OUTPATIENT)
Dept: FAMILY MEDICINE CLINIC | Facility: TELEHEALTH | Age: 28
End: 2023-05-08
Payer: COMMERCIAL

## 2023-05-08 DIAGNOSIS — T36.95XA ANTIBIOTIC-INDUCED YEAST INFECTION: Primary | ICD-10-CM

## 2023-05-08 DIAGNOSIS — B37.9 ANTIBIOTIC-INDUCED YEAST INFECTION: Primary | ICD-10-CM

## 2023-05-08 RX ORDER — FLUCONAZOLE 150 MG/1
TABLET ORAL
Qty: 2 TABLET | Refills: 0 | Status: SHIPPED | OUTPATIENT
Start: 2023-05-08

## 2023-05-08 NOTE — PATIENT INSTRUCTIONS
Take one diflucan now and take the second after you finish the antibiotic. Follow up with PCP or Urgent Care if you do not have full resolution of symptoms.

## 2023-05-08 NOTE — PROGRESS NOTES
Chief Complaint   Patient presents with    Vaginitis       Video Visit Reason:   Free Text Description: I think I have a yeast infection  Subjective   Kylee Wells is a 28 y.o. female.     History of Present Illness  Vaginal itching, discharge and burning. She has been on an antibiotic for ear infection for several days. She is not having any urinary symptoms.  Vaginitis  This is a new problem. The current episode started yesterday. The problem has been gradually worsening. Pertinent negatives include no chills or urinary symptoms.     The following portions of the patient's history were reviewed and updated as appropriate: allergies, current medications, past medical history, and problem list.      Past Medical History:   Diagnosis Date    Anemia     Anxiety     Asthma     Endometriosis     Uncomfirmed    Gestational diabetes     Migraine     Rapid heart beat      Social History     Socioeconomic History    Marital status: Single    Number of children: 1   Tobacco Use    Smoking status: Never    Smokeless tobacco: Never   Vaping Use    Vaping Use: Never used   Substance and Sexual Activity    Alcohol use: Never    Drug use: Never    Sexual activity: Yes     Partners: Male     Birth control/protection: Birth control pill     medication documentation: reviewed and updated with patient and   Current Outpatient Medications:     albuterol sulfate  (90 Base) MCG/ACT inhaler, albuterol sulfate HFA 90 mcg/actuation aerosol inhaler, Disp: , Rfl:     amoxicillin-clavulanate (AUGMENTIN) 875-125 MG per tablet, Take 1 tablet by mouth 2 (Two) Times a Day for 10 days., Disp: 20 tablet, Rfl: 0    cetirizine (zyrTEC) 10 MG tablet, Daily., Disp: , Rfl:     FLUoxetine (PROzac) 20 MG capsule, Take 1 capsule by mouth Daily., Disp: 60 capsule, Rfl: 6    ibuprofen (ADVIL,MOTRIN) 800 MG tablet, Take 1 tablet by mouth Every 8 (Eight) Hours As Needed for Mild Pain., Disp: 15 tablet, Rfl: 0    norethindrone-ethinyl estradiol  (Loestrin 1/20, 21,) 1-20 MG-MCG per tablet, Take 1 tablet by mouth Daily., Disp: 21 tablet, Rfl: 12    fluconazole (Diflucan) 150 MG tablet, 150 mg x 1 dose and may repeat in 3 days if needed., Disp: 2 tablet, Rfl: 0  Review of Systems   Constitutional:  Negative for chills.   Genitourinary:  Positive for vaginal discharge (burning and itching). Negative for difficulty urinating, dysuria, flank pain, hematuria and pelvic pain.     Objective   Physical Exam  Constitutional:       General: She is not in acute distress.     Appearance: She is not ill-appearing.   Pulmonary:      Effort: Pulmonary effort is normal.   Neurological:      Mental Status: She is alert.   Psychiatric:         Speech: Speech normal.       Assessment & Plan   Diagnoses and all orders for this visit:    1. Antibiotic-induced yeast infection (Primary)  -     fluconazole (Diflucan) 150 MG tablet; 150 mg x 1 dose and may repeat in 3 days if needed.  Dispense: 2 tablet; Refill: 0                    Follow Up:  If your symptoms are not resolving by the completion of your treatment or are worsening, see your primary care provider for follow up. If you don't have a primary care provider, you may go to any Urgent Care for re-evaluation. If you develop any life threatening symptoms, go to the nearest Emergency Department immediately or call EMS.               The use of  Video Visit was utilized during this visit, using both [employer] and [employer]. The use of a video visit has been reviewed with the patient and verbal informed consent has been obtained. No technical difficulties occurred during the visit.    is located at [address] [place] [id number]  Provider is located at [place], [place]

## 2023-06-16 ENCOUNTER — APPOINTMENT (OUTPATIENT)
Dept: CT IMAGING | Facility: HOSPITAL | Age: 28
End: 2023-06-16
Payer: COMMERCIAL

## 2023-06-16 ENCOUNTER — HOSPITAL ENCOUNTER (EMERGENCY)
Facility: HOSPITAL | Age: 28
Discharge: HOME | End: 2023-06-16
Attending: EMERGENCY MEDICINE
Payer: COMMERCIAL

## 2023-06-16 VITALS
SYSTOLIC BLOOD PRESSURE: 113 MMHG | DIASTOLIC BLOOD PRESSURE: 59 MMHG | WEIGHT: 265 LBS | OXYGEN SATURATION: 96 % | BODY MASS INDEX: 42.59 KG/M2 | HEART RATE: 78 BPM | HEIGHT: 66 IN | RESPIRATION RATE: 18 BRPM | TEMPERATURE: 97.5 F

## 2023-06-16 DIAGNOSIS — R10.9 ABDOMINAL PAIN, UNSPECIFIED ABDOMINAL LOCATION: Primary | ICD-10-CM

## 2023-06-16 LAB
ALBUMIN SERPL-MCNC: 3.8 G/DL (ref 3.5–5.2)
ALBUMIN/GLOB SERPL: 1.1 G/DL
ALP SERPL-CCNC: 90 U/L (ref 39–117)
ALT SERPL W P-5'-P-CCNC: 9 U/L (ref 1–33)
ANION GAP SERPL CALCULATED.3IONS-SCNC: 10.1 MMOL/L (ref 5–15)
AST SERPL-CCNC: 9 U/L (ref 1–32)
BASOPHILS # BLD AUTO: 0.01 10*3/MM3 (ref 0–0.2)
BASOPHILS NFR BLD AUTO: 0.1 % (ref 0–1.5)
BILIRUB SERPL-MCNC: 0.2 MG/DL (ref 0–1.2)
BILIRUB UR QL STRIP: NEGATIVE
BUN SERPL-MCNC: 11 MG/DL (ref 6–20)
BUN/CREAT SERPL: 13.8 (ref 7–25)
CALCIUM SPEC-SCNC: 9.1 MG/DL (ref 8.6–10.5)
CHLORIDE SERPL-SCNC: 105 MMOL/L (ref 98–107)
CLARITY UR: CLEAR
CO2 SERPL-SCNC: 22.9 MMOL/L (ref 22–29)
COLOR UR: YELLOW
CREAT SERPL-MCNC: 0.8 MG/DL (ref 0.57–1)
DEPRECATED RDW RBC AUTO: 45.8 FL (ref 37–54)
EGFRCR SERPLBLD CKD-EPI 2021: 104.4 ML/MIN/1.73
EOSINOPHIL # BLD AUTO: 0.1 10*3/MM3 (ref 0–0.4)
EOSINOPHIL NFR BLD AUTO: 1.3 % (ref 0.3–6.2)
ERYTHROCYTE [DISTWIDTH] IN BLOOD BY AUTOMATED COUNT: 14.3 % (ref 12.3–15.4)
GLOBULIN UR ELPH-MCNC: 3.4 GM/DL
GLUCOSE SERPL-MCNC: 117 MG/DL (ref 65–99)
GLUCOSE UR STRIP-MCNC: NEGATIVE MG/DL
HCG SERPL QL: NEGATIVE
HCT VFR BLD AUTO: 37.7 % (ref 34–46.6)
HGB BLD-MCNC: 11.9 G/DL (ref 12–15.9)
HGB UR QL STRIP.AUTO: NEGATIVE
HOLD SPECIMEN: NORMAL
HOLD SPECIMEN: NORMAL
IMM GRANULOCYTES # BLD AUTO: 0.01 10*3/MM3 (ref 0–0.05)
IMM GRANULOCYTES NFR BLD AUTO: 0.1 % (ref 0–0.5)
KETONES UR QL STRIP: NEGATIVE
LEUKOCYTE ESTERASE UR QL STRIP.AUTO: NEGATIVE
LIPASE SERPL-CCNC: 29 U/L (ref 13–60)
LYMPHOCYTES # BLD AUTO: 2.28 10*3/MM3 (ref 0.7–3.1)
LYMPHOCYTES NFR BLD AUTO: 28.9 % (ref 19.6–45.3)
MCH RBC QN AUTO: 26.9 PG (ref 26.6–33)
MCHC RBC AUTO-ENTMCNC: 31.6 G/DL (ref 31.5–35.7)
MCV RBC AUTO: 85.1 FL (ref 79–97)
MONOCYTES # BLD AUTO: 0.48 10*3/MM3 (ref 0.1–0.9)
MONOCYTES NFR BLD AUTO: 6.1 % (ref 5–12)
NEUTROPHILS NFR BLD AUTO: 5 10*3/MM3 (ref 1.7–7)
NEUTROPHILS NFR BLD AUTO: 63.5 % (ref 42.7–76)
NITRITE UR QL STRIP: NEGATIVE
PH UR STRIP.AUTO: 5.5 [PH] (ref 4.5–8)
PLATELET # BLD AUTO: 273 10*3/MM3 (ref 140–450)
PMV BLD AUTO: 10.3 FL (ref 6–12)
POTASSIUM SERPL-SCNC: 3.9 MMOL/L (ref 3.5–5.2)
PROT SERPL-MCNC: 7.2 G/DL (ref 6–8.5)
PROT UR QL STRIP: NEGATIVE
RBC # BLD AUTO: 4.43 10*6/MM3 (ref 3.77–5.28)
SODIUM SERPL-SCNC: 138 MMOL/L (ref 136–145)
SP GR UR STRIP: 1.02 (ref 1–1.03)
UROBILINOGEN UR QL STRIP: NORMAL
WBC NRBC COR # BLD: 7.88 10*3/MM3 (ref 3.4–10.8)
WHOLE BLOOD HOLD COAG: NORMAL
WHOLE BLOOD HOLD SPECIMEN: NORMAL

## 2023-06-16 PROCEDURE — 85025 COMPLETE CBC W/AUTO DIFF WBC: CPT | Performed by: EMERGENCY MEDICINE

## 2023-06-16 PROCEDURE — 80053 COMPREHEN METABOLIC PANEL: CPT | Performed by: EMERGENCY MEDICINE

## 2023-06-16 PROCEDURE — 25010000002 ONDANSETRON PER 1 MG: Performed by: EMERGENCY MEDICINE

## 2023-06-16 PROCEDURE — 83690 ASSAY OF LIPASE: CPT | Performed by: EMERGENCY MEDICINE

## 2023-06-16 PROCEDURE — 84703 CHORIONIC GONADOTROPIN ASSAY: CPT | Performed by: EMERGENCY MEDICINE

## 2023-06-16 PROCEDURE — 25010000002 MORPHINE PER 10 MG: Performed by: EMERGENCY MEDICINE

## 2023-06-16 PROCEDURE — 81003 URINALYSIS AUTO W/O SCOPE: CPT | Performed by: EMERGENCY MEDICINE

## 2023-06-16 PROCEDURE — 74176 CT ABD & PELVIS W/O CONTRAST: CPT

## 2023-06-16 RX ORDER — ONDANSETRON 2 MG/ML
4 INJECTION INTRAMUSCULAR; INTRAVENOUS ONCE
Status: COMPLETED | OUTPATIENT
Start: 2023-06-16 | End: 2023-06-16

## 2023-06-16 RX ORDER — ACETAMINOPHEN AND CODEINE PHOSPHATE 120; 12 MG/5ML; MG/5ML
1 SOLUTION ORAL DAILY
COMMUNITY
Start: 2023-05-08

## 2023-06-16 RX ORDER — ONDANSETRON 4 MG/1
4 TABLET, FILM COATED ORAL EVERY 8 HOURS PRN
Qty: 21 TABLET | Refills: 0 | Status: SHIPPED | OUTPATIENT
Start: 2023-06-16 | End: 2023-06-23

## 2023-06-16 RX ORDER — SODIUM CHLORIDE 0.9 % (FLUSH) 0.9 %
10 SYRINGE (ML) INJECTION AS NEEDED
Status: DISCONTINUED | OUTPATIENT
Start: 2023-06-16 | End: 2023-06-16 | Stop reason: HOSPADM

## 2023-06-16 RX ORDER — MELOXICAM 15 MG/1
15 TABLET ORAL DAILY
Qty: 7 TABLET | Refills: 0 | Status: SHIPPED | OUTPATIENT
Start: 2023-06-16

## 2023-06-16 RX ADMIN — ONDANSETRON 4 MG: 2 INJECTION INTRAMUSCULAR; INTRAVENOUS at 09:44

## 2023-06-16 RX ADMIN — MORPHINE SULFATE 4 MG: 4 INJECTION, SOLUTION INTRAMUSCULAR; INTRAVENOUS at 10:20

## 2023-06-16 RX ADMIN — SODIUM CHLORIDE 1000 ML: 9 INJECTION, SOLUTION INTRAVENOUS at 09:35

## 2023-06-16 NOTE — Clinical Note
JAMEE JENKINS  [facility] EMERGENCY DEPARTMENT  [address]  [place] [address]  Phone: [phone]    Kylee Wells was seen and treated in our emergency department on 6/16/2023.  She may return to work on 06/19/2023.         Thank you for choosing [facility].    Surya Paredes MD

## 2023-06-16 NOTE — ED PROVIDER NOTES
Subjective   History of Present Illness  28-year-old female past medical history significant for endometriosis and anxiety and a tubal pregnancy several months ago who is on birth control pills at present that do not allow her to have a period secondary to dysmenorrhea.  Patient presents emergency room complaining of left flank pain that started last night associated with nausea and anorexia.  Patient denies fever cough shortness of breath chest pain diarrhea or dysuria patient states this pain feels different from her normal endometriosis because it is sharp and waxes and wanes in intensity.  Patient has never had kidney stones in the past.    Review of Systems   Constitutional:  Negative for activity change, appetite change, chills, diaphoresis, fatigue and fever.   HENT:  Negative for congestion, rhinorrhea and sore throat.    Eyes:  Negative for photophobia and visual disturbance.   Respiratory:  Negative for cough and shortness of breath.    Cardiovascular:  Negative for chest pain, palpitations and leg swelling.   Gastrointestinal:  Positive for nausea. Negative for abdominal distention, abdominal pain, diarrhea and vomiting.   Genitourinary:  Positive for flank pain. Negative for dysuria.   Musculoskeletal:  Negative for arthralgias and back pain.   Skin:  Negative for rash.   Neurological:  Negative for dizziness, weakness and headaches.   Psychiatric/Behavioral:  Negative for agitation and behavioral problems.      Past Medical History:   Diagnosis Date    Anemia     Anxiety     Asthma     Endometriosis     Uncomfirmed    Gestational diabetes     Migraine     Rapid heart beat        Allergies   Allergen Reactions    Latex Hives       Past Surgical History:   Procedure Laterality Date    CHOLECYSTECTOMY      TONSILLECTOMY         Family History   Problem Relation Age of Onset    Breast cancer Maternal Grandmother     Colon cancer Maternal Grandmother     Breast cancer Maternal Grandfather     Lung cancer  Maternal Grandfather        Social History     Socioeconomic History    Marital status: Single    Number of children: 1   Tobacco Use    Smoking status: Never    Smokeless tobacco: Never   Vaping Use    Vaping Use: Never used   Substance and Sexual Activity    Alcohol use: Never    Drug use: Never    Sexual activity: Yes     Partners: Male     Birth control/protection: Birth control pill           Objective   Physical Exam  Vitals and nursing note reviewed.   Constitutional:       General: She is not in acute distress.     Appearance: Normal appearance. She is not ill-appearing, toxic-appearing or diaphoretic.   HENT:      Head: Normocephalic and atraumatic.      Nose: Nose normal.      Mouth/Throat:      Mouth: Mucous membranes are moist.   Eyes:      Conjunctiva/sclera: Conjunctivae normal.   Cardiovascular:      Rate and Rhythm: Normal rate and regular rhythm.      Pulses: Normal pulses.   Pulmonary:      Effort: Pulmonary effort is normal.      Breath sounds: Normal breath sounds.   Abdominal:      General: Abdomen is flat. There is no distension.      Tenderness: There is no abdominal tenderness.   Musculoskeletal:         General: No swelling. Normal range of motion.      Cervical back: Normal range of motion and neck supple.   Skin:     General: Skin is warm and dry.   Neurological:      General: No focal deficit present.      Mental Status: She is alert and oriented to person, place, and time.   Psychiatric:         Mood and Affect: Mood normal.       Procedures           ED Course  ED Course as of 06/16/23 1241   Fri Jun 16, 2023   1220 Lab work is reassuring with normal urinalysis negative pregnancy test normal CMP and a normal CBC patient CT abdomen pelvis shows a 5 mm stone in the renal pelvis but no obstruction and no ureteral stone present.  Subsequently no emergent cause as to patient's abdominal pain can be delineated at this time. []   1222 Discussed lab and radiologic findings with patient.  Will  prescribe patient nausea medicine as well as pain medicine and need to follow-up with primary care for continued symptoms.  Patient also understands she can return to the emergency room for new persistent or worsening symptoms. []   1234 Patient states that she has been on birth control pills that do not allow her to have a period for years.  I recommended to patient to follow-up with her OB/GYN not only to discuss the left lower quadrant pain which may be endometriosis in nature but also suggested her to revisit her birth control regimen as well.  Patient states she understands and agrees with plan. []      ED Course User Index  [] Surya Paredes MD                                           Medical Decision Making  My differential diagnosis for abdominal pain includes but is not limited to:  Gastritis, gastroenteritis, peptic ulcer disease, GERD, esophageal perforation, acute appendicitis, mesenteric adenitis, Meckel's diverticulum, epiploic appendagitis, diverticulitis, colon cancer, ulcerative colitis, Crohn's disease, intussusception, small bowel obstruction, adhesions, ischemic bowel, perforated viscus, ileus, obstipation, biliary colic, cholecystitis, cholelithiasis, Chiki-Mariano Rafy, hepatitis, pancreatitis, common bile duct obstruction, cholangitis, bile leak, splenic trauma, splenic rupture, splenic infarction, splenic abscess, abdominal wall hematoma, abdominal wall abscess, intra-abdominal abscess, ascites, spontaneous bacterial peritonitis, hernia, UTI, cystitis, prostatitis, ureterolithiasis, urinary obstruction, AAA, myocardial infarction, pneumonia, cancer, porphyria, DKA, medications, sickle cell, viral syndrome, zoster     Amount and/or Complexity of Data Reviewed  Labs: ordered. Decision-making details documented in ED Course.    Risk  Prescription drug management.        Final diagnoses:   Abdominal pain, unspecified abdominal location       ED Disposition  ED Disposition       ED  Disposition   Discharge    Condition   Stable    Comment   --               Jinny Meade, APRN  [address]  [address]  [place] [address] [phone]    Schedule an appointment as soon as possible for a visit       [facility] Emergency Department  [address]  [place] [address] [phone]  Go to   As needed         Medication List        New Prescriptions      meloxicam 15 MG tablet  Commonly known as: MOBIC  Take 1 tablet by mouth Daily.     ondansetron 4 MG tablet  Commonly known as: ZOFRAN  Take 1 tablet by mouth Every 8 (Eight) Hours As Needed for Nausea or Vomiting for up to 7 days.               Where to Get Your Medications        These medications were sent to [id number] - [place], [place] - [address] - [phone] PH - [phone]   [address], [place] [address]      Phone: [phone]   meloxicam 15 MG tablet  ondansetron 4 MG tablet            Surya Paredes MD  06/16/23 3863

## 2023-06-20 PROBLEM — R10.2 PELVIC PAIN: Status: ACTIVE | Noted: 2023-06-20

## 2023-07-27 ENCOUNTER — TELEMEDICINE (OUTPATIENT)
Dept: FAMILY MEDICINE CLINIC | Facility: TELEHEALTH | Age: 28
End: 2023-07-27
Payer: COMMERCIAL

## 2023-07-27 VITALS — WEIGHT: 250 LBS | HEIGHT: 66 IN | BODY MASS INDEX: 40.18 KG/M2

## 2023-07-27 DIAGNOSIS — H10.9 CONJUNCTIVITIS OF RIGHT EYE, UNSPECIFIED CONJUNCTIVITIS TYPE: Primary | ICD-10-CM

## 2023-07-27 RX ORDER — OFLOXACIN 3 MG/ML
2 SOLUTION/ DROPS OPHTHALMIC 4 TIMES DAILY
Qty: 5 ML | Refills: 0 | Status: SHIPPED | OUTPATIENT
Start: 2023-07-27 | End: 2023-08-01

## 2023-07-27 NOTE — PROGRESS NOTES
"You have chosen to receive care through a telehealth visit.  Do you consent to use a video/audio connection for your medical care today? Yes     CHIEF COMPLAINT  Cc: eye problem    HPI  Kylee Wells is a 28 y.o. female  presents with complaint of eye problem. It started last night. The right eye is red, itching, burning and uncomfortable. There is \"goo\" coming from it. No exposure to conjunctivitis that she is aware of at this time but she did go to a child's school function.    Review of Systems   Constitutional:  Negative for fever.   HENT:  Negative for congestion and rhinorrhea.    Eyes:  Positive for pain (right), discharge (right), redness (right) and itching (right, burns too).     Past Medical History:   Diagnosis Date    Anemia     Anxiety     Asthma     Endometriosis     Uncomfirmed    Gestational diabetes     Migraine     Rapid heart beat        Family History   Problem Relation Age of Onset    Breast cancer Maternal Grandmother     Colon cancer Maternal Grandmother     Breast cancer Maternal Grandfather     Lung cancer Maternal Grandfather        Social History     Socioeconomic History    Marital status: Single    Number of children: 1   Tobacco Use    Smoking status: Never    Smokeless tobacco: Never   Vaping Use    Vaping Use: Never used   Substance and Sexual Activity    Alcohol use: Never    Drug use: Never    Sexual activity: Yes     Partners: Male     Birth control/protection: Birth control pill       Kylee Wells  reports that she has never smoked. She has never used smokeless tobacco..    Ht 167.6 cm (66\")   Wt 113 kg (250 lb)   LMP  (LMP Unknown) Comment: has been off of birth control for a month  BMI 40.35 kg/m²     PHYSICAL EXAM  Physical Exam   Constitutional: She is oriented to person, place, and time. She appears well-developed and well-nourished.   HENT:   Head: Normocephalic and atraumatic.   Right Ear: External ear normal.   Left Ear: External ear normal.   Nose: Nose " normal.   Eyes: Lids are normal. Right eye exhibits discharge. Left eye exhibits no discharge. Right conjunctiva is injected. Left conjunctiva is not injected.   Pulmonary/Chest: No accessory muscle usage. No tachypnea and no bradypnea.  No respiratory distress.No use of oxygen by nasal cannulaNo use of oxygen by mask noted.  Abdominal: Abdomen appears normal.   Neurological: She is alert and oriented to person, place, and time. No cranial nerve deficit.   Skin: Her skin appears normal.  Psychiatric: She has a normal mood and affect. Her speech is normal and behavior is normal. Judgment and thought content normal.     Results for orders placed or performed during the hospital encounter of 07/20/23   POCT VERITOR SARS-CoV-2 Antigen ([id number])    Specimen: Nasopharynx; Swab   Result Value Ref Range    SARS Antigen Not Detected Not Detected, Presumptive Negative    Internal Control Passed Passed    Lot Number [id number]     Expiration Date 08/27/23        Diagnoses and all orders for this visit:    1. Conjunctivitis of right eye, unspecified conjunctivitis type (Primary)    Other orders  -     ofloxacin (Ocuflox) 0.3 % ophthalmic solution; Administer 2 drops to the right eye 4 (Four) Times a Day for 5 days.  Dispense: 5 mL; Refill: 0    Wash eye with clean cloth and baby shampoo  Apply eye drops as directed  Try not to touch eyes  Wash hands frequently  Do not wear contact until eye is healed    FOLLOW-UP  If symptoms worsen or persist follow up with PCP, [facility] or Urgent Care    Patient verbalizes understanding of medication dosage, comfort measures, instructions for treatment and follow-up.    ARNOL Hahn  07/27/2023  08:21 EDT    The use of a video visit has been reviewed with the patient and verbal informed consent has been obtained. Myself and Kylee Wells participated in this visit. The patient is located in [address] [place] [address].    I am located in [place], [place]. Garrett and  [employer] Video Client were utilized. I spent 25 minutes in the patient's chart for this visit.

## 2023-08-04 ENCOUNTER — APPOINTMENT (OUTPATIENT)
Dept: GENERAL RADIOLOGY | Facility: HOSPITAL | Age: 28
End: 2023-08-04
Payer: COMMERCIAL

## 2023-08-04 ENCOUNTER — HOSPITAL ENCOUNTER (EMERGENCY)
Facility: HOSPITAL | Age: 28
Discharge: HOME | End: 2023-08-04
Attending: EMERGENCY MEDICINE
Payer: COMMERCIAL

## 2023-08-04 VITALS
OXYGEN SATURATION: 98 % | WEIGHT: 271.3 LBS | RESPIRATION RATE: 16 BRPM | TEMPERATURE: 97 F | SYSTOLIC BLOOD PRESSURE: 136 MMHG | HEIGHT: 66 IN | HEART RATE: 88 BPM | BODY MASS INDEX: 43.6 KG/M2 | DIASTOLIC BLOOD PRESSURE: 94 MMHG

## 2023-08-04 DIAGNOSIS — Z32.01 PREGNANCY TEST POSITIVE: ICD-10-CM

## 2023-08-04 DIAGNOSIS — S93.601A SPRAIN OF RIGHT FOOT, INITIAL ENCOUNTER: ICD-10-CM

## 2023-08-04 DIAGNOSIS — S39.012A STRAIN, BACK, INITIAL ENCOUNTER: ICD-10-CM

## 2023-08-04 DIAGNOSIS — M79.609 ACUTE EXTREMITY PAIN: ICD-10-CM
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
## 2023-08-04 LAB
B-HCG UR QL: POSITIVE
BACTERIA UR QL AUTO: ABNORMAL /HPF
BILIRUB UR QL STRIP: NEGATIVE
CLARITY UR: CLEAR
COLOR UR: YELLOW
GLUCOSE UR STRIP-MCNC: NEGATIVE MG/DL
HGB UR QL STRIP.AUTO: NEGATIVE
HYALINE CASTS UR QL AUTO: ABNORMAL /LPF
KETONES UR QL STRIP: NEGATIVE
LEUKOCYTE ESTERASE UR QL STRIP.AUTO: NEGATIVE
NITRITE UR QL STRIP: NEGATIVE
PH UR STRIP.AUTO: >=9 [PH] (ref 4.5–8)
PROT UR QL STRIP: ABNORMAL
RBC # UR STRIP: ABNORMAL /HPF
REF LAB TEST METHOD: ABNORMAL
SP GR UR STRIP: 1.02 (ref 1–1.03)
SQUAMOUS #/AREA URNS HPF: ABNORMAL /HPF
UROBILINOGEN UR QL STRIP: ABNORMAL
WBC # UR STRIP: ABNORMAL /HPF

## 2023-08-04 PROCEDURE — 99283 EMERGENCY DEPT VISIT LOW MDM: CPT

## 2023-08-04 PROCEDURE — 81025 URINE PREGNANCY TEST: CPT | Performed by: EMERGENCY MEDICINE

## 2023-08-04 PROCEDURE — 81001 URINALYSIS AUTO W/SCOPE: CPT | Performed by: EMERGENCY MEDICINE

## 2023-08-04 PROCEDURE — 73630 X-RAY EXAM OF FOOT: CPT

## 2023-08-04 RX ORDER — BUSPIRONE HYDROCHLORIDE 7.5 MG/1
1 TABLET ORAL EVERY 12 HOURS SCHEDULED
COMMUNITY
Start: 2023-07-24

## 2023-08-04 NOTE — Clinical Note
JAMEE JENKINS  [facility] EMERGENCY DEPARTMENT  [address] [place] [address]  Phone: [phone]    Kylee Wells was seen and treated in our emergency department on 8/4/2023.  She may return to work on 08/07/2023.         Thank you for choosing [facility].    Sebastián Ramos MD
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
## 2023-08-04 NOTE — Clinical Note
JAMEE JENKINS  [facility] EMERGENCY DEPARTMENT  [address] [place] [address]  Phone: [phone]    Kylee Wells was seen and treated in our emergency department on 8/4/2023.  She may return to work on 08/09/2023.         Thank you for choosing [facility].    Sebastián Ramos MD

## 2023-08-04 NOTE — Clinical Note
JAMEE JENKINS  [facility] EMERGENCY DEPARTMENT  [address] [place] [address]  Phone: [phone]    Kylee Wells was seen and treated in our emergency department on 8/4/2023.  She may return to work on 08/07/2023.         Thank you for choosing [facility].    Sebastián Ramos MD

## 2023-08-07 ENCOUNTER — OFFICE VISIT (OUTPATIENT)
Dept: OBSTETRICS AND GYNECOLOGY | Facility: CLINIC | Age: 28
End: 2023-08-07
Payer: COMMERCIAL

## 2023-08-07 VITALS
WEIGHT: 272 LBS | BODY MASS INDEX: 43.71 KG/M2 | SYSTOLIC BLOOD PRESSURE: 148 MMHG | DIASTOLIC BLOOD PRESSURE: 88 MMHG | HEIGHT: 66 IN

## 2023-08-07 DIAGNOSIS — N91.2 AMENORRHEA: Primary | ICD-10-CM

## 2023-08-07 DIAGNOSIS — K21.9 GASTROESOPHAGEAL REFLUX DISEASE WITHOUT ESOPHAGITIS: ICD-10-CM

## 2023-08-07 LAB
B-HCG UR QL: POSITIVE
BILIRUB BLD-MCNC: NEGATIVE MG/DL
CLARITY, POC: CLEAR
COLOR UR: YELLOW
EXPIRATION DATE: ABNORMAL
GLUCOSE UR STRIP-MCNC: NEGATIVE MG/DL
INTERNAL NEGATIVE CONTROL: NEGATIVE
INTERNAL POSITIVE CONTROL: POSITIVE
KETONES UR QL: NEGATIVE
LEUKOCYTE EST, POC: NEGATIVE
Lab: 55
NITRITE UR-MCNC: NEGATIVE MG/ML
PH UR: 5 [PH] (ref 5–8)
PROT UR STRIP-MCNC: NEGATIVE MG/DL
RBC # UR STRIP: NEGATIVE /UL
SP GR UR: 1 (ref 1–1.03)
UROBILINOGEN UR QL: NORMAL

## 2023-08-07 RX ORDER — ONDANSETRON HYDROCHLORIDE 8 MG/1
1 TABLET, FILM COATED ORAL EVERY 8 HOURS PRN
COMMUNITY

## 2023-08-07 RX ORDER — CYCLOBENZAPRINE HCL 10 MG
TABLET ORAL
COMMUNITY
End: 2023-08-07

## 2023-08-07 RX ORDER — FAMOTIDINE 20 MG/1
20 TABLET, FILM COATED ORAL 2 TIMES DAILY
Qty: 60 TABLET | Refills: 3 | Status: SHIPPED | OUTPATIENT
Start: 2023-08-07 | End: 2023-12-05

## 2023-08-07 RX ORDER — PNV NO.95/FERROUS FUM/FOLIC AC 28MG-0.8MG
1 TABLET ORAL DAILY
Qty: 30 TABLET | Refills: 11 | Status: SHIPPED | OUTPATIENT
Start: 2023-08-07
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
## 2023-08-29 ENCOUNTER — TELEPHONE (OUTPATIENT)
Dept: OBSTETRICS AND GYNECOLOGY | Facility: CLINIC | Age: 28
End: 2023-08-29

## 2023-08-29 ENCOUNTER — HOSPITAL ENCOUNTER (EMERGENCY)
Facility: HOSPITAL | Age: 28
Discharge: HOME | End: 2023-08-29
Attending: EMERGENCY MEDICINE
Payer: COMMERCIAL

## 2023-08-29 VITALS
DIASTOLIC BLOOD PRESSURE: 64 MMHG | HEIGHT: 66 IN | RESPIRATION RATE: 12 BRPM | BODY MASS INDEX: 41.24 KG/M2 | OXYGEN SATURATION: 99 % | TEMPERATURE: 97.1 F | SYSTOLIC BLOOD PRESSURE: 122 MMHG | WEIGHT: 256.6 LBS | HEART RATE: 68 BPM

## 2023-08-29 DIAGNOSIS — M54.50 ACUTE RIGHT-SIDED LOW BACK PAIN WITHOUT SCIATICA: Primary | ICD-10-CM

## 2023-08-29 LAB
BACTERIA UR QL AUTO: ABNORMAL /HPF
BILIRUB UR QL STRIP: NEGATIVE
CLARITY UR: CLEAR
COLOR UR: YELLOW
GLUCOSE UR STRIP-MCNC: NEGATIVE MG/DL
HGB UR QL STRIP.AUTO: NEGATIVE
HYALINE CASTS UR QL AUTO: ABNORMAL /LPF
KETONES UR QL STRIP: NEGATIVE
LEUKOCYTE ESTERASE UR QL STRIP.AUTO: ABNORMAL
NITRITE UR QL STRIP: NEGATIVE
PH UR STRIP.AUTO: 5.5 [PH] (ref 4.5–8)
PROT UR QL STRIP: NEGATIVE
RBC # UR STRIP: ABNORMAL /HPF
REF LAB TEST METHOD: ABNORMAL
SP GR UR STRIP: 1.02 (ref 1–1.03)
SQUAMOUS #/AREA URNS HPF: ABNORMAL /HPF
UROBILINOGEN UR QL STRIP: ABNORMAL
WBC # UR STRIP: ABNORMAL /HPF

## 2023-08-29 PROCEDURE — 99283 EMERGENCY DEPT VISIT LOW MDM: CPT

## 2023-08-29 PROCEDURE — 81001 URINALYSIS AUTO W/SCOPE: CPT | Performed by: EMERGENCY MEDICINE

## 2023-08-29 RX ORDER — ACETAMINOPHEN 500 MG
1000 TABLET ORAL ONCE
Status: COMPLETED | OUTPATIENT
Start: 2023-08-29 | End: 2023-08-29

## 2023-08-29 RX ADMIN — ACETAMINOPHEN 1000 MG: 500 TABLET ORAL at 09:00

## 2023-08-29 NOTE — Clinical Note
JAMEE JENKINS  [facility] EMERGENCY DEPARTMENT  [address] [place] [address]  Phone: [phone]    Kylee Wells was seen and treated in our emergency department on 8/29/2023.  She may return to work on 08/30/2023.         Thank you for choosing [facility].    Sebastián Ramos MD
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
## 2023-08-29 NOTE — ED NOTES
Pt sleeping soundly, snoring softly. Awakens to light touch. States feels ready to go home. Dressed self independently. Ambulatory with slow steady gait.

## 2023-08-29 NOTE — TELEPHONE ENCOUNTER
Provider: ARNOL GAMBLE  Caller: DELPHINE BRODERICK  Relationship to Patient: SELF  Pharmacy: [id number], [place], [place]  Phone Number: [phone]  Reason for Call: PATIENT CALLED AND STATED THAT SHE WENT TO THE E.D. TODAY FOR BACK PAIN - SHE WAS ADVISED THAT SHE MAY HAVE PULLED SOMETHING IN HER BACK, BUT THEY WERE UNABLE TO PROVIDE ANY MEDICATIONS DUE TO PREGNANCY AND RECOMMENDED SHE F/U W/ HER OBGYN ASAP  When was the patient last seen: 8/7/23  When did it start: 3 DAYS AGO  Where is it located: STARTED AS LOWER BACK PAIN, HAS SPREAD ACROSS MOST OF HER BACK NOW  Characteristics of symptom/severity: BACK PAIN  Timing- Is it constant or intermittent: DULL PAIN CONSTANT / WORSE WITH MOVEMENT  What therapies/medications have you tried: TRIED TYLENOL, DIDN'T HELP WITH PAIN

## 2023-08-30 DIAGNOSIS — M54.9 BACK PAIN AFFECTING PREGNANCY, ANTEPARTUM: Primary | ICD-10-CM

## 2023-08-30 DIAGNOSIS — O99.891 BACK PAIN AFFECTING PREGNANCY, ANTEPARTUM: Primary | ICD-10-CM

## 2023-09-11 ENCOUNTER — OFFICE VISIT (OUTPATIENT)
Dept: OBSTETRICS AND GYNECOLOGY | Facility: CLINIC | Age: 28
End: 2023-09-11
Payer: COMMERCIAL

## 2023-09-11 VITALS
DIASTOLIC BLOOD PRESSURE: 98 MMHG | HEIGHT: 66 IN | SYSTOLIC BLOOD PRESSURE: 140 MMHG | BODY MASS INDEX: 44.36 KG/M2 | WEIGHT: 276 LBS

## 2023-09-11 DIAGNOSIS — R11.0 NAUSEA: ICD-10-CM

## 2023-09-11 DIAGNOSIS — N92.6 MISSED MENSES: ICD-10-CM

## 2023-09-11 LAB
B-HCG UR QL: POSITIVE
BILIRUB BLD-MCNC: NEGATIVE MG/DL
CLARITY, POC: CLEAR
COLOR UR: YELLOW
EXPIRATION DATE: ABNORMAL
GLUCOSE UR STRIP-MCNC: NEGATIVE MG/DL
INTERNAL NEGATIVE CONTROL: NEGATIVE
INTERNAL POSITIVE CONTROL: POSITIVE
KETONES UR QL: ABNORMAL
LEUKOCYTE EST, POC: NEGATIVE
Lab: 55
NITRITE UR-MCNC: NEGATIVE MG/ML
PH UR: 8 [PH] (ref 5–8)
PROT UR STRIP-MCNC: ABNORMAL MG/DL
RBC # UR STRIP: NEGATIVE /UL
SP GR UR: 1 (ref 1–1.03)
UROBILINOGEN UR QL: NORMAL

## 2023-09-11 RX ORDER — ONDANSETRON 4 MG/1
4 TABLET, ORALLY DISINTEGRATING ORAL EVERY 8 HOURS PRN
Qty: 30 TABLET | Refills: 2 | Status: SHIPPED | OUTPATIENT
Start: 2023-09-11

## 2023-09-11 RX ORDER — DIPHENHYDRAMINE HYDROCHLORIDE 25 MG/1
25 CAPSULE ORAL NIGHTLY
Qty: 60 TABLET | Refills: 1 | Status: SHIPPED | OUTPATIENT
Start: 2023-09-11

## 2023-09-11 NOTE — PROGRESS NOTES
"Subjective     Chief Complaint   Patient presents with    Amenorrhea     LMP- end of  unknown.       Kylee Wells is a 28 y.o.  whose LMP is Patient's last menstrual period was 2023 (exact date).. This patient is new to me - no.  She presents with f/u US for viability    HPI    Here for repeat US to confirm viability.  Seen in ER 2 weeks ago for back pain; didn't start on any medication.  Back pain feels much better now.   Having a hard time sleeping.        The following portions of the patient's history were reviewed and updated as appropriate:vital signs, allergies, current medications, past medical history, past social history, past surgical history, and problem list      Review of Systems     Review of Systems   Gastrointestinal:  Positive for nausea. Negative for vomiting.   Genitourinary:  Positive for menstrual problem. Negative for dysuria, flank pain and vaginal bleeding.   Psychiatric/Behavioral:  Positive for sleep disturbance.      Objective      /98   Ht 167.6 cm (65.98\")   Wt 125 kg (276 lb)   LMP 2023 (Exact Date) Comment: Due 2024  BMI 44.57 kg/m²     Physical Exam    Physical Exam  Vitals reviewed.   Constitutional:       General: She is awake. She is not in acute distress.     Appearance: She is not ill-appearing.   Eyes:      Conjunctiva/sclera: Conjunctivae normal.   Pulmonary:      Effort: Pulmonary effort is normal. No respiratory distress.   Musculoskeletal:      Cervical back: Neck supple. No rigidity.   Skin:     General: Skin is warm and dry.      Capillary Refill: Capillary refill takes less than 2 seconds.   Neurological:      Mental Status: She is alert and oriented to person, place, and time.   Psychiatric:         Mood and Affect: Mood and affect normal.         Behavior: Behavior normal.       Lab Review   Labs: Urine pregnancy test, Urinalysis - with micro     Imaging   Ultrasound - Pelvic Vaginal    Assessment/Plan  Diagnoses and all " orders for this visit:    1. Missed menses (Primary)  -     POC Pregnancy, Urine    2. Nausea  -     ondansetron ODT (ZOFRAN-ODT) 4 MG disintegrating tablet; Place 1 tablet on the tongue Every 8 (Eight) Hours As Needed for Nausea or Vomiting.  Dispense: 30 tablet; Refill: 2  -     doxylamine (UNISOM) 25 MG tablet; Take 1 tablet by mouth At Night As Needed for Sleep.  Dispense: 60 tablet; Refill: 2  -     vitamin B-6 (PYRIDOXINE) 25 MG tablet; Take 1 tablet by mouth Every Night.  Dispense: 60 tablet; Refill: 1    TVUS- UT is anteverted. Single, viable IUP seen at 10w3d. YS appears WNL. FHR 171bpm     Discussed findings with pt; viability is confirmed.  ERX Zofran and Unisom/Vit.B 6 (take at bedtime).    Return in about 1 week (around 9/18/2023) for New OB exam/labs, PAP.    I spent 15 minutes caring for Kylee on this date of service. This time includes time spent by me in the following activities: preparing for the visit, reviewing tests, obtaining and/or reviewing a separately obtained history, performing a medically appropriate examination and/or evaluation, counseling and educating the patient/family/caregiver, ordering medications, tests, or procedures, and documenting information in the medical record     Teresita Segura, ARNOL  9/11/2023  13:50 EDT

## 2023-09-18 ENCOUNTER — INITIAL PRENATAL (OUTPATIENT)
Dept: OBSTETRICS AND GYNECOLOGY | Facility: CLINIC | Age: 28
End: 2023-09-18
Payer: COMMERCIAL

## 2023-09-18 VITALS — DIASTOLIC BLOOD PRESSURE: 74 MMHG | BODY MASS INDEX: 43.18 KG/M2 | SYSTOLIC BLOOD PRESSURE: 118 MMHG | WEIGHT: 267.4 LBS

## 2023-09-18 DIAGNOSIS — Z01.419 PAP SMEAR, LOW-RISK: ICD-10-CM

## 2023-09-18 DIAGNOSIS — O09.299 HISTORY OF GESTATIONAL DIABETES IN PRIOR PREGNANCY, CURRENTLY PREGNANT: ICD-10-CM

## 2023-09-18 DIAGNOSIS — Z86.32 HISTORY OF GESTATIONAL DIABETES IN PRIOR PREGNANCY, CURRENTLY PREGNANT: ICD-10-CM

## 2023-09-18 DIAGNOSIS — Z34.00 INITIAL OBSTETRIC VISIT, ANTEPARTUM: Primary | ICD-10-CM

## 2023-09-18 DIAGNOSIS — Z36.9 ENCOUNTER FOR ANTENATAL SCREENING, UNSPECIFIED: ICD-10-CM

## 2023-09-18 DIAGNOSIS — Z01.419 ROUTINE GYNECOLOGICAL EXAMINATION: ICD-10-CM

## 2023-09-18 DIAGNOSIS — K21.9 GASTROESOPHAGEAL REFLUX DISEASE WITHOUT ESOPHAGITIS: ICD-10-CM

## 2023-09-18 DIAGNOSIS — O99.210 OBESITY IN PREGNANCY, ANTEPARTUM: ICD-10-CM

## 2023-09-18 PROBLEM — O99.019 MATERNAL ANEMIA IN PREGNANCY, ANTEPARTUM: Status: RESOLVED | Noted: 2022-04-02 | Resolved: 2023-09-18

## 2023-09-18 PROBLEM — Z32.01 POSITIVE URINE PREGNANCY TEST: Status: RESOLVED | Noted: 2023-03-27 | Resolved: 2023-09-18

## 2023-09-18 PROBLEM — Z34.80 SUPERVISION OF OTHER NORMAL PREGNANCY, ANTEPARTUM: Status: RESOLVED | Noted: 2021-12-08 | Resolved: 2023-09-18

## 2023-09-18 PROBLEM — J45.909 ASTHMA AFFECTING PREGNANCY, ANTEPARTUM: Status: RESOLVED | Noted: 2021-12-10 | Resolved: 2023-09-18

## 2023-09-18 PROBLEM — Z34.90 PREGNANT: Status: RESOLVED | Noted: 2023-03-13 | Resolved: 2023-09-18

## 2023-09-18 PROBLEM — O24.410 GDM (GESTATIONAL DIABETES MELLITUS), CLASS A1: Status: RESOLVED | Noted: 2022-04-02 | Resolved: 2023-09-18

## 2023-09-18 PROBLEM — Z30.011 ENCOUNTER FOR INITIAL PRESCRIPTION OF CONTRACEPTIVE PILLS: Status: RESOLVED | Noted: 2023-04-14 | Resolved: 2023-09-18

## 2023-09-18 PROBLEM — O02.1 MISSED ABORTION: Status: RESOLVED | Noted: 2023-03-16 | Resolved: 2023-09-18

## 2023-09-18 PROBLEM — O24.419 GDM (GESTATIONAL DIABETES MELLITUS): Status: RESOLVED | Noted: 2022-05-26 | Resolved: 2023-09-18

## 2023-09-18 PROBLEM — Z34.90 PREGNANCY: Status: RESOLVED | Noted: 2022-06-22 | Resolved: 2023-09-18

## 2023-09-18 PROBLEM — N94.89 ADNEXAL MASS: Status: RESOLVED | Noted: 2023-03-27 | Resolved: 2023-09-18

## 2023-09-18 PROBLEM — O99.519 ASTHMA AFFECTING PREGNANCY, ANTEPARTUM: Status: RESOLVED | Noted: 2021-12-10 | Resolved: 2023-09-18
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
## 2023-09-19 LAB
ABO GROUP BLD: NORMAL
BASOPHILS # BLD AUTO: 0 X10E3/UL (ref 0–0.2)
BASOPHILS NFR BLD AUTO: 0 %
BILIRUB BLD-MCNC: NEGATIVE MG/DL
BLD GP AB SCN SERPL QL: NEGATIVE
CLARITY, POC: CLEAR
COLOR UR: YELLOW
EOSINOPHIL # BLD AUTO: 0.1 X10E3/UL (ref 0–0.4)
EOSINOPHIL NFR BLD AUTO: 1 %
ERYTHROCYTE [DISTWIDTH] IN BLOOD BY AUTOMATED COUNT: 13.2 % (ref 11.7–15.4)
GLUCOSE UR STRIP-MCNC: NEGATIVE MG/DL
HBA1C MFR BLD: 5.3 % (ref 4.8–5.6)
HBV SURFACE AG SERPL QL IA: NEGATIVE
HCT VFR BLD AUTO: 34.4 % (ref 34–46.6)
HCV IGG SERPL QL IA: NON REACTIVE
HGB A MFR BLD ELPH: 97.4 % (ref 96.4–98.8)
HGB A2 MFR BLD ELPH: 2.6 % (ref 1.8–3.2)
HGB BLD-MCNC: 11.5 G/DL (ref 11.1–15.9)
HGB F MFR BLD ELPH: 0 % (ref 0–2)
HGB FRACT BLD-IMP: NORMAL
HGB S MFR BLD ELPH: 0 %
HIV 1+2 AB+HIV1 P24 AG SERPL QL IA: NON REACTIVE
IMM GRANULOCYTES # BLD AUTO: 0 X10E3/UL (ref 0–0.1)
IMM GRANULOCYTES NFR BLD AUTO: 0 %
KETONES UR QL: NEGATIVE
LEUKOCYTE EST, POC: NEGATIVE
LYMPHOCYTES # BLD AUTO: 1.6 X10E3/UL (ref 0.7–3.1)
LYMPHOCYTES NFR BLD AUTO: 17 %
MCH RBC QN AUTO: 28.9 PG (ref 26.6–33)
MCHC RBC AUTO-ENTMCNC: 33.4 G/DL (ref 31.5–35.7)
MCV RBC AUTO: 86 FL (ref 79–97)
MONOCYTES # BLD AUTO: 0.4 X10E3/UL (ref 0.1–0.9)
MONOCYTES NFR BLD AUTO: 4 %
NEUTROPHILS # BLD AUTO: 7.8 X10E3/UL (ref 1.4–7)
NEUTROPHILS NFR BLD AUTO: 78 %
NITRITE UR-MCNC: NEGATIVE MG/ML
PH UR: 5 [PH] (ref 5–8)
PLATELET # BLD AUTO: 231 X10E3/UL (ref 150–450)
PROT UR STRIP-MCNC: NEGATIVE MG/DL
RBC # BLD AUTO: 3.98 X10E6/UL (ref 3.77–5.28)
RBC # UR STRIP: NEGATIVE /UL
RH BLD: POSITIVE
RPR SER QL: NON REACTIVE
RUBV IGG SERPL IA-ACNC: 1.14 INDEX
SP GR UR: 1 (ref 1–1.03)
UROBILINOGEN UR QL: NORMAL
VZV IGG SER IA-ACNC: 1802 INDEX
WBC # BLD AUTO: 9.9 X10E3/UL (ref 3.4–10.8)

## 2023-09-20 LAB
AMPHETAMINES UR QL SCN: NEGATIVE NG/ML
BACTERIA UR CULT: NORMAL
BACTERIA UR CULT: NORMAL
BARBITURATES UR QL SCN: NEGATIVE NG/ML
BENZODIAZ UR QL SCN: NEGATIVE NG/ML
BUPRENORPHINE UR QL: NEGATIVE NG/ML
BZE UR QL SCN: NEGATIVE NG/ML
C TRACH RRNA CVX QL NAA+PROBE: NEGATIVE
CANNABINOIDS UR QL SCN: NEGATIVE NG/ML
CONV .: NORMAL
CREAT UR-MCNC: 159.7 MG/DL (ref 20–300)
CYTOLOGIST CVX/VAG CYTO: NORMAL
CYTOLOGY CVX/VAG DOC CYTO: NORMAL
CYTOLOGY CVX/VAG DOC THIN PREP: NORMAL
DX ICD CODE: NORMAL
FENTANYL UR-MCNC: NEGATIVE PG/ML
HIV 1 & 2 AB SER-IMP: NORMAL
LABORATORY COMMENT REPORT: NORMAL
MEPERIDINE UR QL: NEGATIVE NG/ML
METHADONE UR QL SCN: NEGATIVE NG/ML
N GONORRHOEA RRNA CVX QL NAA+PROBE: NEGATIVE
OPIATES UR QL SCN: NEGATIVE NG/ML
OTHER STN SPEC: NORMAL
OXYCODONE+OXYMORPHONE UR QL SCN: NEGATIVE NG/ML
PCP UR QL: NEGATIVE NG/ML
PH UR: 5.8 [PH] (ref 4.5–8.9)
PROPOXYPH UR QL SCN: NEGATIVE NG/ML
STAT OF ADQ CVX/VAG CYTO-IMP: NORMAL
T VAGINALIS RRNA SPEC QL NAA+PROBE: NEGATIVE
TRAMADOL UR QL SCN: NEGATIVE NG/ML

## 2023-09-21 ENCOUNTER — TELEPHONE (OUTPATIENT)
Dept: OBSTETRICS AND GYNECOLOGY | Facility: CLINIC | Age: 28
End: 2023-09-21
Payer: COMMERCIAL

## 2023-09-21 RX ORDER — PROMETHAZINE HYDROCHLORIDE 12.5 MG/1
12.5 TABLET ORAL EVERY 6 HOURS PRN
Qty: 30 TABLET | Refills: 1 | Status: SHIPPED | OUTPATIENT
Start: 2023-09-21

## 2023-10-11 ENCOUNTER — HOSPITAL ENCOUNTER (EMERGENCY)
Facility: HOSPITAL | Age: 28
Discharge: HOME | End: 2023-10-11
Attending: EMERGENCY MEDICINE
Payer: COMMERCIAL

## 2023-10-11 VITALS
BODY MASS INDEX: 42.59 KG/M2 | HEART RATE: 71 BPM | RESPIRATION RATE: 18 BRPM | WEIGHT: 265 LBS | OXYGEN SATURATION: 100 % | TEMPERATURE: 98.8 F | DIASTOLIC BLOOD PRESSURE: 53 MMHG | HEIGHT: 66 IN | SYSTOLIC BLOOD PRESSURE: 113 MMHG

## 2023-10-11 DIAGNOSIS — R10.30 LOWER ABDOMINAL PAIN: Primary | ICD-10-CM

## 2023-10-11 LAB
BILIRUB UR QL STRIP: NEGATIVE
CLARITY UR: CLEAR
COLOR UR: ABNORMAL
GLUCOSE UR STRIP-MCNC: NEGATIVE MG/DL
HGB UR QL STRIP.AUTO: NEGATIVE
KETONES UR QL STRIP: ABNORMAL
LEUKOCYTE ESTERASE UR QL STRIP.AUTO: NEGATIVE
NITRITE UR QL STRIP: NEGATIVE
PH UR STRIP.AUTO: 7 [PH] (ref 4.5–8)
PROT UR QL STRIP: NEGATIVE
SP GR UR STRIP: 1.01 (ref 1–1.03)
UROBILINOGEN UR QL STRIP: ABNORMAL

## 2023-10-11 PROCEDURE — 99282 EMERGENCY DEPT VISIT SF MDM: CPT

## 2023-10-11 PROCEDURE — 81003 URINALYSIS AUTO W/O SCOPE: CPT | Performed by: EMERGENCY MEDICINE

## 2023-10-11 NOTE — ED TRIAGE NOTES
16 weeks pregnant, intermittent abd pain that started yesterday, denies bleeding or changes in urinating, pt has OB appt Monday

## 2023-10-11 NOTE — ED PROVIDER NOTES
Subjective   History of Present Illness  Patient presents complaining of episodic and intermittent lower abdominal pain that is in the center and going to both sides of her lower abdomen only in the front.  Patient is 16 weeks pregnant and has a OB appointment in 5 days.  Patient denies any fever, vomiting, diarrhea, constipation, dysuria, or vaginal bleeding/discharge.  Patient says nothing seems really to make it better and it only occurs occasionally but she went to make sure the baby was okay.  Patient has had an ultrasound around 10 weeks to confirm intrauterine pregnancy and was unremarkable for any concerning findings.  Patient does have a history of an ectopic in the past but ultrasound ruled this out.  No therapy taken prior to arrival.  Patient denies any recent travel or trauma.      Review of Systems   All other systems reviewed and are negative.      Past Medical History:   Diagnosis Date    Anemia     Anxiety     Asthma     Endometriosis     Uncomfirmed    Gestational diabetes     Migraine     Rapid heart beat        Allergies   Allergen Reactions    Latex Hives and Unknown - Low Severity       Past Surgical History:   Procedure Laterality Date    CHOLECYSTECTOMY      TONSILLECTOMY         Family History   Problem Relation Age of Onset    Breast cancer Maternal Grandmother     Colon cancer Maternal Grandmother     Breast cancer Maternal Grandfather     Lung cancer Maternal Grandfather        Social History     Socioeconomic History    Marital status:     Number of children: 2   Tobacco Use    Smoking status: Never    Smokeless tobacco: Never   Vaping Use    Vaping Use: Never used   Substance and Sexual Activity    Alcohol use: Never    Drug use: Never    Sexual activity: Yes     Partners: Male           Objective   Physical Exam  Vitals and nursing note reviewed.   Constitutional:       Appearance: She is well-developed.   HENT:      Head: Normocephalic.   Eyes:      Conjunctiva/sclera:  Conjunctivae normal.   Cardiovascular:      Rate and Rhythm: Normal rate and regular rhythm.      Heart sounds: Normal heart sounds.   Pulmonary:      Effort: Pulmonary effort is normal.      Breath sounds: Normal breath sounds.   Abdominal:      General: Abdomen is protuberant. There is no distension.      Palpations: Abdomen is soft.      Tenderness: There is no abdominal tenderness. There is no right CVA tenderness, left CVA tenderness or guarding.      Hernia: No hernia is present.      Comments: Active bowel sounds heard in all 4 quadrants.  Gravid uterus palpated just above the suprapubic region.  Nontender.   Skin:     General: Skin is warm and dry.      Capillary Refill: Capillary refill takes 2 to 3 seconds.   Neurological:      Mental Status: She is alert and oriented to person, place, and time.   Psychiatric:         Mood and Affect: Mood normal.         Behavior: Behavior normal.         Procedures           ED Course                                           Medical Decision Making  Ddx UTI, miscarriage, abdominal wall strain, round ligament pain    Labs Reviewed  URINALYSIS W/ MICROSCOPIC IF INDICATED (NO CULTURE) - Abnormal; Notable for the following components:     Ketones, UA                   Trace (*)            All other components within normal limits         Narrative: Urine microscopic not indicated.  URINALYSIS W/ MICROSCOPIC IF INDICATED (NO CULTURE)    1230 Pt seen again prior to d/c.  Labs reviewed and are unremarkable.  Fetal heart tones recorded in the 150's.  Vitals stable and pt. in NAD. Non-toxic. Comfortable. Ambulating without difficulty.  Tolerating po.  Relaxed breathing.  All questions personally answered at the bedside and all d/c instructions personally reviewed with pt.  Discussed the importance of close outpt. f/u and pt. understands this and agrees to do so.  Pt agrees to return to ED immediately for any new, persistent, or worsening symptoms.    EMR Dragon/Transcription  disclaimer:  Much of this encounter note is an electronic transcription/translation of spoken language to printed text, aka voice recognition.  The electronic translation of spoken language may permit erroneous or at times nonsensical words or phrases to be inadvertently transcribed; although I have reviewed the note for such errors, some may still exist so please interpret based on surrounding text content.          Final diagnoses:   Lower abdominal pain       ED Disposition  ED Disposition       ED Disposition   Discharge    Condition   Stable    Comment   --               Alena Cooper, APRN  [address] [id number]  [phone]    In 2 days  If symptoms worsen         Medication List      No changes were made to your prescriptions during this visit.            Sebastián Ramos MD  10/11/23 7528

## 2023-10-13 ENCOUNTER — HOSPITAL ENCOUNTER (EMERGENCY)
Facility: HOSPITAL | Age: 28
Discharge: HOME | End: 2023-10-13
Attending: EMERGENCY MEDICINE
Payer: COMMERCIAL

## 2023-10-13 VITALS
RESPIRATION RATE: 16 BRPM | TEMPERATURE: 97.6 F | BODY MASS INDEX: 42.59 KG/M2 | DIASTOLIC BLOOD PRESSURE: 75 MMHG | SYSTOLIC BLOOD PRESSURE: 128 MMHG | OXYGEN SATURATION: 97 % | HEART RATE: 88 BPM | HEIGHT: 66 IN | WEIGHT: 265 LBS

## 2023-10-13 DIAGNOSIS — S33.5XXA LUMBAR SPRAIN, INITIAL ENCOUNTER: ICD-10-CM

## 2023-10-13 DIAGNOSIS — Z3A.16 PREGNANCY WITH 16 COMPLETED WEEKS GESTATION: ICD-10-CM
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
## 2023-10-13 DIAGNOSIS — S60.221A CONTUSION OF RIGHT HAND, INITIAL ENCOUNTER: ICD-10-CM

## 2023-10-13 LAB
2ND TRIMESTER 4 SCREEN SERPL-IMP: NORMAL
ALBUMIN SERPL-MCNC: 4.1 G/DL (ref 3.5–5.2)
ALBUMIN/GLOB SERPL: 1.4 G/DL
ALP SERPL-CCNC: 99 U/L (ref 39–117)
ALT SERPL W P-5'-P-CCNC: 12 U/L (ref 1–33)
ANION GAP SERPL CALCULATED.3IONS-SCNC: 10.9 MMOL/L (ref 5–15)
AST SERPL-CCNC: 11 U/L (ref 1–32)
BACTERIA UR QL AUTO: ABNORMAL /HPF
BASOPHILS # BLD AUTO: 0.01 10*3/MM3 (ref 0–0.2)
BASOPHILS NFR BLD AUTO: 0.1 % (ref 0–1.5)
BILIRUB SERPL-MCNC: 0.3 MG/DL (ref 0–1.2)
BILIRUB UR QL STRIP: NEGATIVE
BUN SERPL-MCNC: 6 MG/DL (ref 6–20)
BUN/CREAT SERPL: 12.2 (ref 7–25)
CALCIUM SPEC-SCNC: 9.1 MG/DL (ref 8.6–10.5)
CHLORIDE SERPL-SCNC: 104 MMOL/L (ref 98–107)
CLARITY UR: CLEAR
CO2 SERPL-SCNC: 22.1 MMOL/L (ref 22–29)
COLOR UR: ABNORMAL
CREAT SERPL-MCNC: 0.49 MG/DL (ref 0.57–1)
DEPRECATED RDW RBC AUTO: 40.1 FL (ref 37–54)
EGFRCR SERPLBLD CKD-EPI 2021: 133.5 ML/MIN/1.73
EOSINOPHIL # BLD AUTO: 0.06 10*3/MM3 (ref 0–0.4)
EOSINOPHIL NFR BLD AUTO: 0.5 % (ref 0.3–6.2)
ERYTHROCYTE [DISTWIDTH] IN BLOOD BY AUTOMATED COUNT: 13.3 % (ref 12.3–15.4)
EXTERNAL CYSTIC FIBROSIS: NORMAL
EXTERNAL NIPT: NORMAL
GLOBULIN UR ELPH-MCNC: 2.9 GM/DL
GLUCOSE SERPL-MCNC: 77 MG/DL (ref 65–99)
GLUCOSE UR STRIP-MCNC: NEGATIVE MG/DL
HCT VFR BLD AUTO: 34.5 % (ref 34–46.6)
HGB BLD-MCNC: 11.9 G/DL (ref 12–15.9)
HGB UR QL STRIP.AUTO: NEGATIVE
HYALINE CASTS UR QL AUTO: ABNORMAL /LPF
IMM GRANULOCYTES # BLD AUTO: 0.05 10*3/MM3 (ref 0–0.05)
IMM GRANULOCYTES NFR BLD AUTO: 0.4 % (ref 0–0.5)
KETONES UR QL STRIP: ABNORMAL
LEUKOCYTE ESTERASE UR QL STRIP.AUTO: NEGATIVE
LYMPHOCYTES # BLD AUTO: 2.22 10*3/MM3 (ref 0.7–3.1)
LYMPHOCYTES NFR BLD AUTO: 19.4 % (ref 19.6–45.3)
MCH RBC QN AUTO: 29 PG (ref 26.6–33)
MCHC RBC AUTO-ENTMCNC: 34.5 G/DL (ref 31.5–35.7)
MCV RBC AUTO: 83.9 FL (ref 79–97)
MONOCYTES # BLD AUTO: 0.52 10*3/MM3 (ref 0.1–0.9)
MONOCYTES NFR BLD AUTO: 4.5 % (ref 5–12)
NEUTROPHILS NFR BLD AUTO: 75.1 % (ref 42.7–76)
NEUTROPHILS NFR BLD AUTO: 8.6 10*3/MM3 (ref 1.7–7)
NITRITE UR QL STRIP: NEGATIVE
NRBC BLD AUTO-RTO: 0 /100 WBC (ref 0–0.2)
PH UR STRIP.AUTO: 5.5 [PH] (ref 5–8)
PLATELET # BLD AUTO: 220 10*3/MM3 (ref 140–450)
PMV BLD AUTO: 10.3 FL (ref 6–12)
POTASSIUM SERPL-SCNC: 3.7 MMOL/L (ref 3.5–5.2)
PROT SERPL-MCNC: 7 G/DL (ref 6–8.5)
PROT UR QL STRIP: ABNORMAL
RBC # BLD AUTO: 4.11 10*6/MM3 (ref 3.77–5.28)
RBC # UR STRIP: ABNORMAL /HPF
REF LAB TEST METHOD: ABNORMAL
SODIUM SERPL-SCNC: 137 MMOL/L (ref 136–145)
SP GR UR STRIP: >=1.03 (ref 1–1.03)
SQUAMOUS #/AREA URNS HPF: ABNORMAL /HPF
UROBILINOGEN UR QL STRIP: ABNORMAL
WBC # UR STRIP: ABNORMAL /HPF
WBC NRBC COR # BLD: 11.46 10*3/MM3 (ref 3.4–10.8)

## 2023-10-13 PROCEDURE — 85025 COMPLETE CBC W/AUTO DIFF WBC: CPT | Performed by: PHYSICIAN ASSISTANT

## 2023-10-13 PROCEDURE — 93005 ELECTROCARDIOGRAM TRACING: CPT | Performed by: PHYSICIAN ASSISTANT

## 2023-10-13 PROCEDURE — 99283 EMERGENCY DEPT VISIT LOW MDM: CPT

## 2023-10-13 PROCEDURE — 80053 COMPREHEN METABOLIC PANEL: CPT | Performed by: PHYSICIAN ASSISTANT

## 2023-10-13 PROCEDURE — 81001 URINALYSIS AUTO W/SCOPE: CPT | Performed by: PHYSICIAN ASSISTANT

## 2023-10-13 RX ORDER — SODIUM CHLORIDE 0.9 % (FLUSH) 0.9 %
10 SYRINGE (ML) INJECTION AS NEEDED
Status: DISCONTINUED | OUTPATIENT
Start: 2023-10-13 | End: 2023-10-13 | Stop reason: HOSPADM
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
## 2023-10-15 LAB
QT INTERVAL: 416 MS
QTC INTERVAL: 446 MS

## 2023-10-16 ENCOUNTER — ROUTINE PRENATAL (OUTPATIENT)
Dept: OBSTETRICS AND GYNECOLOGY | Facility: CLINIC | Age: 28
End: 2023-10-16
Payer: COMMERCIAL

## 2023-10-16 VITALS — WEIGHT: 266 LBS | DIASTOLIC BLOOD PRESSURE: 80 MMHG | BODY MASS INDEX: 42.93 KG/M2 | SYSTOLIC BLOOD PRESSURE: 124 MMHG

## 2023-10-16 DIAGNOSIS — Z86.32 HISTORY OF GESTATIONAL DIABETES IN PRIOR PREGNANCY, CURRENTLY PREGNANT: ICD-10-CM

## 2023-10-16 DIAGNOSIS — O99.210 OBESITY IN PREGNANCY, ANTEPARTUM: ICD-10-CM

## 2023-10-16 DIAGNOSIS — D50.8 OTHER IRON DEFICIENCY ANEMIA: ICD-10-CM

## 2023-10-16 DIAGNOSIS — F32.0 CURRENT MILD EPISODE OF MAJOR DEPRESSIVE DISORDER, UNSPECIFIED WHETHER RECURRENT: ICD-10-CM

## 2023-10-16 DIAGNOSIS — Z36.9 ENCOUNTER FOR ANTENATAL SCREENING, UNSPECIFIED: Primary | ICD-10-CM

## 2023-10-16 DIAGNOSIS — J45.21 MILD INTERMITTENT ASTHMA WITH EXACERBATION: ICD-10-CM

## 2023-10-16 DIAGNOSIS — Z34.90 PREGNANCY, UNSPECIFIED GESTATIONAL AGE: ICD-10-CM

## 2023-10-16 DIAGNOSIS — O09.299 HISTORY OF GESTATIONAL DIABETES IN PRIOR PREGNANCY, CURRENTLY PREGNANT: ICD-10-CM

## 2023-10-16 PROBLEM — R20.0 LEFT LEG NUMBNESS: Status: RESOLVED | Noted: 2022-09-12 | Resolved: 2023-10-16

## 2023-10-16 PROBLEM — N39.3 STRESS INCONTINENCE OF URINE: Status: RESOLVED | Noted: 2018-05-07 | Resolved: 2023-10-16

## 2023-10-16 PROBLEM — D50.9 IRON DEFICIENCY ANEMIA: Status: ACTIVE | Noted: 2023-10-16

## 2023-10-16 PROBLEM — R55 POSTURAL DIZZINESS WITH PRESYNCOPE: Status: RESOLVED | Noted: 2022-05-26 | Resolved: 2023-10-16

## 2023-10-16 PROBLEM — R42 POSTURAL DIZZINESS WITH PRESYNCOPE: Status: RESOLVED | Noted: 2022-05-26 | Resolved: 2023-10-16

## 2023-10-16 PROBLEM — R53.83 OTHER FATIGUE: Status: RESOLVED | Noted: 2022-04-28 | Resolved: 2023-10-16

## 2023-10-16 PROBLEM — L20.9 ATOPIC DERMATITIS: Status: RESOLVED | Noted: 2021-05-28 | Resolved: 2023-10-16

## 2023-10-16 PROBLEM — R10.2 PELVIC PAIN: Status: RESOLVED | Noted: 2023-06-20 | Resolved: 2023-10-16

## 2023-10-16 LAB
BILIRUB BLD-MCNC: NEGATIVE MG/DL
CLARITY, POC: CLEAR
COLOR UR: YELLOW
GLUCOSE UR STRIP-MCNC: NEGATIVE MG/DL
KETONES UR QL: NEGATIVE
LEUKOCYTE EST, POC: NEGATIVE
NITRITE UR-MCNC: NEGATIVE MG/ML
PH UR: 5 [PH] (ref 5–8)
PROT UR STRIP-MCNC: NEGATIVE MG/DL
RBC # UR STRIP: NEGATIVE /UL
SP GR UR: 1 (ref 1–1.03)
UROBILINOGEN UR QL: NORMAL

## 2023-10-16 RX ORDER — BUSPIRONE HYDROCHLORIDE 10 MG/1
1 TABLET ORAL EVERY 12 HOURS SCHEDULED
COMMUNITY
Start: 2023-10-08

## 2023-10-16 RX ORDER — DOXYCYCLINE HYCLATE 50 MG/1
324 CAPSULE, GELATIN COATED ORAL
Qty: 30 TABLET | Refills: 6 | Status: SHIPPED | OUTPATIENT
Start: 2023-10-16

## 2023-10-16 NOTE — PROGRESS NOTES
OB follow up     Kylee Wells is a 28 y.o.  16w3d being seen today for her obstetrical visit.  Patient reports no complaints. Fetal movement:  off and on. She is on Buspar and Prozac and reports good symptom control. She is also on Pepcid 20 mg BID for GERD. She is undecided about contraception postpartum . She needs a refill of her iron and an inhaler.  She is doing her early 2 hour GTT today. This is the first time I am seeing this patient in this pregnancy and all of her problems are new to me, the examiner.       Review of Systems  No bleeding, No cramping/contractions     /80   Wt 121 kg (266 lb)   LMP 2023 (Exact Date) Comment: Due 2024  BMI 42.93 kg/m²     FHT: 152 BPM   Uterine Size: 16  cm       Assessment/Plan:    1) 28 y.o.  -pregnancy at 16w3d- pt declines NIPT/AFP and/or Quad screen.     2) Pt declines CF/SMA/FX screening    3) H/O GDMA1 in previous pregnancy- HgBa1c 5.3, doing early 2 hour GTT today.     4) Maternal BMI 42- will need serial growth US 28, 32, 36 and 39 weeks.     5) Anxiety- pt on Buspar and Prozac and reports good symptom control. She also has a counselor.    6) Anemia- HgB 11.9-0 start daily iron    7) GERD- on Pepcid 20 mg BID    8) Flu shot contraindicated due to Latex allergy    9) Asthma- inhaler refills called in    10) Reviewed this stage of pregnancy    11)Problem list updated     12) RTO 4 weeks OBT and US anatomy.     13)EPIC LOS calculator used to determine coding level.       Kiki Rubio MD    10/16/2023  09:00 EDT

## 2023-10-17 LAB
GLUCOSE 1H P 75 G GLC PO SERPL-MCNC: 152 MG/DL (ref 70–179)
GLUCOSE 2H P 75 G GLC PO SERPL-MCNC: 113 MG/DL (ref 70–152)
GLUCOSE P FAST SERPL-MCNC: 80 MG/DL (ref 70–91)
HCT VFR BLD AUTO: 33.5 % (ref 34–46.6)
HGB BLD-MCNC: 11.1 G/DL (ref 11.1–15.9)

## 2023-10-18 DIAGNOSIS — K21.9 GASTROESOPHAGEAL REFLUX DISEASE WITHOUT ESOPHAGITIS: ICD-10-CM

## 2023-10-18 RX ORDER — FAMOTIDINE 20 MG/1
20 TABLET, FILM COATED ORAL 2 TIMES DAILY
Qty: 60 TABLET | Refills: 3 | Status: SHIPPED | OUTPATIENT
Start: 2023-10-18

## 2023-11-13 ENCOUNTER — ROUTINE PRENATAL (OUTPATIENT)
Dept: OBSTETRICS AND GYNECOLOGY | Facility: CLINIC | Age: 28
End: 2023-11-13
Payer: COMMERCIAL

## 2023-11-13 VITALS — SYSTOLIC BLOOD PRESSURE: 128 MMHG | BODY MASS INDEX: 42.03 KG/M2 | DIASTOLIC BLOOD PRESSURE: 82 MMHG | WEIGHT: 260.4 LBS

## 2023-11-13 DIAGNOSIS — O09.299 HISTORY OF GESTATIONAL DIABETES IN PRIOR PREGNANCY, CURRENTLY PREGNANT: ICD-10-CM

## 2023-11-13 DIAGNOSIS — K21.9 GASTROESOPHAGEAL REFLUX DISEASE WITHOUT ESOPHAGITIS: ICD-10-CM

## 2023-11-13 DIAGNOSIS — O99.210 OBESITY IN PREGNANCY, ANTEPARTUM: ICD-10-CM

## 2023-11-13 DIAGNOSIS — Z36.9 ENCOUNTER FOR ANTENATAL SCREENING, UNSPECIFIED: ICD-10-CM

## 2023-11-13 DIAGNOSIS — J45.21 MILD INTERMITTENT ASTHMA WITH EXACERBATION: ICD-10-CM

## 2023-11-13 DIAGNOSIS — F33.0 MILD EPISODE OF RECURRENT MAJOR DEPRESSIVE DISORDER: ICD-10-CM

## 2023-11-13 DIAGNOSIS — Z34.92 PRENATAL CARE IN SECOND TRIMESTER: Primary | ICD-10-CM

## 2023-11-13 DIAGNOSIS — D50.8 OTHER IRON DEFICIENCY ANEMIA: ICD-10-CM

## 2023-11-13 DIAGNOSIS — R93.89 ABNORMAL ULTRASOUND: ICD-10-CM

## 2023-11-13 DIAGNOSIS — Z86.32 HISTORY OF GESTATIONAL DIABETES IN PRIOR PREGNANCY, CURRENTLY PREGNANT: ICD-10-CM

## 2023-11-13 NOTE — PROGRESS NOTES
OB follow up > 20 weeks    Kylee Wells is a 28 y.o.  20w3d being seen today for her obstetrical visit.  Patient reports fatigue and dizziness x 2 . Fetal movement: normal.   Taking PNV, iron daily, Pepcid BID, Buspar and Prozac . Anatomy scan done today.  Here with significant other.      Review of Systems  No bleeding, No cramping/contractions, no SROM    /82   Wt 118 kg (260 lb 6.4 oz)   LMP 2023 (Exact Date) Comment: Due 2024  BMI 42.03 kg/m²     FHT: 161 BPM   Uterine Size:        Assessment/Plan:    1) 28 y.o.  -pregnancy at 20w3d- US IMP: There is a hypoechoic area seen in the sacral region of the fetal spine.  This area gricelda 1.3x1.1cm.  Fetal growth appears small for dates.  The remaining fetal anatomy appears normal within limitations of US.  The sex appears female.  CL gricelda 4.1cm. FHR 161bpm     2) declines CF/SMA/FX screening; declines NIPT/AFP and/or Quad screen.     3) H/O GDMA1 in previous pregnancy- HgBa1c 5.3, passed early 2 hour GTT; needs repeat at 26wga     4) Maternal BMI 42- will need serial growth US 28, 32, 36 and 39 weeks.     5) Anxiety- pt on Buspar and Prozac and reports good symptom control. She also has a counselor.    6) Anemia- HgB 11.1- taking iron supplement daily; repeat CBC today    7) GERD- on Pepcid 20 mg BID    8) Flu shot contraindicated due to Latex allergy    9) Asthma- has inhaler if needed    10) dizziness- happened twice in the past month; first one occurred when she going from sitting to standing position; second occurrence she was standing at home; states she had eaten and stays well-hydrated; laid down with resolution of symptom; woke up with a headache; denies syncope, palpations or hx of heart problems    11) small for dates on US- possibly off on dates; referred to M    12) sacral abnormality- referred to [facility]      Reviewed this stage of pregnancy  Problem list updated   RTO 4 weeks OBT     I spent 25 minutes on this encounter,  before, during and after the visit, evaluating the patient, reviewing records and writing orders.  This time does not include time spent performing ultrasound.    Teresita Segura, APRN    11/13/2023  11:45 EST

## 2023-11-14 LAB
BASOPHILS # BLD AUTO: 0 X10E3/UL (ref 0–0.2)
BASOPHILS NFR BLD AUTO: 0 %
EOSINOPHIL # BLD AUTO: 0.1 X10E3/UL (ref 0–0.4)
EOSINOPHIL NFR BLD AUTO: 1 %
ERYTHROCYTE [DISTWIDTH] IN BLOOD BY AUTOMATED COUNT: 13.3 % (ref 11.7–15.4)
HCT VFR BLD AUTO: 32.9 % (ref 34–46.6)
HGB BLD-MCNC: 11 G/DL (ref 11.1–15.9)
IMM GRANULOCYTES # BLD AUTO: 0.1 X10E3/UL (ref 0–0.1)
IMM GRANULOCYTES NFR BLD AUTO: 1 %
LYMPHOCYTES # BLD AUTO: 1.6 X10E3/UL (ref 0.7–3.1)
LYMPHOCYTES NFR BLD AUTO: 14 %
MCH RBC QN AUTO: 28.5 PG (ref 26.6–33)
MCHC RBC AUTO-ENTMCNC: 33.4 G/DL (ref 31.5–35.7)
MCV RBC AUTO: 85 FL (ref 79–97)
MONOCYTES # BLD AUTO: 0.5 X10E3/UL (ref 0.1–0.9)
MONOCYTES NFR BLD AUTO: 4 %
NEUTROPHILS # BLD AUTO: 8.8 X10E3/UL (ref 1.4–7)
NEUTROPHILS NFR BLD AUTO: 80 %
PLATELET # BLD AUTO: 201 X10E3/UL (ref 150–450)
RBC # BLD AUTO: 3.86 X10E6/UL (ref 3.77–5.28)
WBC # BLD AUTO: 11 X10E3/UL (ref 3.4–10.8)

## 2023-11-14 RX ORDER — FERROUS GLUCONATE 324(38)MG
324 TABLET ORAL 2 TIMES DAILY WITH MEALS
Qty: 60 TABLET | Refills: 6 | Status: SHIPPED | OUTPATIENT
Start: 2023-11-14

## 2023-11-15 ENCOUNTER — TRANSCRIBE ORDERS (OUTPATIENT)
Dept: ULTRASOUND IMAGING | Facility: HOSPITAL | Age: 28
End: 2023-11-15
Payer: COMMERCIAL

## 2023-11-15 DIAGNOSIS — R93.89 ABNORMAL ULTRASOUND: Primary | ICD-10-CM

## 2023-11-16 ENCOUNTER — OFFICE VISIT (OUTPATIENT)
Dept: OBSTETRICS AND GYNECOLOGY | Facility: CLINIC | Age: 28
End: 2023-11-16
Payer: COMMERCIAL

## 2023-11-16 ENCOUNTER — HOSPITAL ENCOUNTER (OUTPATIENT)
Dept: ULTRASOUND IMAGING | Facility: HOSPITAL | Age: 28
Discharge: HOME | End: 2023-11-16
Admitting: OBSTETRICS & GYNECOLOGY
Payer: COMMERCIAL

## 2023-11-16 VITALS
TEMPERATURE: 98.2 F | HEART RATE: 83 BPM | SYSTOLIC BLOOD PRESSURE: 121 MMHG | HEIGHT: 66 IN | WEIGHT: 260.6 LBS | BODY MASS INDEX: 41.88 KG/M2 | DIASTOLIC BLOOD PRESSURE: 57 MMHG

## 2023-11-16 DIAGNOSIS — O35.9XX0 SUSPECTED FETAL ANOMALY, ANTEPARTUM, SINGLE OR UNSPECIFIED FETUS: ICD-10-CM

## 2023-11-16 DIAGNOSIS — O99.210 OBESITY IN PREGNANCY, ANTEPARTUM: Primary | ICD-10-CM

## 2023-11-16 DIAGNOSIS — R93.89 ABNORMAL ULTRASOUND: ICD-10-CM

## 2023-11-16 DIAGNOSIS — Z03.74 FETAL GROWTH PROBLEM SUSPECTED BUT NOT FOUND: ICD-10-CM

## 2023-11-16 DIAGNOSIS — Z36.2 ENCOUNTER FOR FOLLOW-UP ULTRASOUND OF FETAL ANATOMY: ICD-10-CM

## 2023-11-16 PROCEDURE — 76811 OB US DETAILED SNGL FETUS: CPT

## 2023-11-16 NOTE — PROGRESS NOTES
Pt reports that she is doing well and denies vaginal bleeding, cramping, contractions or LOF at this time. Reports active fetal movement. Reviewed when to call OB office or present to L&D for evaluation with symptoms such as decreased fetal movement, vaginal bleeding, LOF or ctxs. Pt verbalized understanding. Denies HA, visual changes or epigastric pain. Denies any additional complaints at time of appointment. Next OB appointment scheduled for 12/11.    Vitals:    11/16/23 1446   BP: 121/57   Pulse: 83   Temp: 98.2 °F (36.8 °C)

## 2023-11-16 NOTE — LETTER
2023     ARNOL Rodriguez  [address]  [facility] [address]  [facility] [address]    Patient: Kylee Wells   YOB: 1995   Date of Visit: 2023       Dear ARNOL Rodriguez,    Thank you for referring Kylee Wells to me for evaluation. Below is a copy of my consult note.    If you have questions, please do not hesitate to call me. I look forward to following Kylee along with you.         Sincerely,        Alysa Phillips MD        MATERNAL FETAL MEDICINE Consult Note    Dear Dr Teresita Segura, APRN:    Thank you for your kind referral of Kylee Wells.  As you know, she is a 28 y.o.   at  19 3/7 weeks gestation (Estimated Date of Delivery: 24). This is a consult.      Her antepartum course is complicated by:  FGR, not found  Sacral lesion/spina bifida (fetal) not found    Aneuploidy Screening: declined preivously    HPI: Today, she denies headache, blurry vision, RUQ pain. No vaginal bleeding, no contractions.     Review of History:  Past Medical History:   Diagnosis Date   • Anemia    • Anxiety    • Asthma    • Endometriosis     Uncomfirmed   • Gestational diabetes    • Migraine    • Rapid heart beat      Past Surgical History:   Procedure Laterality Date   • CHOLECYSTECTOMY     • TONSILLECTOMY         Social History     Socioeconomic History   • Marital status:    • Number of children: 2   Tobacco Use   • Smoking status: Never   • Smokeless tobacco: Never   Vaping Use   • Vaping Use: Never used   Substance and Sexual Activity   • Alcohol use: Never   • Drug use: Never   • Sexual activity: Yes     Partners: Male     Family History   Problem Relation Age of Onset   • Breast cancer Maternal Grandmother    • Colon cancer Maternal Grandmother    • Breast cancer Maternal Grandfather    • Lung cancer Maternal Grandfather       Allergies   Allergen Reactions   • Latex Hives and Unknown - Low Severity      Current Outpatient Medications on File Prior  to Visit   Medication Sig Dispense Refill   • albuterol sulfate  (90 Base) MCG/ACT inhaler albuterol sulfate HFA 90 mcg/actuation aerosol inhaler     • busPIRone (BUSPAR) 10 MG tablet Take 1 tablet by mouth Every 12 (Twelve) Hours.     • cetirizine (zyrTEC) 10 MG tablet Daily.     • doxylamine (UNISOM) 25 MG tablet Take 1 tablet by mouth At Night As Needed for Sleep. 60 tablet 2   • famotidine (PEPCID) 20 MG tablet TAKE 1 TABLET BY MOUTH TWICE DAILY 60 tablet 3   • ferrous gluconate (FERGON) 324 MG tablet Take 1 tablet by mouth 2 (Two) Times a Day With Meals. 60 tablet 6   • FLUoxetine (PROzac) 40 MG capsule Take 1 capsule by mouth Every Morning.     • Prenatal Vit-Fe Fumarate-FA (prenatal vitamin 28-0.8) 28-0.8 MG tablet tablet Take 1 tablet by mouth Daily. 30 tablet 11   • fluticasone (FLONASE) 50 MCG/ACT nasal spray 2 sprays into the nostril(s) as directed by provider Daily. (Patient not taking: Reported on 10/16/2023) 16 g 0   • ipratropium (ATROVENT) 0.06 % nasal spray 2 sprays into the nostril(s) as directed by provider 4 (Four) Times a Day. (Patient not taking: Reported on 10/16/2023) 1 each 0   • ondansetron (ZOFRAN) 8 MG tablet Take 1 tablet by mouth Every 8 (Eight) Hours As Needed. (Patient not taking: Reported on 11/16/2023)     • promethazine (PHENERGAN) 12.5 MG tablet Take 1 tablet by mouth Every 6 (Six) Hours As Needed for Nausea or Vomiting. (Patient not taking: Reported on 11/16/2023) 30 tablet 1   • vitamin B-6 (PYRIDOXINE) 25 MG tablet Take 1 tablet by mouth Every Night. (Patient not taking: Reported on 11/16/2023) 60 tablet 1     No current facility-administered medications on file prior to visit.        Past obstetric, gynecological, medical, surgical, family and social history reviewed.  Relevant lab work and imaging reviewed.    Review of systems  Constitutional:  denies fever, chills, malaise.   ENT/Mouth:  denies sore throat, tinnitus  Eyes: denies vision changes/pain  CV:  denies  "chest pain  Respiratory:  denies cough/SOB  GI:  denies N/V, diarrhea, abdominal pain.    :   denies dysuria  Skin:  denies lesions or pruritus   Neuro:  denies weakness, focal neurologic symptoms    Vitals:    23 1446   BP: 121/57   BP Location: Right arm   Patient Position: Sitting   Pulse: 83   Temp: 98.2 °F (36.8 °C)   TempSrc: Temporal   Weight: 118 kg (260 lb 9.6 oz)   Height: 167.6 cm (66\")       PHYSICAL EXAM   GENERAL: Not in acute distress, AAOx3, pleasant  CARDIO: regular rate and rhythm  PULM: symmetric chest rise, speaking in complete sentences without difficulty  NEURO: awake, alert and oriented to person, place, and time  ABDOMINAL: No fundal tenderness, no rebound or guarding, gravid  EXTREMITIES: no bilateral lower extremity edema/tenderness  SKIN: Warm, well-perfused      ULTRASOUND   Please view full ultrasound note on Imaging tab in ViewPoint.  Cephalic presentation.  Anterior placenta.  MVP 4.7 cm, which is normal.   g (AC 25%)  CL > 3.5 cm transabdominally, which is normal.  Anatomy including spine appears normal.    ASSESSMENT/COUNSELIN y.o.   at  19 3/7 weeks gestation (Estimated Date of Delivery: 24).     -Pregnancy  [ X ] stable  [   ] improving [  ] worsening    Diagnoses and all orders for this visit:    1. Obesity in pregnancy, antepartum (Primary)  Overview:  NSTs at 36 weeks  Delivery 39 to 40 weeks      2. Encounter for follow-up ultrasound of fetal anatomy    3. Suspected fetal anomaly, antepartum, single or unspecified fetus    4. Fetal growth problem suspected but not found         FGR not found:  Abnormal anatomy--not found  Confirmed dates--2024 is due date based on 10ish week US.  She did not have periods that were always regular she said, so would use this date--changed in computer and on US.      Anatomy appears normal--discussed limitations of US but congratulated patient.      Summary of Plan  -Serial growth ultrasounds every 4 weeks (by " primary OB) for BMI 42  -Routine prenatal care.      Follow-up: No follow up with MFM scheduled, but I am happy to see for follow up at request of primary obstetrician    Thank you for the consult and opportunity to care for this patient.  Please feel free to reach out with any questions or concerns.      I spent 15 minutes caring for this patient on this date of service. This time includes time spent by me in the following activities: preparing for the visit, reviewing tests, obtaining and/or reviewing a separately obtained history, performing a medically appropriate examination and/or evaluation, counseling and educating the patient/family/caregiver and independently interpreting results and communicating that information with the patient/family/caregiver with greater than 50% spent in counseling and coordination of care.       I spent 3 minutes on the separately reported service of US imaging not included in the time used to support the E/M service also reported today.      Alysa Phillips MD FACOG  Maternal Fetal Medicine-[facility]  Office: [phone]  [email or web address]

## 2023-11-19 NOTE — PROGRESS NOTES
MATERNAL FETAL MEDICINE Consult Note    Dear Dr Teresita Segura, APRN:    Thank you for your kind referral of Kylee Wells.  As you know, she is a 28 y.o.   at  19 3/7 weeks gestation (Estimated Date of Delivery: 24). This is a consult.      Her antepartum course is complicated by:  FGR, not found  Sacral lesion/spina bifida (fetal) not found    Aneuploidy Screening: declined preivously    HPI: Today, she denies headache, blurry vision, RUQ pain. No vaginal bleeding, no contractions.     Review of History:  Past Medical History:   Diagnosis Date    Anemia     Anxiety     Asthma     Endometriosis     Uncomfirmed    Gestational diabetes     Migraine     Rapid heart beat      Past Surgical History:   Procedure Laterality Date    CHOLECYSTECTOMY      TONSILLECTOMY         Social History     Socioeconomic History    Marital status:     Number of children: 2   Tobacco Use    Smoking status: Never    Smokeless tobacco: Never   Vaping Use    Vaping Use: Never used   Substance and Sexual Activity    Alcohol use: Never    Drug use: Never    Sexual activity: Yes     Partners: Male     Family History   Problem Relation Age of Onset    Breast cancer Maternal Grandmother     Colon cancer Maternal Grandmother     Breast cancer Maternal Grandfather     Lung cancer Maternal Grandfather       Allergies   Allergen Reactions    Latex Hives and Unknown - Low Severity      Current Outpatient Medications on File Prior to Visit   Medication Sig Dispense Refill    albuterol sulfate  (90 Base) MCG/ACT inhaler albuterol sulfate HFA 90 mcg/actuation aerosol inhaler      busPIRone (BUSPAR) 10 MG tablet Take 1 tablet by mouth Every 12 (Twelve) Hours.      cetirizine (zyrTEC) 10 MG tablet Daily.      doxylamine (UNISOM) 25 MG tablet Take 1 tablet by mouth At Night As Needed for Sleep. 60 tablet 2    famotidine (PEPCID) 20 MG tablet TAKE 1 TABLET BY MOUTH TWICE DAILY 60 tablet 3    ferrous gluconate (FERGON) 324  "MG tablet Take 1 tablet by mouth 2 (Two) Times a Day With Meals. 60 tablet 6    FLUoxetine (PROzac) 40 MG capsule Take 1 capsule by mouth Every Morning.      Prenatal Vit-Fe Fumarate-FA (prenatal vitamin 28-0.8) 28-0.8 MG tablet tablet Take 1 tablet by mouth Daily. 30 tablet 11    fluticasone (FLONASE) 50 MCG/ACT nasal spray 2 sprays into the nostril(s) as directed by provider Daily. (Patient not taking: Reported on 10/16/2023) 16 g 0    ipratropium (ATROVENT) 0.06 % nasal spray 2 sprays into the nostril(s) as directed by provider 4 (Four) Times a Day. (Patient not taking: Reported on 10/16/2023) 1 each 0    ondansetron (ZOFRAN) 8 MG tablet Take 1 tablet by mouth Every 8 (Eight) Hours As Needed. (Patient not taking: Reported on 11/16/2023)      promethazine (PHENERGAN) 12.5 MG tablet Take 1 tablet by mouth Every 6 (Six) Hours As Needed for Nausea or Vomiting. (Patient not taking: Reported on 11/16/2023) 30 tablet 1    vitamin B-6 (PYRIDOXINE) 25 MG tablet Take 1 tablet by mouth Every Night. (Patient not taking: Reported on 11/16/2023) 60 tablet 1     No current facility-administered medications on file prior to visit.        Past obstetric, gynecological, medical, surgical, family and social history reviewed.  Relevant lab work and imaging reviewed.    Review of systems  Constitutional:  denies fever, chills, malaise.   ENT/Mouth:  denies sore throat, tinnitus  Eyes: denies vision changes/pain  CV:  denies chest pain  Respiratory:  denies cough/SOB  GI:  denies N/V, diarrhea, abdominal pain.    :   denies dysuria  Skin:  denies lesions or pruritus   Neuro:  denies weakness, focal neurologic symptoms    Vitals:    11/16/23 1446   BP: 121/57   BP Location: Right arm   Patient Position: Sitting   Pulse: 83   Temp: 98.2 °F (36.8 °C)   TempSrc: Temporal   Weight: 118 kg (260 lb 9.6 oz)   Height: 167.6 cm (66\")       PHYSICAL EXAM   GENERAL: Not in acute distress, AAOx3, pleasant  CARDIO: regular rate and rhythm  PULM: " symmetric chest rise, speaking in complete sentences without difficulty  NEURO: awake, alert and oriented to person, place, and time  ABDOMINAL: No fundal tenderness, no rebound or guarding, gravid  EXTREMITIES: no bilateral lower extremity edema/tenderness  SKIN: Warm, well-perfused      ULTRASOUND   Please view full ultrasound note on Imaging tab in ViewPoint.  Cephalic presentation.  Anterior placenta.  MVP 4.7 cm, which is normal.   g (AC 25%)  CL > 3.5 cm transabdominally, which is normal.  Anatomy including spine appears normal.    ASSESSMENT/COUNSELIN y.o.   at  19 3/7 weeks gestation (Estimated Date of Delivery: 24).     -Pregnancy  [ X ] stable  [   ] improving [  ] worsening    Diagnoses and all orders for this visit:    1. Obesity in pregnancy, antepartum (Primary)  Overview:  NSTs at 36 weeks  Delivery 39 to 40 weeks      2. Encounter for follow-up ultrasound of fetal anatomy    3. Suspected fetal anomaly, antepartum, single or unspecified fetus    4. Fetal growth problem suspected but not found         FGR not found:  Abnormal anatomy--not found  Confirmed dates--2024 is due date based on 10ish week US.  She did not have periods that were always regular she said, so would use this date--changed in computer and on US.      Anatomy appears normal--discussed limitations of US but congratulated patient.      Summary of Plan  -Serial growth ultrasounds every 4 weeks (by primary OB) for BMI 42  -Routine prenatal care.      Follow-up: No follow up with MFM scheduled, but I am happy to see for follow up at request of primary obstetrician    Thank you for the consult and opportunity to care for this patient.  Please feel free to reach out with any questions or concerns.      I spent 15 minutes caring for this patient on this date of service. This time includes time spent by me in the following activities: preparing for the visit, reviewing tests, obtaining and/or reviewing a  separately obtained history, performing a medically appropriate examination and/or evaluation, counseling and educating the patient/family/caregiver and independently interpreting results and communicating that information with the patient/family/caregiver with greater than 50% spent in counseling and coordination of care.       I spent 3 minutes on the separately reported service of US imaging not included in the time used to support the E/M service also reported today.      Alysa Phillips MD [facility] [place]  Maternal Fetal Medicine-[facility]  Office: [phone]  [email or web address]

## 2023-12-11 ENCOUNTER — ROUTINE PRENATAL (OUTPATIENT)
Dept: OBSTETRICS AND GYNECOLOGY | Facility: CLINIC | Age: 28
End: 2023-12-11
Payer: COMMERCIAL

## 2023-12-11 VITALS — WEIGHT: 262 LBS | DIASTOLIC BLOOD PRESSURE: 70 MMHG | BODY MASS INDEX: 42.29 KG/M2 | SYSTOLIC BLOOD PRESSURE: 124 MMHG

## 2023-12-11 DIAGNOSIS — D50.8 OTHER IRON DEFICIENCY ANEMIA: ICD-10-CM

## 2023-12-11 DIAGNOSIS — R93.89 ABNORMAL ULTRASOUND: ICD-10-CM

## 2023-12-11 DIAGNOSIS — Z30.09 CONTRACEPTIVE EDUCATION: ICD-10-CM

## 2023-12-11 DIAGNOSIS — Z86.32 HISTORY OF GESTATIONAL DIABETES IN PRIOR PREGNANCY, CURRENTLY PREGNANT: ICD-10-CM

## 2023-12-11 DIAGNOSIS — J45.20 MILD INTERMITTENT ASTHMA WITHOUT COMPLICATION: ICD-10-CM

## 2023-12-11 DIAGNOSIS — F32.0 CURRENT MILD EPISODE OF MAJOR DEPRESSIVE DISORDER, UNSPECIFIED WHETHER RECURRENT: ICD-10-CM

## 2023-12-11 DIAGNOSIS — O09.299 HISTORY OF GESTATIONAL DIABETES IN PRIOR PREGNANCY, CURRENTLY PREGNANT: ICD-10-CM

## 2023-12-11 DIAGNOSIS — Z34.90 PREGNANCY, UNSPECIFIED GESTATIONAL AGE: ICD-10-CM

## 2023-12-11 DIAGNOSIS — K21.9 GASTROESOPHAGEAL REFLUX DISEASE WITHOUT ESOPHAGITIS: ICD-10-CM

## 2023-12-11 DIAGNOSIS — Z36.9 ENCOUNTER FOR ANTENATAL SCREENING, UNSPECIFIED: Primary | ICD-10-CM

## 2023-12-11 DIAGNOSIS — O99.210 OBESITY IN PREGNANCY, ANTEPARTUM: ICD-10-CM

## 2023-12-11 NOTE — PROGRESS NOTES
OB follow up     Kylee Wells is a 28 y.o.  23w0d being seen today for her obstetrical visit.  Patient reports no complaints. Fetal movement: normal. She is undecided about contraception and we discussed an IUD and info was given. She is doing well on her Prozac, Buspar and Pepcid.  She is on iron BID.    Review of Systems  No bleeding, No cramping/contractions     /70   Wt 119 kg (262 lb)   LMP 2023 (Exact Date) Comment: Due 2024  BMI 42.29 kg/m²     FHT: 155 BPM   Uterine Size: 25  cm       Assessment/Plan:    1) 28 y.o.  -pregnancy at 23w0d- doing well.     2) ? SGA and sacral dimple- pt has seen MFM and had normal growth and normal spinal anatomy. LUAN 2024 reconfirmed.     3)Pt declined NIPT/AFP and/or Quad screen.      4) Pt declined CF/SMA/FX screening     5) H/O GDMA1 in previous pregnancy- HgBa1c 5.3, s/p early 2 hour GTT that was normal. Repeat 2 hour GTT in 3 weeks.      6) Maternal BMI 42- will need serial growth US 28, 32, 36 and 39 weeks.   MFM recs:   NST weekly at 36 weeks  Growth q 4 weeks  Del 39-40 weeks     7) Anxiety- pt on Buspar and Prozac and reports good symptom control. She also has a counselor.     8) Anemia- HgB 11.0 ( 2023), on BID iron.      9) GERD- on Pepcid 20 mg BID     10) Flu shot contraindicated due to Latex allergy     11) Asthma- inhaler at home, rarely needs.     12) CS-pt plasn on breastfeeding.  Progesterone only methods of birth control include the progesterone only birth control pills, the Depo-Provera shot, Nexplanon arm implant, hormonal IUD such as Renee, Mirena Kyleena and Liletta as well as nonhormonal IUD such as the copper T or ParaGard. Info on Mirena given.    13) Reviewed this stage of pregnancy     14)Problem list updated      15) RTO 3 weeks OBT, 2 hour GTT, RH + , Tdap and US growth.           Kiki Rubio MD    2023  12:13 EST

## 2024-01-02 ENCOUNTER — ROUTINE PRENATAL (OUTPATIENT)
Dept: OBSTETRICS AND GYNECOLOGY | Facility: CLINIC | Age: 29
End: 2024-01-02
Payer: COMMERCIAL

## 2024-01-02 VITALS — SYSTOLIC BLOOD PRESSURE: 122 MMHG | DIASTOLIC BLOOD PRESSURE: 84 MMHG | BODY MASS INDEX: 42.77 KG/M2 | WEIGHT: 265 LBS

## 2024-01-02 DIAGNOSIS — Z3A.26 26 WEEKS GESTATION OF PREGNANCY: ICD-10-CM

## 2024-01-02 DIAGNOSIS — M54.32 BILATERAL SCIATICA: ICD-10-CM

## 2024-01-02 DIAGNOSIS — Z86.32 HISTORY OF GESTATIONAL DIABETES IN PRIOR PREGNANCY, CURRENTLY PREGNANT: ICD-10-CM

## 2024-01-02 DIAGNOSIS — M54.31 BILATERAL SCIATICA: ICD-10-CM

## 2024-01-02 DIAGNOSIS — O09.299 HISTORY OF GESTATIONAL DIABETES IN PRIOR PREGNANCY, CURRENTLY PREGNANT: ICD-10-CM

## 2024-01-02 DIAGNOSIS — Z36.9 ENCOUNTER FOR ANTENATAL SCREENING, UNSPECIFIED: Primary | ICD-10-CM

## 2024-01-02 PROCEDURE — 99213 OFFICE O/P EST LOW 20 MIN: CPT | Performed by: OBSTETRICS & GYNECOLOGY

## 2024-01-02 NOTE — PROGRESS NOTES
S:  cc: Pt here for ob office visit.  hpi: Kylee Wells is a 28 y.o.  26w1d being seen today for her obstetrical visit.   Patient reports no complaints .   Fetal movement: normal. .      Social History     Social History Narrative    Not on file      SMOKER? No      O:  /84   Wt 120 kg (265 lb)   LMP 2023 (Exact Date) Comment: Due 2024  BMI 42.77 kg/m²     Prenatal Assessment  Fetal Heart Rate: present  Movement: Present  Prenatal Vitals  BP: 122/84  Weight: 120 kg (265 lb)  Urine Glucose/Protein  Urine Glucose Read-only: Negative  Urine Protein Read-only: Negative  Vaginal Drainage  Draining Fluid: No  Edema  LLE Edema: None  RLE Edema: None  Facial Edema: None    Brief Urine Lab Results  (Last result in the past 365 days)        Color   Clarity   Blood   Leuk Est   Nitrite   Protein   CREAT   Urine HCG        24 1131 Yellow   Clear   Negative   Negative   Negative   Negative                   A:    DIAGNOSES:  28 y.o.  26w1d  Diagnoses and all orders for this visit:    1. Encounter for  screening, unspecified (Primary)  -     POC Urinalysis Dipstick    2. Bilateral sciatica  -     Ambulatory Referral to Physical Therapy    3. 26 weeks gestation of pregnancy    4. History of gestational diabetes in prior pregnancy, currently pregnant      NEW PROBLEMS? none    P:  Return in about 2 weeks (around 2024) for ob tummy, GTT/HH, Tdap.    Pt instructed to call for results of any testing done today and that failure to call if she has not heard from us could result in inadequate care and treament.  Pt verbalized her understanding.   Time Spent: I spent 20+ minutes caring for Kylee on this date of service. This time includes time spent by me in the following activities: preparing for the visit, reviewing tests, obtaining and/or reviewing a separately obtained history, performing a medically appropriate examination and/or evaluation, counseling and educating the  patient/family/caregiver, ordering medications, tests, or procedures, referring and communicating with other health care professionals, documenting information in the medical record, independently interpreting results and communicating that information with the patient/family/caregiver, and care coordination.      Chun Kennedy MD  06:48 EST   01/08/24

## 2024-01-10 ENCOUNTER — REFERRAL TRIAGE (OUTPATIENT)
Dept: LABOR AND DELIVERY | Facility: HOSPITAL | Age: 29
End: 2024-01-10
Payer: COMMERCIAL

## 2024-01-11 ENCOUNTER — TELEPHONE (OUTPATIENT)
Dept: OBSTETRICS AND GYNECOLOGY | Facility: CLINIC | Age: 29
End: 2024-01-11

## 2024-01-11 ENCOUNTER — ROUTINE PRENATAL (OUTPATIENT)
Dept: OBSTETRICS AND GYNECOLOGY | Facility: CLINIC | Age: 29
End: 2024-01-11
Payer: COMMERCIAL

## 2024-01-11 VITALS — DIASTOLIC BLOOD PRESSURE: 76 MMHG | SYSTOLIC BLOOD PRESSURE: 120 MMHG | BODY MASS INDEX: 43.09 KG/M2 | WEIGHT: 267 LBS

## 2024-01-11 DIAGNOSIS — Z34.92 PRENATAL CARE IN SECOND TRIMESTER: Primary | ICD-10-CM

## 2024-01-11 DIAGNOSIS — Z36.9 ENCOUNTER FOR ANTENATAL SCREENING, UNSPECIFIED: ICD-10-CM

## 2024-01-11 DIAGNOSIS — R39.9 UTI SYMPTOMS: ICD-10-CM

## 2024-01-11 DIAGNOSIS — R10.2 FEELING PELVIC PRESSURE DURING PREGNANCY, ANTEPARTUM: ICD-10-CM

## 2024-01-11 DIAGNOSIS — O26.899 FEELING PELVIC PRESSURE DURING PREGNANCY, ANTEPARTUM: ICD-10-CM

## 2024-01-11 LAB
BILIRUB BLD-MCNC: NEGATIVE MG/DL
CLARITY, POC: CLEAR
COLOR UR: YELLOW
GLUCOSE UR STRIP-MCNC: NEGATIVE MG/DL
KETONES UR QL: NEGATIVE
LEUKOCYTE EST, POC: NEGATIVE
NITRITE UR-MCNC: NEGATIVE MG/ML
PH UR: 5 [PH] (ref 5–8)
PROT UR STRIP-MCNC: NEGATIVE MG/DL
RBC # UR STRIP: NEGATIVE /UL
SP GR UR: 1.02 (ref 1–1.03)
UROBILINOGEN UR QL: NORMAL

## 2024-01-11 RX ORDER — NITROFURANTOIN 25; 75 MG/1; MG/1
100 CAPSULE ORAL 2 TIMES DAILY
Qty: 14 CAPSULE | Refills: 0 | Status: SHIPPED | OUTPATIENT
Start: 2024-01-11 | End: 2024-01-18

## 2024-01-11 NOTE — PROGRESS NOTES
"OB follow up     Kylee Wells is a 28 y.o.  27w3d being seen today for her obstetrical visit.  She is a work in today for urinary c/o. Reports pelvic pressure and feels like she is not emptying her bladder completely.  She has discomfort with urination. She feels like her urine is decreased. Yesterday she felt cramping and pelvic pressure. She states, \"I dont know how much longer I can work.\"       Review of Systems  No bleeding, No cramping/contractions     /76   Wt 121 kg (267 lb)   LMP 2023 (Exact Date) Comment: Due 2024  BMI 43.09 kg/m²     FHT: 140s BPM   Uterine Size: 34  cm       Assessment/Plan:    1) 28 y.o.  -pregnancy at 27w3d- feels poor today. US IMP: VTX>  bpm. Max fluid pocket 4.5cm. CL 3.8cm No funneling or breaking noted.     2) ? SGA and sacral dimple- pt has seen MFM and had normal growth and normal spinal anatomy. LUAN 2024 reconfirmed.     3)Pt declined NIPT/AFP and/or Quad screen.      4) Pt declined CF/SMA/FX screening     5) H/O GDMA1 in previous pregnancy- HgBa1c 5.3, s/p early 2 hour GTT that was normal. Repeat 2 hour GTT in 3 weeks.      6) Maternal BMI 42- will need serial growth US 28, 32, 36 and 39 weeks.   MFM recs:   NST weekly at 36 weeks  Growth q 4 weeks  Del 39-40 weeks     7) Anxiety- pt on Buspar and Prozac and reports good symptom control. She also has a counselor.     8) Anemia- HgB 11.0 ( 2023), on BID iron.      9) GERD- on Pepcid 20 mg BID     10) Flu shot contraindicated due to Latex allergy     11) Asthma- inhaler at home, rarely needs.     12) CS-pt plans on breastfeeding.  Progesterone only methods of birth control include the progesterone only birth control pills, the Depo-Provera shot, Nexplanon arm implant, hormonal IUD such as Renee, Mirena Kyleena and Liletta as well as nonhormonal IUD such as the copper T or ParaGard. Info on Mirena given.    13) Urinary c/o- UA normal today. Check urine cx and NuSwab.     14) " Cramping and pelvic pressure- CL 3.8cm. Enc pregnancy support belt. Has PT appt 1/24/24.  REv katie s/s.     15) S>D- has growth scan scheduled for Monday with appt     Reviewed this stage of pregnancy  Problem list updated   Keep scheduled appt     Jinny Meade, APRN  1/11/2024  10:53 EST

## 2024-01-12 ENCOUNTER — HOSPITAL ENCOUNTER (OUTPATIENT)
Facility: HOSPITAL | Age: 29
Discharge: HOME | End: 2024-01-12
Attending: OBSTETRICS & GYNECOLOGY | Admitting: OBSTETRICS & GYNECOLOGY
Payer: COMMERCIAL

## 2024-01-12 VITALS
DIASTOLIC BLOOD PRESSURE: 57 MMHG | SYSTOLIC BLOOD PRESSURE: 119 MMHG | RESPIRATION RATE: 18 BRPM | HEART RATE: 94 BPM | TEMPERATURE: 98.1 F

## 2024-01-12 LAB
AMPHET+METHAMPHET UR QL: NEGATIVE
AMPHETAMINES UR QL: POSITIVE
BACTERIA UR QL AUTO: ABNORMAL /HPF
BARBITURATES UR QL SCN: NEGATIVE
BENZODIAZ UR QL SCN: POSITIVE
BILIRUB UR QL STRIP: NEGATIVE
BUPRENORPHINE SERPL-MCNC: NEGATIVE NG/ML
CANNABINOIDS SERPL QL: NEGATIVE
CLARITY UR: ABNORMAL
COCAINE UR QL: NEGATIVE
COLOR UR: YELLOW
GLUCOSE UR STRIP-MCNC: NEGATIVE MG/DL
HGB UR QL STRIP.AUTO: ABNORMAL
HYALINE CASTS UR QL AUTO: ABNORMAL /LPF
KETONES UR QL STRIP: ABNORMAL
LEUKOCYTE ESTERASE UR QL STRIP.AUTO: NEGATIVE
METHADONE UR QL SCN: NEGATIVE
NITRITE UR QL STRIP: NEGATIVE
OPIATES UR QL: NEGATIVE
OXYCODONE UR QL SCN: NEGATIVE
PCP UR QL SCN: NEGATIVE
PH UR STRIP.AUTO: 6 [PH] (ref 4.5–8)
PROT UR QL STRIP: ABNORMAL
RBC # UR STRIP: ABNORMAL /HPF
REF LAB TEST METHOD: ABNORMAL
SP GR UR STRIP: 1.03 (ref 1–1.03)
SQUAMOUS #/AREA URNS HPF: ABNORMAL /HPF
TRICYCLICS UR QL SCN: NEGATIVE
UROBILINOGEN UR QL STRIP: ABNORMAL
WBC # UR STRIP: ABNORMAL /HPF

## 2024-01-12 PROCEDURE — 81001 URINALYSIS AUTO W/SCOPE: CPT | Performed by: OBSTETRICS & GYNECOLOGY

## 2024-01-12 PROCEDURE — 80306 DRUG TEST PRSMV INSTRMNT: CPT | Performed by: OBSTETRICS & GYNECOLOGY

## 2024-01-12 PROCEDURE — G0463 HOSPITAL OUTPT CLINIC VISIT: HCPCS

## 2024-01-12 PROCEDURE — 59025 FETAL NON-STRESS TEST: CPT

## 2024-01-12 RX ADMIN — ACETAMINOPHEN, ASPIRIN (NSAID), AND CAFFEINE 2 TABLET: 250; 250; 65 TABLET, FILM COATED ORAL at 23:10

## 2024-01-13 LAB
BACTERIA UR CULT: NORMAL
BACTERIA UR CULT: NORMAL

## 2024-01-13 NOTE — NURSING NOTE
Discharge instructions given. Pt educated on importance of hydration; drink water and/or gatorade. Pt also given Excedrin and instructed to  Macrobid RX. Pt verbalized understanding. Fetal kick counts were explained and kick count list was given. Next appointment on 1/15/24. Pt discharge home; left ambulatory.

## 2024-01-13 NOTE — NURSING NOTE
Dr. Hopson informed of pt arrival to [facility]. Pt c/o of HA, unrelieved by Tylenol, feeling tired all day and not feeling baby move as much as usual.  Denies N/V, vaginal bleeding, congestion. VSS. Lab results reviewed. Pt dehydrated. Order received for Excedrin and my discharge home. Keep follow up appointments.

## 2024-01-13 NOTE — NON STRESS TEST
"  Kylee Wells, a  at 27w4d with an LUAN of 2024, by Other [employer], was seen at [facility] for a nonstress test.    Chief Complaint   Patient presents with    Headache     Pt presents to [facility] with c/o of HA that started a few hours ago. States she has been feeling tired all day. Denies N/V, vaginal bleeding. Also \"states baby hasn't moved as much today as she normally does\". Last saw OB yesterday and was given Rx for Macrobid which she hasn't filled.       Patient Active Problem List   Diagnosis    Endometriosis    Obesity in pregnancy, antepartum    Gastroesophageal reflux disease without esophagitis    Current mild episode of major depressive disorder    History of gestational diabetes in prior pregnancy, currently pregnant    Pregnancy    Mild intermittent asthma without complication    Iron deficiency anemia    Abnormal ultrasound- small for dates; sacral abnormality- referred to MFM- all normal.    Bilateral sciatica    UTI symptoms    Feeling pelvic pressure during pregnancy, antepartum    Prenatal care in second trimester       Start Time:   Stop Time:     Interpretation A  Nonstress Test Interpretation A: Reactive               "

## 2024-01-15 ENCOUNTER — ROUTINE PRENATAL (OUTPATIENT)
Dept: OBSTETRICS AND GYNECOLOGY | Facility: CLINIC | Age: 29
End: 2024-01-15
Payer: COMMERCIAL

## 2024-01-15 ENCOUNTER — PATIENT OUTREACH (OUTPATIENT)
Dept: LABOR AND DELIVERY | Facility: HOSPITAL | Age: 29
End: 2024-01-15
Payer: COMMERCIAL

## 2024-01-15 VITALS — BODY MASS INDEX: 42.93 KG/M2 | SYSTOLIC BLOOD PRESSURE: 118 MMHG | WEIGHT: 266 LBS | DIASTOLIC BLOOD PRESSURE: 74 MMHG

## 2024-01-15 DIAGNOSIS — Z36.9 ENCOUNTER FOR ANTENATAL SCREENING, UNSPECIFIED: ICD-10-CM

## 2024-01-15 DIAGNOSIS — Z34.92 PRENATAL CARE IN SECOND TRIMESTER: Primary | ICD-10-CM

## 2024-01-15 LAB
A VAGINAE DNA VAG QL NAA+PROBE: NORMAL SCORE
BILIRUB BLD-MCNC: NEGATIVE MG/DL
BVAB2 DNA VAG QL NAA+PROBE: NORMAL SCORE
C ALBICANS DNA VAG QL NAA+PROBE: NEGATIVE
C GLABRATA DNA VAG QL NAA+PROBE: NEGATIVE
C TRACH DNA VAG QL NAA+PROBE: NEGATIVE
CLARITY, POC: CLEAR
COLOR UR: YELLOW
GLUCOSE 1H P 75 G GLC PO SERPL-MCNC: 177 MG/DL (ref 65–179)
GLUCOSE 2H P 75 G GLC PO SERPL-MCNC: 125 MG/DL (ref 65–154)
GLUCOSE P FAST SERPL-MCNC: 79 MG/DL (ref 65–94)
GLUCOSE UR STRIP-MCNC: NEGATIVE MG/DL
HCT VFR BLD AUTO: 32.9 % (ref 34–46.6)
HGB BLD-MCNC: 10.8 G/DL (ref 12–15.9)
KETONES UR QL: NEGATIVE
LEUKOCYTE EST, POC: NEGATIVE
M GENITALIUM DNA SPEC QL NAA+PROBE: NEGATIVE
M HOMINIS DNA SPEC QL NAA+PROBE: NEGATIVE
MEGA1 DNA VAG QL NAA+PROBE: NORMAL SCORE
N GONORRHOEA DNA VAG QL NAA+PROBE: NEGATIVE
NITRITE UR-MCNC: NEGATIVE MG/ML
PH UR: 5 [PH] (ref 5–8)
PROT UR STRIP-MCNC: NEGATIVE MG/DL
RBC # UR STRIP: NEGATIVE /UL
SP GR UR: 1.02 (ref 1–1.03)
T VAGINALIS DNA VAG QL NAA+PROBE: NEGATIVE
UREAPLASMA DNA SPEC QL NAA+PROBE: NEGATIVE
UROBILINOGEN UR QL: NORMAL

## 2024-01-15 NOTE — OUTREACH NOTE
[facility]  Unable to Reach       Questions/Answers      Flowsheet Row Responses   Pending Outreach Confirm Patient Interest   Call Attempt First   Outcome Left message            Left vm, encouraged to reach out with any needs. Will call again at a later date.      Morgan Galvin RN  Maternity Nurse Navigator    1/15/2024, 13:00 EST

## 2024-01-15 NOTE — PROGRESS NOTES
OB follow up     Kylee Wells is a 28 y.o.  28w0d being seen today for her obstetrical visit.  She is doing her 2hr GTT today. She had a growth US today. She was seen last week for pelvic c/o. She continues to have pelvic pain. She is using a pregnancy support belt.       Review of Systems  No bleeding, No cramping/contractions     /74   Wt 121 kg (266 lb)   LMP 2023 (Exact Date) Comment: Due 2024  BMI 42.93 kg/m²     FHT: 138 BPM   Uterine Size: Growth 52%        Assessment/Plan:    1) 28 y.o.  -pregnancy at 28w0d- 2hr GTT in progress. Rh +. US IMP: Growth 52%. EFW 2-9#. SAMMY 18cm. Fetal head deep in the pelvis.     2) ? SGA and sacral dimple- pt has seen MFM and had normal growth and normal spinal anatomy. LUAN 2024 reconfirmed.     3)Pt declined NIPT/AFP and/or Quad screen.      4) Pt declined CF/SMA/FX screening     5) H/O GDMA1 in previous pregnancy- HgBa1c 5.3, s/p early 2 hour GTT that was normal. Repeat 2 hour GTT in progress today. .      6) Maternal BMI 42- will need serial growth US 28 (52.8%), 32, 36 and 39 weeks.   MFM recs:   NST weekly at 36 weeks  Growth q 4 weeks  Del 39-40 weeks     7) Anxiety- pt on Buspar and Prozac and reports good symptom control. She also has a counselor.     8) Anemia- HgB 11.0 ( 2023), on BID iron. Check H/H today.      9) GERD- on Pepcid 20 mg BID     10) Flu shot contraindicated due to Latex allergy     11) Asthma- inhaler at home, rarely needs.     12) CS-pt plans on breastfeeding.  Progesterone only methods of birth control include the progesterone only birth control pills, the Depo-Provera shot, Nexplanon arm implant, hormonal IUD such as Renee, Mirena Kyleena and Liletta as well as nonhormonal IUD such as the copper T or ParaGard. Info on Mirena given.    13) Urinary c/o- UA normal today. Urine cx normal. NuSwab still pending.      14) Cramping and pelvic pressure- CL 3.8cm @ 27.3 weeks.  Enc pregnancy support belt. Has PT  appt 1/24/24.  REv katie s/s.     15) S>D- Growth 52.8% today.     16) tDap and RSV vaccine info provided     Reviewed this stage of pregnancy  Problem list updated   RTO 2 weeks    Jinny Meade, APRN  1/15/2024  09:22 EST

## 2024-01-16 ENCOUNTER — PATIENT OUTREACH (OUTPATIENT)
Dept: LABOR AND DELIVERY | Facility: HOSPITAL | Age: 29
End: 2024-01-16
Payer: COMMERCIAL

## 2024-01-16 DIAGNOSIS — K21.9 GASTROESOPHAGEAL REFLUX DISEASE WITHOUT ESOPHAGITIS: ICD-10-CM

## 2024-01-16 RX ORDER — FAMOTIDINE 20 MG/1
20 TABLET, FILM COATED ORAL 2 TIMES DAILY
Qty: 60 TABLET | Refills: 3 | Status: SHIPPED | OUTPATIENT
Start: 2024-01-16

## 2024-01-16 NOTE — OUTREACH NOTE
[facility]  Unable to Reach       Questions/Answers      Flowsheet Row Responses   Pending Outreach Confirm Patient Interest   Call Attempt Second   Outcome Not available            Declined program for pt d/t UTR: 2 call attempts and 1 mychart msg sent. Pt has my contact info and encouraged her to reach out with any needs.      Morgan Galvin RN  Maternity Nurse Navigator    1/16/2024, 15:32 EST

## 2024-01-23 ENCOUNTER — HOSPITAL ENCOUNTER (OUTPATIENT)
Facility: HOSPITAL | Age: 29
Discharge: HOME | End: 2024-01-23
Attending: OBSTETRICS & GYNECOLOGY | Admitting: OBSTETRICS & GYNECOLOGY
Payer: COMMERCIAL

## 2024-01-23 VITALS
RESPIRATION RATE: 20 BRPM | TEMPERATURE: 99.5 F | SYSTOLIC BLOOD PRESSURE: 111 MMHG | DIASTOLIC BLOOD PRESSURE: 56 MMHG | HEART RATE: 95 BPM

## 2024-01-23 LAB
AMPHET+METHAMPHET UR QL: NEGATIVE
AMPHETAMINES UR QL: POSITIVE
BACTERIA UR QL AUTO: ABNORMAL /HPF
BARBITURATES UR QL SCN: NEGATIVE
BENZODIAZ UR QL SCN: POSITIVE
BILIRUB UR QL STRIP: NEGATIVE
BUPRENORPHINE SERPL-MCNC: NEGATIVE NG/ML
CANNABINOIDS SERPL QL: NEGATIVE
CLARITY UR: CLEAR
COCAINE UR QL: NEGATIVE
COLOR UR: ABNORMAL
GLUCOSE UR STRIP-MCNC: NEGATIVE MG/DL
HGB UR QL STRIP.AUTO: NEGATIVE
HYALINE CASTS UR QL AUTO: ABNORMAL /LPF
KETONES UR QL STRIP: ABNORMAL
LEUKOCYTE ESTERASE UR QL STRIP.AUTO: NEGATIVE
METHADONE UR QL SCN: NEGATIVE
NITRITE UR QL STRIP: NEGATIVE
OPIATES UR QL: NEGATIVE
OXYCODONE UR QL SCN: NEGATIVE
PCP UR QL SCN: NEGATIVE
PH UR STRIP.AUTO: 6 [PH] (ref 4.5–8)
PROT UR QL STRIP: ABNORMAL
RBC # UR STRIP: ABNORMAL /HPF
REF LAB TEST METHOD: ABNORMAL
SP GR UR STRIP: 1.04 (ref 1–1.03)
SQUAMOUS #/AREA URNS HPF: ABNORMAL /HPF
TRICYCLICS UR QL SCN: NEGATIVE
UROBILINOGEN UR QL STRIP: ABNORMAL
WBC # UR STRIP: ABNORMAL /HPF

## 2024-01-23 PROCEDURE — 80306 DRUG TEST PRSMV INSTRMNT: CPT | Performed by: OBSTETRICS & GYNECOLOGY

## 2024-01-23 PROCEDURE — 81001 URINALYSIS AUTO W/SCOPE: CPT | Performed by: OBSTETRICS & GYNECOLOGY

## 2024-01-23 PROCEDURE — 59025 FETAL NON-STRESS TEST: CPT

## 2024-01-23 PROCEDURE — G0463 HOSPITAL OUTPT CLINIC VISIT: HCPCS

## 2024-01-23 RX ORDER — ACETAMINOPHEN 500 MG
1000 TABLET ORAL ONCE
Status: COMPLETED | OUTPATIENT
Start: 2024-01-23 | End: 2024-01-23

## 2024-01-23 RX ADMIN — ACETAMINOPHEN 1000 MG: 500 TABLET ORAL at 20:51

## 2024-01-24 ENCOUNTER — HOSPITAL ENCOUNTER (OUTPATIENT)
Dept: PHYSICAL THERAPY | Facility: HOSPITAL | Age: 29
Setting detail: THERAPIES SERIES
Discharge: HOME | End: 2024-01-24
Payer: COMMERCIAL

## 2024-01-24 DIAGNOSIS — M54.31 BILATERAL SCIATICA: Primary | ICD-10-CM

## 2024-01-24 DIAGNOSIS — M54.32 BILATERAL SCIATICA: Primary | ICD-10-CM

## 2024-01-24 PROCEDURE — 97161 PT EVAL LOW COMPLEX 20 MIN: CPT | Performed by: PHYSICAL THERAPIST

## 2024-01-24 NOTE — NURSING NOTE
Pt verbalized understanding of discharge instructions, including to keep next scheduled appts, call the office for concerns and when to return to L&D. Written copy of instructions given to pt. Pt discharged from facility ambulatory, by herself, in stable pregnant, undelivered condition.

## 2024-01-24 NOTE — NURSING NOTE
Dr Hopson informed of pt's arrival to unit, c/o decreased FM and sharp low back pain. PT denies LOF, VB, abdominal cramping. NST reactive, no ctxs on tracing. Urine specimen dark and concentrated. UA and UDS reviewed. Orders received for PO Tylenol and DC home.

## 2024-01-24 NOTE — NON STRESS TEST
Kylee Wells, a  at 29w1d with an LUAN of 2024, by Other [employer], was seen at [facility] OB [facility]YN for a nonstress test.    Chief Complaint   Patient presents with    Decreased Fetal Movement    Back Pain       Patient Active Problem List   Diagnosis    Endometriosis    Obesity in pregnancy, antepartum    Gastroesophageal reflux disease without esophagitis    Current mild episode of major depressive disorder    History of gestational diabetes in prior pregnancy, currently pregnant    Pregnancy    Mild intermittent asthma without complication    Iron deficiency anemia    Abnormal ultrasound- small for dates; sacral abnormality- referred to M- all normal.    Bilateral sciatica    UTI symptoms    Feeling pelvic pressure during pregnancy, antepartum    Prenatal care in second trimester       Start Time:   Stop Time:     Interpretation A  Nonstress Test Interpretation A: Reactive

## 2024-01-24 NOTE — THERAPY EVALUATION
Outpatient Physical Therapy Ortho Initial Evaluation  KATHIA Eng     Patient Name: Kylee Wells  : 1995  MRN: [id number]  Today's Date: 2024      Visit Date: 2024    Patient Active Problem List   Diagnosis    Endometriosis    Obesity in pregnancy, antepartum    Gastroesophageal reflux disease without esophagitis    Current mild episode of major depressive disorder    History of gestational diabetes in prior pregnancy, currently pregnant    Pregnancy    Mild intermittent asthma without complication    Iron deficiency anemia    Abnormal ultrasound- small for dates; sacral abnormality- referred to [facility]- all normal.    Bilateral sciatica    UTI symptoms    Feeling pelvic pressure during pregnancy, antepartum    Prenatal care in second trimester        Past Medical History:   Diagnosis Date    Anemia     Anxiety     Asthma     Endometriosis     Uncomfirmed    Gestational diabetes     Migraine     Rapid heart beat     Urinary tract infection         Past Surgical History:   Procedure Laterality Date    CHOLECYSTECTOMY      TONSILLECTOMY         Visit Dx:     ICD-10-CM ICD-9-CM   1. Bilateral sciatica  M54.31 724.3    M54.32           Patient History       Row Name 24 0900             History    Chief Complaint Pain  -SP      Type of Pain Back pain  -SP      Date Current Problem(s) Began --  approximate one month onset  -SP      Brief Description of Current Complaint Patient is 29weeks pregnant.  She reports that approximately 1 month ago she began having low back area pain that radiates into bilateral LEs, especially right side. Patient describes N/T into right LE to just below knee.  Patient states that she  is noticing weakness in right leg and occasionally into left. Patient reports that this is her third pregnancy and she has not had this kind of pain before.  Patient is using a maternity belt that seems to help intermittently but at time increases pain.  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  -SP      How has patient tried to help current problem? lying on side  -SP         Pain     Pain Location Back;Hip;Leg  -SP      Pain at Present 7  -SP      Pain at Best 5  -SP      Pain at Worst 10  -SP      Pain Frequency Constant/continuous  -SP      Pain Description Sore;Radiating;Aching;Sharp;Numbness  -SP      What Performance Factors Make the Current Problem(s) WORSE? \"anything\" standing walking bending, sitting  -SP      What Performance Factors Make the Current Problem(s) BETTER? sidelying  -SP      Tolerance Time- Standing \"a couple minutes\"  -SP      Tolerance Time- Sitting pain with sitting within 10 min  -SP      Tolerance Time- Walking limited  -SP      Is your sleep disturbed? Yes  -SP      What position do you sleep in? Right sidelying;Left sidelying  -SP      Difficulties at work? difficulty tolerating prolonged sit required for work duties  -SP      Difficulties with ADL's? difficulty with all ADLs, weight bearing bending forward.  -SP         Fall Risk Assessment    Any falls in the past year: No  -SP         Daily Activities    Primary Language English  -SP      Are you able to read Yes  -SP      Are you able to write Yes  -SP      How does patient learn best? Listening;Reading;Demonstration;Pictures/Video  -SP      Teaching needs identified Home Exercise Program;Management of Condition  -SP      Patient is concerned about/has problems with Performing home management (household chores, shopping, care of dependents);Performing job responsibilities/community activities (work, school,  -SP      Does patient have problems with the following? Depression;Anxiety  -SP      Barriers to learning None  -SP      Pt Participated in POC and Goals Yes  -SP         Safety    Are you being hurt, hit, or " frightened by anyone at home or in your life? No  -SP      Are you being neglected by a caregiver No  -SP                User Key  (r) = Recorded By, (t) = Taken By, (c) = Cosigned By      Initials Name Provider Type    Doris Tian, PT Physical Therapist                     PT Ortho       Row Name 01/24/24 1000       Precautions and Contraindications    Precautions --  patient is 29 weeks pregnant  -SP       Posture/Observations    Cervical Lordosis Decreased  -SP    Thoracic Kyphosis Mild;Increased  -SP    Rounded Shoulders Bilateral:;Moderate  -SP    Lumbar lordosis Increased  -SP    Iliac crests Bilateral:;Normal  -SP    Genu valgus Bilateral:;Mild  -SP       Neural Tension Signs- Lower Quarter Clearing    SLR Bilateral:;Negative  -SP       Sensory Screen for Light Touch- Lower Quarter Clearing    L1 (inguinal area) Bilateral:;Intact  -SP    L2 (anterior mid thigh) Right:;Diminished  -SP    L3 (distal anterior thigh) Right:;Diminished  -SP    L4 (medial lower leg/foot) Bilateral:;Intact  -SP    L5 (lateral lower leg/great toe) Bilateral:;Intact  -SP    S1 (bottom of foot) Bilateral:;Intact  -SP       Myotomal Screen- Lower Quarter Clearing    Hip flexion (L2) Right:;3+ (Fair +);Left:;4- (Good -)  -SP    Knee extension (L3) Right:;4- (Good -);Left:;4 (Good)  -SP    Ankle DF (L4) Right:;4- (Good -);Left:;4 (Good)  -SP    Great toe extension (L5) Right:;4- (Good -);Left:;4 (Good)  -SP    Ankle PF (S1) Right:;4- (Good -);Left:;4 (Good)  -SP    Knee flexion (S2) Right:;4- (Good -);Left:;4 (Good)  -SP       Lumbar/SI Special Tests    SLR (Neural Tension) Bilateral:;Negative  -SP       Special Lumbosacral Questions    Are you pregnant? Yes  -SP       Head/Neck/Trunk    Trunk Extension AROM WFL with complaints of pain  -SP    Trunk Flexion AROM decreased by 75% from full range with pain  -SP    Trunk Lt Lateral Flexion AROM decreased by 75% from full range with pain  -SP    Trunk Rt Lateral Flexion AROM  decreased by 75% from full range with pain  -SP    Trunk Lt Rotation AROM WFL with pain  -SP    Trunk Rt Rotation AROM WFL with pain  -SP       MMT Neck/Trunk    Left Pelvic Elevation MMT, Gross Movement (2/5) poor  -SP    Right Pelvic Elevation MMT, Gross Movement: (2/5) poor  -SP       Lower Extremity Flexibility    Hamstrings Bilateral:;Moderately limited  -SP    Hip Flexors Bilateral:;Moderately limited  -SP    Hip External Rotators Bilateral:;Moderately limited  -SP       Transfers    Bed-Chair Curry (Transfers) independent  -SP    Chair-Bed Curry (Transfers) independent  -SP    Sit-Stand Curry (Transfers) independent  -SP    Stand-Sit Curry (Transfers) independent  -SP    Comment, (Transfers) patient uses bilateral UEs to assist with transfers sit to stand.  Patient with antalgic transfers with increased time required  -SP       Gait/Stairs (Locomotion)    Curry Level (Gait) independent  -SP    Deviations/Abnormal Patterns (Gait) base of support, wide;gait speed decreased;stride length decreased  -SP              User Key  (r) = Recorded By, (t) = Taken By, (c) = Cosigned By      Initials Name Provider Type    Doris Tian, PT Physical Therapist                                Therapy Education  Given: HEP, Symptoms/condition management  Program: New  How Provided: Verbal, Written  Provided to: Patient  Level of Understanding: Verbalized, Demonstrated      PT OP Goals       Row Name 01/24/24 1100          PT Short Term Goals    STG Date to Achieve 02/08/24  -SP     STG 1 Patient transfers sit to stand with decreased use of UE to assist  -SP     STG 2 Patient reports she is able to tolerate household mobility with pain level <2/10 at worst  -SP        Long Term Goals    LTG Date to Achieve 02/23/24  -SP     LTG 1 Patient reports she is able to tolerate work duties with <2/10 pain level  -SP     LTG 2 Patient independent with HEP  -SP        Time Calculation    PT  "Goal Re-Cert Due Date 02/23/24  -SP               User Key  (r) = Recorded By, (t) = Taken By, (c) = Cosigned By      Initials Name Provider Type    Doris Tian, PT Physical Therapist                     PT Assessment/Plan       Row Name 01/24/24 1149          PT Assessment    Assessment Comments Patient is 29 week pregnant with approximate 1 month onset of low back pain with radiating symptoms into bilateral LEs.  Patient presents with pain, decreased trunk mobility, increased lumbar lordosis/postural malalignment, difficutly with transfers, and difficulty tolerating home, work and community ADLs  -SP     Please refer to paper survey for additional self-reported information Yes  -SP     Rehab Potential Fair  -SP     Patient/caregiver participated in establishment of treatment plan and goals Yes  -SP     Patient would benefit from skilled therapy intervention Yes  -SP        PT Plan    PT Frequency 1x/week  -SP     Predicted Duration of Therapy Intervention (PT) 4 weeks  -SP     Planned CPT's? PT EVAL LOW COMPLEXITY: 35585;PT THER PROC EA 15 MIN: 75695;PT HOT OR COLD PACK TREAT MCARE;PT MANUAL THERAPY EA 15 MIN: 00095  -SP               User Key  (r) = Recorded By, (t) = Taken By, (c) = Cosigned By      Initials Name Provider Type    Doris Tian, PT Physical Therapist                     Modalities       Row Name 01/24/24 1100             Moist Heat    MH Applied Yes  -SP      Location sacral area in sitting with cervical MHP  -SP      PT Moist Heat Minutes 10  -SP         Other Treatment Provided    Taping / Bracing kinesiotape for lumbar support:  2 \"I\" strips anchored at sacral area and pulled along lumbar paraspinals; 2 \"I\" strips in x pattern over lower lumbar spine  -SP                User Key  (r) = Recorded By, (t) = Taken By, (c) = Cosigned By      Initials Name Provider Type    Doris Tian, PT Physical Therapist                   OP Exercises       Row Name " 01/24/24 1100             Exercise 1    Exercise Name 1 sidelying pelvic tilt  -SP      Cueing 1 Verbal;Demo  -SP      Reps 1 10  -SP      Time 1 5 sec  -SP         Exercise 2    Exercise Name 2 seated pelvic tilt  -SP      Cueing 2 Verbal;Demo  -SP      Reps 2 10  -SP      Time 2 5 sec  -SP         Exercise 3    Exercise Name 3 cat/camel  -SP      Cueing 3 Verbal;Demo  -SP      Reps 3 10  -SP      Time 3 10 sec  -SP         Exercise 4    Exercise Name 4 seated trunk flexion stretch (swiss ball)  -SP      Cueing 4 Verbal;Demo  -SP      Reps 4 10  -SP      Time 4 10 sec  -SP         Exercise 5    Exercise Name 5 PPT vs wall  -SP      Cueing 5 Verbal;Demo  -SP      Reps 5 10  -SP      Additional Comments 5 sec  -SP                User Key  (r) = Recorded By, (t) = Taken By, (c) = Cosigned By      Initials Name Provider Type    Doris Tian, PT Physical Therapist                                  Outcome Measure Options: Other Outcome Measure  Other Outcome Measure Tool Used  Other Outcome Measure Tool Comments: Back index score 64      Time Calculation:     Start Time: 0900  Stop Time: 1000  Time Calculation (min): 60 min  Untimed Charges  PT Eval/Re-eval Minutes: 60  PT Moist Heat Minutes: 10  Total Minutes  Untimed Charges Total Minutes: 60   Total Minutes: 60     Therapy Charges for Today       Code Description Service Date Service Provider Modifiers Qty    06606319245 HC PT EVAL LOW COMPLEXITY 4 1/24/2024 Doris Zaldivar, PT GP 1            PT G-Codes  Outcome Measure Options: Other Outcome Measure         Doris Zaldivar, PT  1/24/2024

## 2024-01-24 NOTE — DISCHARGE INSTRUCTIONS
Keep next scheduled appointment at the office. Call the office for any concerns. Return to L&D for contractions, leaking of fluid, or vaginal bleeding.

## 2024-01-24 NOTE — NURSING NOTE
Pt arrived to unit for evaluation of decreased fetal movement, sharp low back pain, and 'tiredness'. Denies LOF, VB, or abdominal cramping. Pt states she was sitting at work when back pain initially progressed and she fell asleep while working. Pt's urine specimen dark and concentrated, acknowledges not drinking enough water today, PO hydration given to pt.

## 2024-01-25 ENCOUNTER — TELEPHONE (OUTPATIENT)
Dept: OBSTETRICS AND GYNECOLOGY | Facility: CLINIC | Age: 29
End: 2024-01-25

## 2024-01-25 NOTE — TELEPHONE ENCOUNTER
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

## 2024-01-29 ENCOUNTER — ROUTINE PRENATAL (OUTPATIENT)
Dept: OBSTETRICS AND GYNECOLOGY | Facility: CLINIC | Age: 29
End: 2024-01-29
Payer: COMMERCIAL

## 2024-01-29 VITALS — WEIGHT: 267 LBS | DIASTOLIC BLOOD PRESSURE: 76 MMHG | BODY MASS INDEX: 43.09 KG/M2 | SYSTOLIC BLOOD PRESSURE: 134 MMHG

## 2024-01-29 DIAGNOSIS — D50.8 OTHER IRON DEFICIENCY ANEMIA: ICD-10-CM

## 2024-01-29 DIAGNOSIS — Z23 NEED FOR TDAP VACCINATION: ICD-10-CM

## 2024-01-29 DIAGNOSIS — O09.299 HISTORY OF GESTATIONAL DIABETES IN PRIOR PREGNANCY, CURRENTLY PREGNANT: ICD-10-CM

## 2024-01-29 DIAGNOSIS — R93.89 ABNORMAL ULTRASOUND: ICD-10-CM

## 2024-01-29 DIAGNOSIS — O99.210 OBESITY IN PREGNANCY, ANTEPARTUM: ICD-10-CM

## 2024-01-29 DIAGNOSIS — Z3A.30 30 WEEKS GESTATION OF PREGNANCY: ICD-10-CM

## 2024-01-29 DIAGNOSIS — Z86.32 HISTORY OF GESTATIONAL DIABETES IN PRIOR PREGNANCY, CURRENTLY PREGNANT: ICD-10-CM

## 2024-01-29 DIAGNOSIS — Z36.9 ENCOUNTER FOR ANTENATAL SCREENING, UNSPECIFIED: Primary | ICD-10-CM

## 2024-01-29 PROBLEM — Z34.92 PRENATAL CARE IN SECOND TRIMESTER: Status: RESOLVED | Noted: 2024-01-11 | Resolved: 2024-01-29

## 2024-01-29 PROBLEM — R39.9 UTI SYMPTOMS: Status: RESOLVED | Noted: 2024-01-11 | Resolved: 2024-01-29

## 2024-01-29 PROBLEM — N80.9 ENDOMETRIOSIS: Status: RESOLVED | Noted: 2018-02-28 | Resolved: 2024-01-29

## 2024-01-29 PROBLEM — R10.2 FEELING PELVIC PRESSURE DURING PREGNANCY, ANTEPARTUM: Status: RESOLVED | Noted: 2024-01-11 | Resolved: 2024-01-29

## 2024-01-29 PROBLEM — O26.899 FEELING PELVIC PRESSURE DURING PREGNANCY, ANTEPARTUM: Status: RESOLVED | Noted: 2024-01-11 | Resolved: 2024-01-29

## 2024-01-29 NOTE — PROGRESS NOTES
OB follow up     Kylee Wells is a 28 y.o.  30+ being seen today for her obstetrical visit.  FMLA paperwork today for sciatica.        Review of Systems  No bleeding, No cramping/contractions     /76   Wt 121 kg (267 lb)   LMP 2023 (Exact Date) Comment: Due 2024  BMI 43.09 kg/m²     Vitals: VSS; AF    General Appearance:  Awake. Alert. Well developed. Well nourished. In no acute distress.    Visual Inspection: ° Abdomen was normal on visual inspection.  Palpation: ° Abdomen was soft. ° Abdominal non-tender.    Uterus: ° Fundal height was normal for gestational age. ° Not tender.  Uterine Adnexae: ° Normal without masses or tenderness.  Neurological:  ° Oriented to time, place, and person.  Skin:  ° General appearance was normal. No bruising or ecchymosis.  Obstetrical: +FM and FCA     Assessment/Plan:    1) 28 y.o.  -pregnancy at 30+    2) ? SGA and sacral dimple- pt has seen MFM and had normal growth and normal spinal anatomy. LUAN 2024 reconfirmed.     3) Pt declined NIPT/AFP and/or Quad screen.      4) Pt declined CF/SMA/FX screening     5) H/O GDMA1 in previous pregnancy- HgBa1c 5.3, s/p early 2 hour GTT that was normal. Repeat 2 hour GTT in progress today. .      6) Maternal BMI 42- will need serial growth US 28 (52.8%), 32, 36 and 39 weeks.   MFM recs:   NST weekly at 36 weeks  Growth q 4 weeks  Del 39-40 weeks     7) Anxiety- pt on Buspar and Prozac and reports good symptom control. She also has a counselor.     8) Anemia- HgB 11.0 ( 2023), on BID iron. Check H/H today.      9) GERD- on Pepcid 20 mg BID     10) Flu shot contraindicated due to Latex allergy     11) Asthma- inhaler at home, rarely needs.     12) CS-pt plans on breastfeeding.  Progesterone only methods of birth control include the progesterone only birth control pills, the Depo-Provera shot, Nexplanon arm implant, hormonal IUD such as Renee, Mirena Kyleena and Liletta as well as nonhormonal IUD such  as the copper T or ParaGard. Info on Mirena given.    13) Sciatica   FMLA paperwork   Seeing PT      14) tDap received      Reviewed this stage of pregnancy  Problem list updated   RTO 2 weeks, OB     Blu Prabhakar, DO  1/29/2024  16:07 EST

## 2024-01-31 ENCOUNTER — APPOINTMENT (OUTPATIENT)
Dept: PHYSICAL THERAPY | Facility: HOSPITAL | Age: 29
End: 2024-01-31
Payer: COMMERCIAL

## 2024-02-13 ENCOUNTER — ROUTINE PRENATAL (OUTPATIENT)
Dept: OBSTETRICS AND GYNECOLOGY | Facility: CLINIC | Age: 29
End: 2024-02-13
Payer: COMMERCIAL

## 2024-02-13 VITALS — SYSTOLIC BLOOD PRESSURE: 124 MMHG | DIASTOLIC BLOOD PRESSURE: 78 MMHG | BODY MASS INDEX: 43.26 KG/M2 | WEIGHT: 268 LBS

## 2024-02-13 DIAGNOSIS — F32.0 CURRENT MILD EPISODE OF MAJOR DEPRESSIVE DISORDER, UNSPECIFIED WHETHER RECURRENT: ICD-10-CM

## 2024-02-13 DIAGNOSIS — M54.32 BILATERAL SCIATICA: ICD-10-CM

## 2024-02-13 DIAGNOSIS — O09.299 HISTORY OF GESTATIONAL DIABETES IN PRIOR PREGNANCY, CURRENTLY PREGNANT: ICD-10-CM

## 2024-02-13 DIAGNOSIS — Z3A.32 32 WEEKS GESTATION OF PREGNANCY: ICD-10-CM

## 2024-02-13 DIAGNOSIS — J45.20 MILD INTERMITTENT ASTHMA WITHOUT COMPLICATION: ICD-10-CM

## 2024-02-13 DIAGNOSIS — D50.9 IRON DEFICIENCY ANEMIA, UNSPECIFIED IRON DEFICIENCY ANEMIA TYPE: ICD-10-CM

## 2024-02-13 DIAGNOSIS — O99.210 OBESITY IN PREGNANCY, ANTEPARTUM: ICD-10-CM

## 2024-02-13 DIAGNOSIS — M54.31 BILATERAL SCIATICA: ICD-10-CM

## 2024-02-13 DIAGNOSIS — Z86.32 HISTORY OF GESTATIONAL DIABETES IN PRIOR PREGNANCY, CURRENTLY PREGNANT: ICD-10-CM

## 2024-02-13 DIAGNOSIS — R93.89 ABNORMAL ULTRASOUND: ICD-10-CM

## 2024-02-13 DIAGNOSIS — Z36.9 ENCOUNTER FOR ANTENATAL SCREENING, UNSPECIFIED: Primary | ICD-10-CM

## 2024-02-13 RX ORDER — ONDANSETRON 4 MG/1
TABLET, ORALLY DISINTEGRATING ORAL
COMMUNITY
Start: 2024-02-11

## 2024-02-25 ENCOUNTER — HOSPITAL ENCOUNTER (OUTPATIENT)
Facility: HOSPITAL | Age: 29
Discharge: HOME | End: 2024-02-25
Attending: OBSTETRICS & GYNECOLOGY | Admitting: OBSTETRICS & GYNECOLOGY
Payer: COMMERCIAL

## 2024-02-25 VITALS
TEMPERATURE: 97.7 F | SYSTOLIC BLOOD PRESSURE: 119 MMHG | HEART RATE: 88 BPM | DIASTOLIC BLOOD PRESSURE: 66 MMHG | RESPIRATION RATE: 18 BRPM

## 2024-02-25 PROCEDURE — 59025 FETAL NON-STRESS TEST: CPT

## 2024-02-25 PROCEDURE — G0463 HOSPITAL OUTPT CLINIC VISIT: HCPCS

## 2024-02-25 PROCEDURE — 63710000001 ONDANSETRON ODT 4 MG TABLET DISPERSIBLE: Performed by: OBSTETRICS & GYNECOLOGY

## 2024-02-25 RX ORDER — ONDANSETRON 4 MG/1
4 TABLET, ORALLY DISINTEGRATING ORAL ONCE
Status: COMPLETED | OUTPATIENT
Start: 2024-02-25 | End: 2024-02-25

## 2024-02-25 RX ORDER — FAMOTIDINE 20 MG/1
20 TABLET, FILM COATED ORAL ONCE
Status: COMPLETED | OUTPATIENT
Start: 2024-02-25 | End: 2024-02-25

## 2024-02-25 RX ADMIN — FAMOTIDINE 20 MG: 20 TABLET, FILM COATED ORAL at 19:45

## 2024-02-25 RX ADMIN — ONDANSETRON 4 MG: 4 TABLET, ORALLY DISINTEGRATING ORAL at 19:45

## 2024-02-26 NOTE — NURSING NOTE
"Dr. Rubio updated; PO hydration complete. Abdomen palpates soft, non tender. No ctx noted. Pt states \"feeling occasional cramp\". Nausea relieved by Zofran & Pepcid. Per MD, may d/c pt home. Keep follow up appointment on 2/27/20  "

## 2024-02-26 NOTE — NON STRESS TEST
Kylee Wells, a  at 33w6d with an LUAN of 2024, by [employer], was seen at [facility] for a nonstress test.    Chief Complaint   Patient presents with    Abdominal Cramping     [facility]/o of intermittent abdominal cramping that started around 1630 this afternoon.Pt also c/o of nausea; states she has been feeling that all week and pelvic pressure. Has not eaten today. Has not vomited. Denies vaginal bleeding or leaking.       Patient Active Problem List   Diagnosis    Obesity in pregnancy, antepartum    Gastroesophageal reflux disease without esophagitis    Current mild episode of major depressive disorder    History of gestational diabetes in prior pregnancy, currently pregnant    Pregnancy    Mild intermittent asthma without complication    Iron deficiency anemia    Abnormal ultrasound- small for dates; sacral abnormality- referred to [facility]- all normal.    Bilateral sciatica       Start Time:   Stop Time:     Interpretation A  Nonstress Test Interpretation A: Reactive

## 2024-02-26 NOTE — NURSING NOTE
Dr. Rubio on unit. Informed of pt c/o of nausea, abdominal cramping and pelvic pressure. VSS; afebrile. Abdomen palpates soft, non tender. Denies vaginal bleeding. Order received for Pepcid, Zofran and PO hydration.

## 2024-02-26 NOTE — NURSING NOTE
Discharge instructions given. Pt educated on  labor, importance of PO hydration. Spotting is normal due to SVE. Return or call office if experience ctx more than 6 in hour, bright red bleeding, leaking of fluid. Instructed to keep next appointment on 24. Pt verbalizes understanding. Discharged home; ambulatory.

## 2024-02-27 ENCOUNTER — ROUTINE PRENATAL (OUTPATIENT)
Dept: OBSTETRICS AND GYNECOLOGY | Facility: CLINIC | Age: 29
End: 2024-02-27
Payer: COMMERCIAL

## 2024-02-27 VITALS — DIASTOLIC BLOOD PRESSURE: 80 MMHG | BODY MASS INDEX: 44.1 KG/M2 | WEIGHT: 273.2 LBS | SYSTOLIC BLOOD PRESSURE: 138 MMHG

## 2024-02-27 DIAGNOSIS — Z36.9 ENCOUNTER FOR ANTENATAL SCREENING, UNSPECIFIED: ICD-10-CM

## 2024-02-27 DIAGNOSIS — O99.210 OBESITY IN PREGNANCY, ANTEPARTUM: ICD-10-CM

## 2024-02-27 DIAGNOSIS — M54.32 BILATERAL SCIATICA: ICD-10-CM

## 2024-02-27 DIAGNOSIS — D50.8 IRON DEFICIENCY ANEMIA SECONDARY TO INADEQUATE DIETARY IRON INTAKE: ICD-10-CM

## 2024-02-27 DIAGNOSIS — M54.31 BILATERAL SCIATICA: ICD-10-CM

## 2024-02-27 DIAGNOSIS — Z3A.34 34 WEEKS GESTATION OF PREGNANCY: Primary | ICD-10-CM

## 2024-02-27 LAB
BILIRUB BLD-MCNC: NEGATIVE MG/DL
CLARITY, POC: CLEAR
COLOR UR: YELLOW
GLUCOSE UR STRIP-MCNC: NEGATIVE MG/DL
KETONES UR QL: NEGATIVE
LEUKOCYTE EST, POC: NEGATIVE
NITRITE UR-MCNC: NEGATIVE MG/ML
PH UR: 8 [PH] (ref 5–8)
PROT UR STRIP-MCNC: ABNORMAL MG/DL
RBC # UR STRIP: NEGATIVE /UL
SP GR UR: 1 (ref 1–1.03)
UROBILINOGEN UR QL: NORMAL

## 2024-02-27 NOTE — PROGRESS NOTES
OB follow up     Kylee Wells is a 28 y.o.  34w1d being seen today for her obstetrical visit.  Patient reports no bleeding, no leaking, and occasional contractions. Fetal movement: normal.  Patient complaining of worsening sciatica.  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  She feels like that would worsen her hip pain by sitting for such a long extended period of time.  I agree and we need to revisit that paperwork.  In addition prenatal care is complicated by anemia.  She takes iron daily.  Last hemoglobin was 11.    Review of Systems  No bleeding, No cramping/contractions     /80   Wt 124 kg (273 lb 3.2 oz)   LMP 2023 (Exact Date) Comment: Due 2024  BMI 44.10 kg/m²     FHT: present BPM   Uterine Size:         Assessment/Plan:    1) 28 y.o.  -pregnancy at 34w1d    2)   Encounter Diagnoses   Name Primary?    34 weeks gestation of pregnancy Yes    Encounter for  screening, unspecified     Bilateral sciatica     Iron deficiency anemia secondary to inadequate dietary iron intake     Obesity in pregnancy, antepartum    Growth scan due to increased BMI next visit.  GBS next visit.  Revisit FMLA papers to take off work due to worsening bilateral sciatica.     3) Reviewed this stage of pregnancy  4) Problem list updated     Return in about 2 weeks (around 3/12/2024) for US, Growth, OB INT, GBS.    I spent 20 minutes caring for Kylee on this date of service. This time includes time spent by me in the following activities: preparing for the visit, reviewing tests, obtaining and/or reviewing a separately obtained history, performing a medically appropriate examination and/or evaluation, counseling and educating the patient/family/caregiver, ordering medications, tests, or procedures, and documenting information in the medical record      Erasto Hopson MD    2024  16:11 EST

## 2024-03-11 ENCOUNTER — ROUTINE PRENATAL (OUTPATIENT)
Dept: OBSTETRICS AND GYNECOLOGY | Facility: CLINIC | Age: 29
End: 2024-03-11
Payer: COMMERCIAL

## 2024-03-11 VITALS — SYSTOLIC BLOOD PRESSURE: 128 MMHG | DIASTOLIC BLOOD PRESSURE: 82 MMHG | BODY MASS INDEX: 43.93 KG/M2 | WEIGHT: 272.2 LBS

## 2024-03-11 DIAGNOSIS — O99.210 OBESITY IN PREGNANCY, ANTEPARTUM: ICD-10-CM

## 2024-03-11 DIAGNOSIS — O09.299 HISTORY OF GESTATIONAL DIABETES IN PRIOR PREGNANCY, CURRENTLY PREGNANT: ICD-10-CM

## 2024-03-11 DIAGNOSIS — K21.9 GASTROESOPHAGEAL REFLUX DISEASE WITHOUT ESOPHAGITIS: ICD-10-CM

## 2024-03-11 DIAGNOSIS — M54.31 BILATERAL SCIATICA: ICD-10-CM

## 2024-03-11 DIAGNOSIS — M54.32 BILATERAL SCIATICA: ICD-10-CM

## 2024-03-11 DIAGNOSIS — Z36.9 ENCOUNTER FOR ANTENATAL SCREENING, UNSPECIFIED: ICD-10-CM

## 2024-03-11 DIAGNOSIS — Z34.93 PRENATAL CARE IN THIRD TRIMESTER: Primary | ICD-10-CM

## 2024-03-11 DIAGNOSIS — Z86.32 HISTORY OF GESTATIONAL DIABETES IN PRIOR PREGNANCY, CURRENTLY PREGNANT: ICD-10-CM

## 2024-03-11 DIAGNOSIS — Z36.85 ENCOUNTER FOR ANTENATAL SCREENING FOR STREPTOCOCCUS B: ICD-10-CM

## 2024-03-11 DIAGNOSIS — D50.8 OTHER IRON DEFICIENCY ANEMIA: ICD-10-CM

## 2024-03-11 DIAGNOSIS — R93.89 ABNORMAL ULTRASOUND: ICD-10-CM

## 2024-03-11 LAB
BILIRUB BLD-MCNC: NEGATIVE MG/DL
CLARITY, POC: CLEAR
COLOR UR: YELLOW
GLUCOSE UR STRIP-MCNC: NEGATIVE MG/DL
KETONES UR QL: NEGATIVE
LEUKOCYTE EST, POC: NEGATIVE
NITRITE UR-MCNC: NEGATIVE MG/ML
PH UR: 5 [PH] (ref 5–8)
PROT UR STRIP-MCNC: ABNORMAL MG/DL
RBC # UR STRIP: NEGATIVE /UL
SP GR UR: 1 (ref 1–1.03)
UROBILINOGEN UR QL: NORMAL

## 2024-03-11 RX ORDER — PANTOPRAZOLE SODIUM 20 MG/1
20 TABLET, DELAYED RELEASE ORAL DAILY
Qty: 30 TABLET | Refills: 1 | Status: SHIPPED | OUTPATIENT
Start: 2024-03-11 | End: 2025-03-11

## 2024-03-11 NOTE — PROGRESS NOTES
OB follow up > 20 weeks    Kylee Wells is a 28 y.o.  36w0d being seen today for her obstetrical visit.  PT reports cramping/ctx.  FM+.  Taking PNV and iron BID.  Here with significant other.       Review of Systems  No bleeding or SROM    /82   Wt 123 kg (272 lb 3.2 oz)   LMP 2023 (Exact Date) Comment: Due 2024  BMI 43.93 kg/m²     FHT: 133 BPM   Uterine Size: EFW 78%     SVE- FT/50%/-3    Assessment/Plan:    1) 28 y.o.  -pregnancy at 36w0d- GBS done today    2) ? SGA and sacral dimple- pt has seen MFM and had normal growth and normal spinal anatomy. LUAN 2024 reconfirmed.     3) Pt declined NIPT/AFP and/or Quad screen.      4) Pt declined CF/SMA/FX screening     5) H/O GDMA1 in previous pregnancy- HgBa1c 5.3, s/p early 2 hour GTT that was normal. Passed 2 hour GTT @ 28wga     6) Maternal BMI 42- will need serial growth US 28 (52.8%), 32 (54%), 36 (78%)and 39 weeks.   MFM recs:   NST weekly at 36 weeks  Growth q 4 weeks  Del 39-40 weeks     BPP  today    7) Anxiety- pt on Buspar and Prozac and reports good symptom control. She also has a counselor.     8) Anemia- HgB 10.8 (2024), on BID iron. Check CBC today      9) GERD- Pepcid BID not helping;  ERX Protonix 20 mg daily     10) Flu shot contraindicated due to Latex allergy     11) Asthma- inhaler at home, rarely needs.     12) CS-pt plans on breastfeeding.  Progesterone only methods of birth control include the progesterone only birth control pills, the Depo-Provera shot, Nexplanon arm implant, hormonal IUD such as Renee, Mirena Kyleena and Liletta as well as nonhormonal IUD such as the copper T or ParaGard. Info on Mirena given.    13) Sciatica   FMLA paperwork   Seeing PT      14) s/p tDap     15) abnormal US- AC 97%; referred to [facility]        Reviewed this stage of pregnancy  Problem list updated   RTO 1 week OBOFELIA, ARNOL Holden  3/11/2024  14:09 EDT

## 2024-03-12 LAB
ERYTHROCYTE [DISTWIDTH] IN BLOOD BY AUTOMATED COUNT: 13.7 % (ref 11.7–15.4)
HCT VFR BLD AUTO: 34.5 % (ref 34–46.6)
HGB BLD-MCNC: 11.6 G/DL (ref 11.1–15.9)
MCH RBC QN AUTO: 28.7 PG (ref 26.6–33)
MCHC RBC AUTO-ENTMCNC: 33.6 G/DL (ref 31.5–35.7)
MCV RBC AUTO: 85 FL (ref 79–97)
PLATELET # BLD AUTO: 218 X10E3/UL (ref 150–450)
RBC # BLD AUTO: 4.04 X10E6/UL (ref 3.77–5.28)
WBC # BLD AUTO: 10.7 X10E3/UL (ref 3.4–10.8)

## 2024-03-13 RX ORDER — PANTOPRAZOLE SODIUM 20 MG/1
20 TABLET, DELAYED RELEASE ORAL DAILY
Qty: 90 TABLET | OUTPATIENT
Start: 2024-03-13 | End: 2025-03-13

## 2024-03-14 LAB — B-HEM STREP SPEC QL CULT: NEGATIVE

## 2024-03-20 ENCOUNTER — ROUTINE PRENATAL (OUTPATIENT)
Dept: OBSTETRICS AND GYNECOLOGY | Facility: CLINIC | Age: 29
End: 2024-03-20
Payer: COMMERCIAL

## 2024-03-20 VITALS — SYSTOLIC BLOOD PRESSURE: 132 MMHG | DIASTOLIC BLOOD PRESSURE: 80 MMHG | BODY MASS INDEX: 44.06 KG/M2 | WEIGHT: 273 LBS

## 2024-03-20 DIAGNOSIS — Z3A.37 37 WEEKS GESTATION OF PREGNANCY: Primary | ICD-10-CM

## 2024-03-20 DIAGNOSIS — O99.210 OBESITY IN PREGNANCY, ANTEPARTUM: ICD-10-CM

## 2024-03-20 DIAGNOSIS — Z86.32 HISTORY OF GESTATIONAL DIABETES IN PRIOR PREGNANCY, CURRENTLY PREGNANT: ICD-10-CM

## 2024-03-20 DIAGNOSIS — O09.299 HISTORY OF GESTATIONAL DIABETES IN PRIOR PREGNANCY, CURRENTLY PREGNANT: ICD-10-CM

## 2024-03-20 DIAGNOSIS — Z36.9 ENCOUNTER FOR ANTENATAL SCREENING, UNSPECIFIED: ICD-10-CM

## 2024-03-20 DIAGNOSIS — M54.32 BILATERAL SCIATICA: ICD-10-CM

## 2024-03-20 DIAGNOSIS — M54.31 BILATERAL SCIATICA: ICD-10-CM

## 2024-03-20 NOTE — PROGRESS NOTES
OB follow up     Kylee Wells is a 28 y.o.  37w2d being seen today for her obstetrical visit.  Patient reports no bleeding, no contractions, and no leaking. Fetal movement: normal.  Continues to complain of bilateral sciatica at work with sitting standing or bending.  Feels better with her lying flat.  Interested in short-term disability.  Says she failed PT.  Increased BMI not helping.    Review of Systems  No bleeding, No cramping/contractions     /80   Wt 124 kg (273 lb)   LMP 2023 (Exact Date) Comment: Due 2024  BMI 44.06 kg/m²     FHT: present BPM   Uterine Size:         Assessment/Plan:    1) 28 y.o.  -pregnancy at 37w2d    2)   Encounter Diagnoses   Name Primary?    37 weeks gestation of pregnancy Yes    Encounter for  screening, unspecified     Bilateral sciatica     Obesity in pregnancy, antepartum     History of gestational diabetes in prior pregnancy, currently pregnant    Rest, ice, heat as needed.  Submit visits for evaluation for disability short-term.    3) Reviewed this stage of pregnancy  4) Problem list updated     Return in about 1 week (around 3/27/2024) for OB INT.    I spent 20 minutes caring for Kylee on this date of service. This time includes time spent by me in the following activities: preparing for the visit, reviewing tests, obtaining and/or reviewing a separately obtained history, performing a medically appropriate examination and/or evaluation, counseling and educating the patient/family/caregiver, and documenting information in the medical record.      Erasto Hopson MD    3/20/2024  15:22 EDT

## 2024-03-23 ENCOUNTER — HOSPITAL ENCOUNTER (OUTPATIENT)
Facility: HOSPITAL | Age: 29
Discharge: HOME | End: 2024-03-23
Attending: OBSTETRICS & GYNECOLOGY | Admitting: OBSTETRICS & GYNECOLOGY
Payer: COMMERCIAL

## 2024-03-23 VITALS
RESPIRATION RATE: 20 BRPM | SYSTOLIC BLOOD PRESSURE: 134 MMHG | HEIGHT: 66 IN | DIASTOLIC BLOOD PRESSURE: 71 MMHG | HEART RATE: 94 BPM | BODY MASS INDEX: 43.87 KG/M2 | WEIGHT: 273 LBS | TEMPERATURE: 98.2 F

## 2024-03-23 LAB
A1 MICROGLOB PLACENTAL VAG QL: NEGATIVE
AMPHET+METHAMPHET UR QL: NEGATIVE
AMPHETAMINES UR QL: NEGATIVE
BACTERIA UR QL AUTO: ABNORMAL /HPF
BARBITURATES UR QL SCN: NEGATIVE
BENZODIAZ UR QL SCN: NEGATIVE
BILIRUB UR QL STRIP: NEGATIVE
BUPRENORPHINE SERPL-MCNC: NEGATIVE NG/ML
CANNABINOIDS SERPL QL: NEGATIVE
CLARITY UR: CLEAR
COCAINE UR QL: NEGATIVE
COLOR UR: YELLOW
GLUCOSE UR STRIP-MCNC: NEGATIVE MG/DL
HGB UR QL STRIP.AUTO: NEGATIVE
HYALINE CASTS UR QL AUTO: ABNORMAL /LPF
KETONES UR QL STRIP: NEGATIVE
LEUKOCYTE ESTERASE UR QL STRIP.AUTO: ABNORMAL
METHADONE UR QL SCN: NEGATIVE
NITRITE UR QL STRIP: NEGATIVE
OPIATES UR QL: NEGATIVE
OXYCODONE UR QL SCN: NEGATIVE
PCP UR QL SCN: NEGATIVE
PH UR STRIP.AUTO: 7 [PH] (ref 4.5–8)
PROT UR QL STRIP: NEGATIVE
RBC # UR STRIP: ABNORMAL /HPF
REF LAB TEST METHOD: ABNORMAL
SP GR UR STRIP: 1.01 (ref 1–1.03)
SQUAMOUS #/AREA URNS HPF: ABNORMAL /HPF
TRICYCLICS UR QL SCN: NEGATIVE
UROBILINOGEN UR QL STRIP: ABNORMAL
WBC # UR STRIP: ABNORMAL /HPF

## 2024-03-23 PROCEDURE — 59025 FETAL NON-STRESS TEST: CPT | Performed by: OBSTETRICS & GYNECOLOGY

## 2024-03-23 PROCEDURE — 87086 URINE CULTURE/COLONY COUNT: CPT | Performed by: OBSTETRICS & GYNECOLOGY

## 2024-03-23 PROCEDURE — 80306 DRUG TEST PRSMV INSTRMNT: CPT | Performed by: OBSTETRICS & GYNECOLOGY

## 2024-03-23 PROCEDURE — G0463 HOSPITAL OUTPT CLINIC VISIT: HCPCS

## 2024-03-23 PROCEDURE — 84112 EVAL AMNIOTIC FLUID PROTEIN: CPT | Performed by: OBSTETRICS & GYNECOLOGY

## 2024-03-23 PROCEDURE — 81001 URINALYSIS AUTO W/SCOPE: CPT | Performed by: OBSTETRICS & GYNECOLOGY

## 2024-03-23 PROCEDURE — 59025 FETAL NON-STRESS TEST: CPT

## 2024-03-23 NOTE — NON STRESS TEST
Kylee Wells, a  at 37w5d with an LUAN of 2024, by [employer], was seen at [facility] for a nonstress test.    Chief Complaint   Patient presents with    Rupture of Membranes       Patient Active Problem List   Diagnosis    Obesity in pregnancy, antepartum    Gastroesophageal reflux disease without esophagitis    Current mild episode of major depressive disorder    History of gestational diabetes in prior pregnancy, currently pregnant    Pregnancy    Mild intermittent asthma without complication    Iron deficiency anemia    Abnormal ultrasound- small for dates; sacral abnormality- referred to [facility]- all normal.    Bilateral sciatica       Start Time  1600  Stop Time:  190    Interpretation A  Nonstress Test Interpretation A: Reactive

## 2024-03-23 NOTE — NURSING NOTE
Khushboo TORRES RN discussed & reviewed home written instructions with pt and spouse.  Verbalized understanding.  Discharged home.  Ambulatory and accompanied with spouse.

## 2024-03-25 ENCOUNTER — HOSPITAL ENCOUNTER (INPATIENT)
Facility: HOSPITAL | Age: 29
LOS: 2 days | Discharge: HOME | End: 2024-03-27
Attending: OBSTETRICS & GYNECOLOGY | Admitting: OBSTETRICS & GYNECOLOGY
Payer: COMMERCIAL

## 2024-03-25 LAB
ABO GROUP BLD: NORMAL
BACTERIA SPEC AEROBE CULT: NORMAL
BLD GP AB SCN SERPL QL: NEGATIVE
DEPRECATED RDW RBC AUTO: 46.3 FL (ref 37–54)
ERYTHROCYTE [DISTWIDTH] IN BLOOD BY AUTOMATED COUNT: 14.2 % (ref 12.3–15.4)
HCT VFR BLD AUTO: 34.1 % (ref 34–46.6)
HGB BLD-MCNC: 11 G/DL (ref 12–15.9)
MCH RBC QN AUTO: 28.6 PG (ref 26.6–33)
MCHC RBC AUTO-ENTMCNC: 32.3 G/DL (ref 31.5–35.7)
MCV RBC AUTO: 88.6 FL (ref 79–97)
PLATELET # BLD AUTO: 193 10*3/MM3 (ref 140–450)
PMV BLD AUTO: 10.3 FL (ref 6–12)
RBC # BLD AUTO: 3.85 10*6/MM3 (ref 3.77–5.28)
RH BLD: POSITIVE
T PALLIDUM IGG SER QL: NORMAL
T&S EXPIRATION DATE: NORMAL
WBC NRBC COR # BLD AUTO: 10.05 10*3/MM3 (ref 3.4–10.8)

## 2024-03-25 PROCEDURE — 86850 RBC ANTIBODY SCREEN: CPT | Performed by: OBSTETRICS & GYNECOLOGY

## 2024-03-25 PROCEDURE — 88307 TISSUE EXAM BY PATHOLOGIST: CPT

## 2024-03-25 PROCEDURE — 25810000003 LACTATED RINGERS PER 1000 ML: Performed by: OBSTETRICS & GYNECOLOGY

## 2024-03-25 PROCEDURE — 86900 BLOOD TYPING SEROLOGIC ABO: CPT | Performed by: OBSTETRICS & GYNECOLOGY

## 2024-03-25 PROCEDURE — 86901 BLOOD TYPING SEROLOGIC RH(D): CPT | Performed by: OBSTETRICS & GYNECOLOGY

## 2024-03-25 PROCEDURE — 85027 COMPLETE CBC AUTOMATED: CPT | Performed by: OBSTETRICS & GYNECOLOGY

## 2024-03-25 PROCEDURE — S0260 H&P FOR SURGERY: HCPCS | Performed by: OBSTETRICS & GYNECOLOGY

## 2024-03-25 PROCEDURE — 59410 OBSTETRICAL CARE: CPT | Performed by: OBSTETRICS & GYNECOLOGY

## 2024-03-25 PROCEDURE — 86780 TREPONEMA PALLIDUM: CPT | Performed by: OBSTETRICS & GYNECOLOGY

## 2024-03-25 RX ORDER — PROMETHAZINE HYDROCHLORIDE 25 MG/1
12.5 SUPPOSITORY RECTAL EVERY 6 HOURS PRN
Status: DISCONTINUED | OUTPATIENT
Start: 2024-03-25 | End: 2024-03-27 | Stop reason: HOSPADM

## 2024-03-25 RX ORDER — ACETAMINOPHEN 325 MG/1
650 TABLET ORAL EVERY 6 HOURS PRN
Status: DISCONTINUED | OUTPATIENT
Start: 2024-03-25 | End: 2024-03-27 | Stop reason: HOSPADM

## 2024-03-25 RX ORDER — SODIUM CHLORIDE 0.9 % (FLUSH) 0.9 %
1-10 SYRINGE (ML) INJECTION AS NEEDED
Status: DISCONTINUED | OUTPATIENT
Start: 2024-03-25 | End: 2024-03-27 | Stop reason: HOSPADM

## 2024-03-25 RX ORDER — HYDROCODONE BITARTRATE AND ACETAMINOPHEN 10; 325 MG/1; MG/1
1 TABLET ORAL EVERY 4 HOURS PRN
Status: DISCONTINUED | OUTPATIENT
Start: 2024-03-25 | End: 2024-03-27 | Stop reason: HOSPADM

## 2024-03-25 RX ORDER — OXYTOCIN/0.9 % SODIUM CHLORIDE 30/500 ML
125 PLASTIC BAG, INJECTION (ML) INTRAVENOUS ONCE AS NEEDED
Status: DISCONTINUED | OUTPATIENT
Start: 2024-03-25 | End: 2024-03-27 | Stop reason: HOSPADM

## 2024-03-25 RX ORDER — OXYTOCIN/0.9 % SODIUM CHLORIDE 30/500 ML
250 PLASTIC BAG, INJECTION (ML) INTRAVENOUS CONTINUOUS
Status: ACTIVE | OUTPATIENT
Start: 2024-03-25 | End: 2024-03-25

## 2024-03-25 RX ORDER — MISOPROSTOL 200 UG/1
800 TABLET ORAL ONCE AS NEEDED
Status: COMPLETED | OUTPATIENT
Start: 2024-03-25 | End: 2024-03-25

## 2024-03-25 RX ORDER — ONDANSETRON 2 MG/ML
4 INJECTION INTRAMUSCULAR; INTRAVENOUS EVERY 6 HOURS PRN
Status: DISCONTINUED | OUTPATIENT
Start: 2024-03-25 | End: 2024-03-27 | Stop reason: HOSPADM

## 2024-03-25 RX ORDER — OXYTOCIN/0.9 % SODIUM CHLORIDE 30/500 ML
999 PLASTIC BAG, INJECTION (ML) INTRAVENOUS ONCE
Status: COMPLETED | OUTPATIENT
Start: 2024-03-25 | End: 2024-03-25

## 2024-03-25 RX ORDER — HYDROCODONE BITARTRATE AND ACETAMINOPHEN 5; 325 MG/1; MG/1
1 TABLET ORAL EVERY 4 HOURS PRN
Status: DISCONTINUED | OUTPATIENT
Start: 2024-03-25 | End: 2024-03-27 | Stop reason: HOSPADM

## 2024-03-25 RX ORDER — PANTOPRAZOLE SODIUM 20 MG/1
20 TABLET, DELAYED RELEASE ORAL DAILY
Status: DISCONTINUED | OUTPATIENT
Start: 2024-03-25 | End: 2024-03-27 | Stop reason: CLARIF

## 2024-03-25 RX ORDER — ONDANSETRON 4 MG/1
4 TABLET, ORALLY DISINTEGRATING ORAL EVERY 6 HOURS PRN
Status: DISCONTINUED | OUTPATIENT
Start: 2024-03-25 | End: 2024-03-27 | Stop reason: HOSPADM

## 2024-03-25 RX ORDER — HYDROCORTISONE 25 MG/G
1 CREAM TOPICAL AS NEEDED
Status: DISCONTINUED | OUTPATIENT
Start: 2024-03-25 | End: 2024-03-27 | Stop reason: HOSPADM

## 2024-03-25 RX ORDER — LIDOCAINE HYDROCHLORIDE 10 MG/ML
0.5 INJECTION, SOLUTION INFILTRATION; PERINEURAL ONCE AS NEEDED
Status: DISCONTINUED | OUTPATIENT
Start: 2024-03-25 | End: 2024-03-27 | Stop reason: HOSPADM

## 2024-03-25 RX ORDER — SODIUM CHLORIDE, SODIUM LACTATE, POTASSIUM CHLORIDE, CALCIUM CHLORIDE 600; 310; 30; 20 MG/100ML; MG/100ML; MG/100ML; MG/100ML
125 INJECTION, SOLUTION INTRAVENOUS CONTINUOUS
Status: DISCONTINUED | OUTPATIENT
Start: 2024-03-25 | End: 2024-03-27 | Stop reason: HOSPADM

## 2024-03-25 RX ORDER — SODIUM CHLORIDE 0.9 % (FLUSH) 0.9 %
10 SYRINGE (ML) INJECTION AS NEEDED
Status: DISCONTINUED | OUTPATIENT
Start: 2024-03-25 | End: 2024-03-27 | Stop reason: HOSPADM

## 2024-03-25 RX ORDER — MAGNESIUM CARB/ALUMINUM HYDROX 105-160MG
30 TABLET,CHEWABLE ORAL ONCE
Status: DISCONTINUED | OUTPATIENT
Start: 2024-03-25 | End: 2024-03-27 | Stop reason: HOSPADM

## 2024-03-25 RX ORDER — BISACODYL 10 MG
10 SUPPOSITORY, RECTAL RECTAL DAILY PRN
Status: DISCONTINUED | OUTPATIENT
Start: 2024-03-26 | End: 2024-03-27 | Stop reason: HOSPADM

## 2024-03-25 RX ORDER — CARBOPROST TROMETHAMINE 250 UG/ML
250 INJECTION, SOLUTION INTRAMUSCULAR
Status: DISCONTINUED | OUTPATIENT
Start: 2024-03-25 | End: 2024-03-27 | Stop reason: HOSPADM

## 2024-03-25 RX ORDER — IBUPROFEN 600 MG/1
600 TABLET ORAL EVERY 6 HOURS PRN
Status: DISCONTINUED | OUTPATIENT
Start: 2024-03-25 | End: 2024-03-27 | Stop reason: HOSPADM

## 2024-03-25 RX ORDER — PROMETHAZINE HYDROCHLORIDE 25 MG/1
25 TABLET ORAL EVERY 6 HOURS PRN
Status: DISCONTINUED | OUTPATIENT
Start: 2024-03-25 | End: 2024-03-27 | Stop reason: HOSPADM

## 2024-03-25 RX ORDER — SODIUM CHLORIDE 9 MG/ML
40 INJECTION, SOLUTION INTRAVENOUS AS NEEDED
Status: DISCONTINUED | OUTPATIENT
Start: 2024-03-25 | End: 2024-03-27 | Stop reason: HOSPADM

## 2024-03-25 RX ORDER — FERROUS GLUCONATE 324(37.5)
324 TABLET ORAL 2 TIMES DAILY WITH MEALS
Status: DISCONTINUED | OUTPATIENT
Start: 2024-03-25 | End: 2024-03-27 | Stop reason: HOSPADM

## 2024-03-25 RX ORDER — ACETAMINOPHEN 325 MG/1
650 TABLET ORAL EVERY 4 HOURS PRN
Status: DISCONTINUED | OUTPATIENT
Start: 2024-03-25 | End: 2024-03-25 | Stop reason: SDUPTHER

## 2024-03-25 RX ORDER — DOCUSATE SODIUM 100 MG/1
100 CAPSULE, LIQUID FILLED ORAL 2 TIMES DAILY
Status: DISCONTINUED | OUTPATIENT
Start: 2024-03-25 | End: 2024-03-27 | Stop reason: HOSPADM

## 2024-03-25 RX ORDER — MISOPROSTOL 200 UG/1
TABLET ORAL
Status: COMPLETED
Start: 2024-03-25 | End: 2024-03-25

## 2024-03-25 RX ORDER — FLUOXETINE HYDROCHLORIDE 20 MG/1
40 CAPSULE ORAL EVERY MORNING
Status: DISCONTINUED | OUTPATIENT
Start: 2024-03-25 | End: 2024-03-27 | Stop reason: HOSPADM

## 2024-03-25 RX ORDER — METHYLERGONOVINE MALEATE 0.2 MG/ML
200 INJECTION INTRAVENOUS ONCE AS NEEDED
Status: DISCONTINUED | OUTPATIENT
Start: 2024-03-25 | End: 2024-03-27 | Stop reason: HOSPADM

## 2024-03-25 RX ORDER — PRENATAL VIT/IRON FUM/FOLIC AC 27MG-0.8MG
1 TABLET ORAL DAILY
Status: DISCONTINUED | OUTPATIENT
Start: 2024-03-25 | End: 2024-03-27 | Stop reason: HOSPADM

## 2024-03-25 RX ORDER — SODIUM CHLORIDE 0.9 % (FLUSH) 0.9 %
10 SYRINGE (ML) INJECTION EVERY 12 HOURS SCHEDULED
Status: DISCONTINUED | OUTPATIENT
Start: 2024-03-25 | End: 2024-03-27 | Stop reason: HOSPADM

## 2024-03-25 RX ORDER — OXYTOCIN/0.9 % SODIUM CHLORIDE 30/500 ML
PLASTIC BAG, INJECTION (ML) INTRAVENOUS
Status: COMPLETED
Start: 2024-03-25 | End: 2024-03-25

## 2024-03-25 RX ORDER — BUSPIRONE HYDROCHLORIDE 5 MG/1
10 TABLET ORAL EVERY 12 HOURS SCHEDULED
Status: DISCONTINUED | OUTPATIENT
Start: 2024-03-25 | End: 2024-03-27 | Stop reason: HOSPADM

## 2024-03-25 RX ORDER — CETIRIZINE HYDROCHLORIDE 10 MG/1
10 TABLET ORAL EVERY 24 HOURS
Status: DISCONTINUED | OUTPATIENT
Start: 2024-03-25 | End: 2024-03-27 | Stop reason: HOSPADM

## 2024-03-25 RX ADMIN — Medication 10 ML: at 14:55

## 2024-03-25 RX ADMIN — MISOPROSTOL 800 MCG: 200 TABLET ORAL at 10:14

## 2024-03-25 RX ADMIN — HYDROCODONE BITARTRATE AND ACETAMINOPHEN 1 TABLET: 5; 325 TABLET ORAL at 15:05

## 2024-03-25 RX ADMIN — BUSPIRONE HYDROCHLORIDE 10 MG: 5 TABLET ORAL at 21:06

## 2024-03-25 RX ADMIN — FLUOXETINE HYDROCHLORIDE 40 MG: 20 CAPSULE ORAL at 12:00

## 2024-03-25 RX ADMIN — SODIUM CHLORIDE, POTASSIUM CHLORIDE, SODIUM LACTATE AND CALCIUM CHLORIDE 125 ML/HR: 600; 310; 30; 20 INJECTION, SOLUTION INTRAVENOUS at 10:45

## 2024-03-25 RX ADMIN — Medication 324 MG: at 19:28

## 2024-03-25 RX ADMIN — CETIRIZINE HYDROCHLORIDE 10 MG: 10 TABLET, FILM COATED ORAL at 12:00

## 2024-03-25 RX ADMIN — OXYTOCIN-SODIUM CHLORIDE 0.9% IV SOLN 30 UNIT/500ML 999 ML/HR: 30-0.9/5 SOLUTION at 10:03

## 2024-03-25 RX ADMIN — IBUPROFEN 600 MG: 600 TABLET, FILM COATED ORAL at 12:00

## 2024-03-25 RX ADMIN — DOCUSATE SODIUM 100 MG: 100 CAPSULE, LIQUID FILLED ORAL at 21:06

## 2024-03-25 RX ADMIN — Medication 324 MG: at 12:00

## 2024-03-25 RX ADMIN — IBUPROFEN 600 MG: 600 TABLET, FILM COATED ORAL at 19:33

## 2024-03-25 RX ADMIN — Medication 999 ML/HR: at 10:03

## 2024-03-25 RX ADMIN — PRENATAL VIT W/ FE FUMARATE-FA TAB 27-0.8 MG 1 TABLET: 27-0.8 TAB at 12:00

## 2024-03-25 RX ADMIN — DOCUSATE SODIUM 100 MG: 100 CAPSULE, LIQUID FILLED ORAL at 12:00

## 2024-03-25 NOTE — H&P
28-year-old  4 para 2-0-1-2 at 30 8/7 weeks presents to triage and active labor.  She had spontaneous rupture membranes at home was completely dilated.  She had 2 previous vaginal births.  Prenatal care was complicated by obesity, history of gestational diabetes in her previous pregnancy, anemia and bilateral sciatica.  Fetal heart tones were present and she was completely dilated.  The delivery pack was opened.  She is GBS negative    Erasto Hopson MD

## 2024-03-25 NOTE — PLAN OF CARE
Goal Outcome Evaluation:  Plan of Care Reviewed With: patient, significant other        Progress: improving  Outcome Evaluation: VSS, Pain controlled with PO PRN medication,  + Void with pericare done, FF @ U with light lochia Breastfeeding baby, Bonding appropriately with baby, Supportive spouse @ BS.

## 2024-03-25 NOTE — L&D DELIVERY NOTE
[facility]   Vaginal Delivery Note    Patient Name: Kylee Wells  : 1995  MRN: [id number]    Date of Delivery: 3/25/2024     Diagnosis     Pre & Post-Delivery:  Intrauterine pregnancy at 38w0d  Labor status: Spontaneous Onset of Labor     * No active hospital problems. *             Problem List    Transfer to Postpartum     Review the Delivery Report for details.     Delivery     Delivery: Vaginal, Spontaneous     YOB: 2022    Time of Birth:  Gestational Age 9:58 AM   38w0d     Anesthesia: None     Delivering clinician: Erasto Carpio    Forceps?   No   Vacuum? No    Shoulder dystocia present: No        Delivery narrative: 28-year-old  4 para 2-0-1-2 presented to triage at 38-0/7 weeks complaining of spontaneous rupture of membranes and onset of labor.  She was grossly ruptured and 10 cm dilated.  Prenatal care was complicated by increased BMI, history of gestational diabetes in her previous pregnancy, iron deficiency anemia and bilateral sciatica.  She was GBS negative.  IV access was obtained and the delivery pack was opened.  The patient pushed twice and the infant was delivered onto the bed.  The infant had a strong cry, good tone and facial grimace to bulb suction.  Cord was clamped and cut and the infant was placed on maternal chest for Kangaroo care.  Cord blood was obtained.  The placenta was intact with three-vessel cord.  There were no lacerations.  Sponge, needle Counts are correct and the patient was stable at the end of the procedure.      Infant     Findings: female  infant     Infant observations: Weight: No birth weight on file.   Length:   in  Observations/Comments:        Apgars: 8  @ 1 minute /    9  @ 5 minutes   Infant Name: unknown     Placenta & Cord         Placenta delivered  Spontaneous  at   3/25/2024 10:05 AM     Cord: 3 vessels  present.   Nuchal Cord?  no   Cord blood obtained: Yes    Cord gases obtained:  No    Cord gas results:  "Venous:  No results found for: \"PHCVEN\", \"BECVEN\"    Arterial:  No results found for: \"PHCART\", \"BECART\"     Repair     Episiotomy: None     No    Lacerations: No   Estimated Blood Loss:  200 cc     Quantitative Blood Loss:          Complications     none    Disposition     Mother to Mother Baby/Postpartum  in stable condition currently.  Baby to NBN  in stable condition currently.    Erasto Hopson MD  03/25/24  11:31 EDT        "

## 2024-03-26 LAB
AMPHET+METHAMPHET UR QL: NEGATIVE
AMPHETAMINES UR QL: NEGATIVE
BARBITURATES UR QL SCN: NEGATIVE
BASOPHILS # BLD AUTO: 0.01 10*3/MM3 (ref 0–0.2)
BASOPHILS NFR BLD AUTO: 0.1 % (ref 0–1.5)
BENZODIAZ UR QL SCN: NEGATIVE
BUPRENORPHINE SERPL-MCNC: NEGATIVE NG/ML
CANNABINOIDS SERPL QL: NEGATIVE
COCAINE UR QL: NEGATIVE
DEPRECATED RDW RBC AUTO: 45.6 FL (ref 37–54)
EOSINOPHIL # BLD AUTO: 0.09 10*3/MM3 (ref 0–0.4)
EOSINOPHIL NFR BLD AUTO: 0.7 % (ref 0.3–6.2)
ERYTHROCYTE [DISTWIDTH] IN BLOOD BY AUTOMATED COUNT: 14.4 % (ref 12.3–15.4)
HCT VFR BLD AUTO: 33.4 % (ref 34–46.6)
HGB BLD-MCNC: 10.9 G/DL (ref 12–15.9)
IMM GRANULOCYTES # BLD AUTO: 0.1 10*3/MM3 (ref 0–0.05)
IMM GRANULOCYTES NFR BLD AUTO: 0.8 % (ref 0–0.5)
LYMPHOCYTES # BLD AUTO: 2.99 10*3/MM3 (ref 0.7–3.1)
LYMPHOCYTES NFR BLD AUTO: 22.9 % (ref 19.6–45.3)
MCH RBC QN AUTO: 28.7 PG (ref 26.6–33)
MCHC RBC AUTO-ENTMCNC: 32.6 G/DL (ref 31.5–35.7)
MCV RBC AUTO: 87.9 FL (ref 79–97)
METHADONE UR QL SCN: NEGATIVE
MONOCYTES # BLD AUTO: 0.73 10*3/MM3 (ref 0.1–0.9)
MONOCYTES NFR BLD AUTO: 5.6 % (ref 5–12)
NEUTROPHILS NFR BLD AUTO: 69.9 % (ref 42.7–76)
NEUTROPHILS NFR BLD AUTO: 9.12 10*3/MM3 (ref 1.7–7)
NRBC BLD AUTO-RTO: 0 /100 WBC (ref 0–0.2)
OPIATES UR QL: NEGATIVE
OXYCODONE UR QL SCN: NEGATIVE
PCP UR QL SCN: NEGATIVE
PLATELET # BLD AUTO: 199 10*3/MM3 (ref 140–450)
PMV BLD AUTO: 10.5 FL (ref 6–12)
RBC # BLD AUTO: 3.8 10*6/MM3 (ref 3.77–5.28)
TRICYCLICS UR QL SCN: NEGATIVE
WBC NRBC COR # BLD AUTO: 13.04 10*3/MM3 (ref 3.4–10.8)

## 2024-03-26 PROCEDURE — 80306 DRUG TEST PRSMV INSTRMNT: CPT | Performed by: OBSTETRICS & GYNECOLOGY

## 2024-03-26 PROCEDURE — 85025 COMPLETE CBC W/AUTO DIFF WBC: CPT | Performed by: OBSTETRICS & GYNECOLOGY

## 2024-03-26 PROCEDURE — 0503F POSTPARTUM CARE VISIT: CPT | Performed by: NURSE PRACTITIONER

## 2024-03-26 RX ORDER — IBUPROFEN 600 MG/1
600 TABLET ORAL EVERY 6 HOURS PRN
Qty: 30 TABLET | Refills: 0 | Status: SHIPPED | OUTPATIENT
Start: 2024-03-26

## 2024-03-26 RX ORDER — PSEUDOEPHEDRINE HCL 30 MG
100 TABLET ORAL 2 TIMES DAILY
Qty: 60 CAPSULE | Refills: 0 | Status: SHIPPED | OUTPATIENT
Start: 2024-03-26

## 2024-03-26 RX ADMIN — DOCUSATE SODIUM 100 MG: 100 CAPSULE, LIQUID FILLED ORAL at 07:30

## 2024-03-26 RX ADMIN — Medication 324 MG: at 07:29

## 2024-03-26 RX ADMIN — BUSPIRONE HYDROCHLORIDE 10 MG: 5 TABLET ORAL at 08:05

## 2024-03-26 RX ADMIN — PRENATAL VIT W/ FE FUMARATE-FA TAB 27-0.8 MG 1 TABLET: 27-0.8 TAB at 07:30

## 2024-03-26 RX ADMIN — IBUPROFEN 600 MG: 600 TABLET, FILM COATED ORAL at 02:08

## 2024-03-26 RX ADMIN — ACETAMINOPHEN 650 MG: 325 TABLET, FILM COATED ORAL at 15:57

## 2024-03-26 RX ADMIN — FLUOXETINE HYDROCHLORIDE 40 MG: 20 CAPSULE ORAL at 07:30

## 2024-03-26 RX ADMIN — IBUPROFEN 600 MG: 600 TABLET, FILM COATED ORAL at 07:44

## 2024-03-26 RX ADMIN — CETIRIZINE HYDROCHLORIDE 10 MG: 10 TABLET, FILM COATED ORAL at 14:17

## 2024-03-26 RX ADMIN — ACETAMINOPHEN 650 MG: 325 TABLET, FILM COATED ORAL at 07:44

## 2024-03-26 NOTE — DISCHARGE SUMMARY
"Obstetrical Discharge Form    Primary OB Clinician: SYMONE      Gestational Age: 38.0 weeks     Antepartum complications: GERD, Depression, anemia, sciatica    Date of Delivery:  3/25/2024     Delivered By: Erasto Carpio     Delivery Type: spontaneous vaginal delivery    Tubal Ligation: n/a    Baby: Liveborn female, Apgars 8/9, weight 8 #, 2.8 oz,     Anesthesia: none    Intrapartum complications: none    Laceration: none      Feeding method: breast and bottle      Discharge Date: 3/26/2024; Discharge Time: 16:22 EDT    /67 (BP Location: Right arm, Patient Position: Sitting)   Pulse 78   Temp 97.9 °F (36.6 °C) (Oral)   Resp 18   Ht 167.6 cm (66\")   Wt 124 kg (273 lb)   LMP 2023 (Exact Date) Comment: Due 2024  SpO2 97%   Breastfeeding Yes   BMI 44.06 kg/m²      Abd: soft, fundus firm  Ext: trace edema, neg Anshul's sign, no cords  Results from last 7 days   Lab Units 24  0516   HEMOGLOBIN g/dL 10.9*       Impression: 1) Postpartum day #1- S/P . Requesting early discharge. Rh +.     2) Postpartum anemia- Continue iron.     3) Female infant- Breast and bottle.     4) H/O Depression- Taking Prozac and Buspar.     Plan:    Patient given written instruction sheet.  Follow-up appointment with [facility] in 6 weeks.    ARNOL Castellanos  16:22 EDT  3/26/2024  "

## 2024-03-26 NOTE — PROGRESS NOTES
"KATH[facility] Eng  Vaginal Delivery Progress Note    Subjective   Subjective  Postpartum Day 1: Vaginal Delivery    The patient feels well.  Her pain is well controlled with nonsteroidal anti-inflammatory drugs.   She is ambulating well.  Patient describes her bleeding as thin lochia.    Breastfeeding: infant latching without difficulty without pain.    Objective     Objective   Vital Signs Range for the last 24 hours  Temperature: Temp:  [97.6 °F (36.4 °C)-98.9 °F (37.2 °C)] 98 °F (36.7 °C)   Temp Source: Temp src: Oral   BP: BP: (119-152)/(55-76) 131/67   Pulse: Heart Rate:  [74-87] 85   Respirations: Resp:  [16-18] 16   SPO2: SpO2:  [97 %-99 %] 98 %   O2 Amount (l/min):     O2 Devices Device (Oxygen Therapy): room air   Weight: Weight:  [124 kg (273 lb)] 124 kg (273 lb)     Admit Height:  Height: 167.6 cm (66\")    Physical Exam:  General:  no acute distresss.  Abdomen: Fundus: appropriate, firm, non tender  Extremities: normal, atraumatic, no cyanosis, and trace edema. Neg Anshul's sign. No cords.       Lab results reviewed:  Yes   Rubella:  No results found for: \"RUBELLAIGGIN\" Nurse Transcribed from prenatal record --    Rubella Antibodies, IgG   Date Value Ref Range Status   09/18/2023 1.14 Immune >0.99 index Final     Comment:                                     Non-immune       <0.90                                  Equivocal  0.90 - 0.99                                  Immune           >0.99       Rh Status:    RH type   Date Value Ref Range Status   03/25/2024 Positive  Final     Rh Factor   Date Value Ref Range Status   09/18/2023 Positive  Final     Comment:     Please note: Prior records for this patient's ABO / Rh type are not  available for additional verification.       Immunizations:   Immunization History   Administered Date(s) Administered    COVID-19 ([employer]) 06/17/2021    DTaP, Unspecified 1997, 1997, 1997, 08/26/1998    HPV Quadrivalent 07/24/2009    Hep B, Adolescent or Pediatric " 1997    HiB 1997, 1997, 1998    IPV 1997, 1997    Influenza LAIV ([facility]) 10/17/2008    Influenza TIV (IM) 2007, 09/15/2009, 2010    MMR 1998    Meningococcal MCV4P (Menactra) 2009    Tdap 2008, 2018, 2022, 2024    Varicella 1998       Assessment & Plan   Assessment & Plan    * No active hospital problems. *      Kylee TWIN Wells is Day 1  post-partum  Vaginal, Spontaneous   .      Plan:  1) Postpartum day #1- S/P . Rh +. Hgb 10.9g/dL.    2) Postpartum care- Advance.     3) Female- Breast and bottle     4) Postpartum anemia- Continue iron.     5) Dispo- Plan home tomorrow      Jinny Meade, ARNOL  3/26/2024  08:13 EDT

## 2024-03-26 NOTE — PLAN OF CARE
Problem: Adult Inpatient Plan of Care  Goal: Plan of Care Review  Outcome: Met  Goal: Patient-Specific Goal (Individualized)  Outcome: Met  Goal: Absence of Hospital-Acquired Illness or Injury  Outcome: Met  Intervention: Identify and Manage Fall Risk  Recent Flowsheet Documentation  Taken 3/26/2024 1519 by Christin Babin RN  Safety Promotion/Fall Prevention: safety round/check completed  Taken 3/26/2024 0744 by Christin Babin RN  Safety Promotion/Fall Prevention: safety round/check completed  Intervention: Prevent and Manage VTE (Venous Thromboembolism) Risk  Recent Flowsheet Documentation  Taken 3/26/2024 1519 by Christin Babin RN  Activity Management: up ad darin  Taken 3/26/2024 0744 by Christin Babin RN  Activity Management: up ad darin  Goal: Optimal Comfort and Wellbeing  Outcome: Met  Intervention: Monitor Pain and Promote Comfort  Recent Flowsheet Documentation  Taken 3/26/2024 0744 by Christin Babin RN  Pain Management Interventions: see MAR  Intervention: Provide Person-Centered Care  Recent Flowsheet Documentation  Taken 3/26/2024 0744 by Christin Babin RN  Trust Relationship/Rapport:   care explained   choices provided  Goal: Readiness for Transition of Care  Outcome: Met   Goal Outcome Evaluation:

## 2024-03-27 VITALS
DIASTOLIC BLOOD PRESSURE: 76 MMHG | SYSTOLIC BLOOD PRESSURE: 134 MMHG | HEIGHT: 66 IN | RESPIRATION RATE: 18 BRPM | WEIGHT: 273 LBS | OXYGEN SATURATION: 97 % | TEMPERATURE: 97.9 F | BODY MASS INDEX: 43.87 KG/M2 | HEART RATE: 79 BPM

## 2024-03-27 RX ORDER — PANTOPRAZOLE SODIUM 40 MG/1
40 TABLET, DELAYED RELEASE ORAL
Status: DISCONTINUED | OUTPATIENT
Start: 2024-03-27 | End: 2024-03-27

## 2024-03-27 RX ORDER — PANTOPRAZOLE SODIUM 20 MG/1
20 TABLET, DELAYED RELEASE ORAL
Qty: 351 TABLET | Refills: 0 | Status: DISCONTINUED | OUTPATIENT
Start: 2024-03-27 | End: 2024-03-27 | Stop reason: HOSPADM

## 2024-03-27 RX ADMIN — Medication 324 MG: at 08:17

## 2024-03-27 RX ADMIN — WITCH HAZEL 1 PAD: 500 SOLUTION RECTAL; TOPICAL at 01:46

## 2024-03-27 RX ADMIN — BUSPIRONE HYDROCHLORIDE 10 MG: 5 TABLET ORAL at 08:17

## 2024-03-27 RX ADMIN — IBUPROFEN 600 MG: 600 TABLET, FILM COATED ORAL at 01:46

## 2024-03-27 RX ADMIN — PRENATAL VIT W/ FE FUMARATE-FA TAB 27-0.8 MG 1 TABLET: 27-0.8 TAB at 08:17

## 2024-03-27 RX ADMIN — CETIRIZINE HYDROCHLORIDE 10 MG: 10 TABLET, FILM COATED ORAL at 12:50

## 2024-03-27 RX ADMIN — ACETAMINOPHEN 650 MG: 325 TABLET, FILM COATED ORAL at 12:50

## 2024-03-27 RX ADMIN — PANTOPRAZOLE SODIUM 20 MG: 20 TABLET, DELAYED RELEASE ORAL at 08:18

## 2024-03-27 RX ADMIN — HYDROCODONE BITARTRATE AND ACETAMINOPHEN 1 TABLET: 5; 325 TABLET ORAL at 06:01

## 2024-03-27 RX ADMIN — BUSPIRONE HYDROCHLORIDE 10 MG: 5 TABLET ORAL at 01:46

## 2024-03-27 RX ADMIN — ACETAMINOPHEN 650 MG: 325 TABLET, FILM COATED ORAL at 04:27

## 2024-03-27 RX ADMIN — FLUOXETINE HYDROCHLORIDE 40 MG: 20 CAPSULE ORAL at 08:17

## 2024-03-27 RX ADMIN — DOCUSATE SODIUM 100 MG: 100 CAPSULE, LIQUID FILLED ORAL at 08:17

## 2024-03-27 RX ADMIN — IBUPROFEN 600 MG: 600 TABLET, FILM COATED ORAL at 12:50

## 2024-03-27 NOTE — NURSING NOTE
[employer] notified via toll free number of mother's positive Prenatal UDS. Report # [id number] taken by employee ID# [id number].

## 2024-03-27 NOTE — PLAN OF CARE
Goal Outcome Evaluation:   VSS, pt taking motrin and tylenol for mild discomfort, states current meds are effective, breast feeding and caring for  appropriately. Spouse at bedside and supportive.

## 2024-03-27 NOTE — CASE MANAGEMENT/SOCIAL WORK
Case Management Discharge Note      Final Note: Discharged home.         Selected Continued Care - Discharged on 3/27/2024 Admission date: 3/25/2024 - Discharge disposition: Home or Self Care      Destination    No services have been selected for the patient.                Durable Medical Equipment    No services have been selected for the patient.                Dialysis/Infusion    No services have been selected for the patient.                Home Medical Care    No services have been selected for the patient.                Therapy    No services have been selected for the patient.                Community Resources    No services have been selected for the patient.                Community & DME    No services have been selected for the patient.                         Final Discharge Disposition Code: 01 - home or self-care

## 2024-03-28 LAB
LAB AP CASE REPORT: NORMAL
PATH REPORT.FINAL DX SPEC: NORMAL

## 2024-04-05 ENCOUNTER — MATERNAL SCREENING (OUTPATIENT)
Dept: CALL CENTER | Facility: HOSPITAL | Age: 29
End: 2024-04-05
Payer: COMMERCIAL

## 2024-04-05 NOTE — OUTREACH NOTE
Maternal Screening Survey      Flowsheet Row Responses   Facility patient discharged from? [facility]   Attempt successful? No   Unsuccessful attempts Attempt 1              Parvin MARINELLI - Registered Nurse

## 2024-04-05 NOTE — OUTREACH NOTE
Maternal Screening Survey      Flowsheet Row Responses   Facility patient discharged from? [facility]   Attempt successful? No   Unsuccessful attempts Attempt 2              Parvin MARINELIL - Registered Nurse

## 2024-04-06 ENCOUNTER — MATERNAL SCREENING (OUTPATIENT)
Dept: CALL CENTER | Facility: HOSPITAL | Age: 29
End: 2024-04-06
Payer: COMMERCIAL

## 2024-04-06 NOTE — OUTREACH NOTE
Maternal Screening Survey      Flowsheet Row Responses   Facility patient discharged from? [facility]   Attempt successful? Yes   Call start time 1212   Revoke Decline to participate              TIEN LAWSON - Registered Nurse

## 2024-05-02 RX ORDER — FAMOTIDINE 40 MG/1
40 TABLET, FILM COATED ORAL 2 TIMES DAILY
Qty: 60 TABLET | Refills: 6 | OUTPATIENT
Start: 2024-05-02

## 2024-05-08 ENCOUNTER — POSTPARTUM VISIT (OUTPATIENT)
Dept: OBSTETRICS AND GYNECOLOGY | Facility: CLINIC | Age: 29
End: 2024-05-08
Payer: COMMERCIAL

## 2024-05-08 VITALS
WEIGHT: 261 LBS | HEIGHT: 66 IN | DIASTOLIC BLOOD PRESSURE: 62 MMHG | SYSTOLIC BLOOD PRESSURE: 94 MMHG | BODY MASS INDEX: 41.95 KG/M2

## 2024-05-08 DIAGNOSIS — Z13.89 SCREENING FOR GENITOURINARY CONDITION: ICD-10-CM

## 2024-05-08 LAB
BILIRUB BLD-MCNC: NEGATIVE MG/DL
CLARITY, POC: CLEAR
COLOR UR: YELLOW
GLUCOSE UR STRIP-MCNC: NEGATIVE MG/DL
KETONES UR QL: NEGATIVE
LEUKOCYTE EST, POC: NEGATIVE
NITRITE UR-MCNC: NEGATIVE MG/ML
PH UR: 9 [PH] (ref 5–8)
PROT UR STRIP-MCNC: ABNORMAL MG/DL
RBC # UR STRIP: ABNORMAL /UL
SP GR UR: 1 (ref 1–1.03)
UROBILINOGEN UR QL: NORMAL

## 2024-05-09 ENCOUNTER — TELEPHONE (OUTPATIENT)
Dept: OBSTETRICS AND GYNECOLOGY | Facility: CLINIC | Age: 29
End: 2024-05-09
Payer: COMMERCIAL

## 2024-05-17 ENCOUNTER — TELEMEDICINE (OUTPATIENT)
Dept: FAMILY MEDICINE CLINIC | Facility: TELEHEALTH | Age: 29
End: 2024-05-17
Payer: COMMERCIAL

## 2024-05-17 DIAGNOSIS — R05.1 ACUTE COUGH: ICD-10-CM

## 2024-05-17 DIAGNOSIS — J01.00 ACUTE NON-RECURRENT MAXILLARY SINUSITIS: Primary | ICD-10-CM

## 2024-05-17 DIAGNOSIS — Z87.09 HISTORY OF ASTHMA: ICD-10-CM

## 2024-05-17 PROBLEM — N39.3 STRESS INCONTINENCE OF URINE: Status: ACTIVE | Noted: 2018-05-07

## 2024-05-17 PROBLEM — N80.9 ENDOMETRIOSIS: Status: ACTIVE | Noted: 2018-02-28

## 2024-05-17 PROBLEM — G44.209 TENSION-TYPE HEADACHE: Status: ACTIVE | Noted: 2018-02-28

## 2024-05-17 RX ORDER — METHYLPREDNISOLONE 4 MG/1
TABLET ORAL
Qty: 21 TABLET | Refills: 0 | Status: SHIPPED | OUTPATIENT
Start: 2024-05-17

## 2024-05-17 RX ORDER — DOXYCYCLINE HYCLATE 100 MG/1
100 CAPSULE ORAL 2 TIMES DAILY
Qty: 20 CAPSULE | Refills: 0 | Status: SHIPPED | OUTPATIENT
Start: 2024-05-17 | End: 2024-05-27

## 2024-05-17 RX ORDER — AZITHROMYCIN 250 MG/1
TABLET, FILM COATED ORAL
Qty: 6 TABLET | Refills: 0 | Status: SHIPPED | OUTPATIENT
Start: 2024-05-17 | End: 2024-05-17 | Stop reason: ALTCHOICE

## 2024-05-17 RX ORDER — BENZONATATE 100 MG/1
100 CAPSULE ORAL 3 TIMES DAILY PRN
Qty: 21 CAPSULE | Refills: 0 | Status: SHIPPED | OUTPATIENT
Start: 2024-05-17 | End: 2024-05-24

## 2024-05-18 ENCOUNTER — TELEPHONE (OUTPATIENT)
Dept: FAMILY MEDICINE CLINIC | Facility: TELEHEALTH | Age: 29
End: 2024-05-18
Payer: COMMERCIAL

## 2024-05-18 NOTE — PROGRESS NOTES
You have chosen to receive care through a telehealth visit.  Do you consent to use a video/audio connection for your medical care today? Yes     CHIEF COMPLAINT  Chief Complaint   Patient presents with    Cough         HPI  Kylee Wells is a 29 y.o. female  presents with complaint of cough and congestion. Reports she has had symptoms for 2 weeks. Reports she is blowing greenish yellow from her nose. Reports no fever or chills. Reports she was seen at urgent care dx allergies. No nausea or vomiting. Reports no SOA or CP. Hoarse. Reports she has been taking mucinex that hasn't helped.     Review of Systems   Constitutional:  Negative for chills, fatigue and fever.   HENT:  Positive for congestion, sinus pressure and sinus pain. Negative for ear discharge, ear pain and sore throat.    Respiratory:  Positive for cough. Negative for chest tightness, shortness of breath and wheezing.    Cardiovascular:  Negative for chest pain.   Gastrointestinal:  Negative for abdominal pain, diarrhea, nausea and vomiting.   Musculoskeletal:  Negative for back pain and myalgias.   Neurological:  Negative for dizziness and headaches.   Psychiatric/Behavioral: Negative.         Past Medical History:   Diagnosis Date    Anemia     Anxiety     Asthma     Endometriosis     Uncomfirmed    Gestational diabetes     Migraine     Rapid heart beat     Urinary tract infection        Family History   Problem Relation Age of Onset    Breast cancer Maternal Grandmother     Colon cancer Maternal Grandmother     Breast cancer Maternal Grandfather     Lung cancer Maternal Grandfather        Social History     Socioeconomic History    Marital status:     Number of children: 2   Tobacco Use    Smoking status: Never    Smokeless tobacco: Never   Vaping Use    Vaping status: Never Used   Substance and Sexual Activity    Alcohol use: Never    Drug use: Never    Sexual activity: Yes     Partners: Male       Kylee Wells  reports that  she has never smoked. She has never used smokeless tobacco. I have educated her on the risk of diseases from using tobacco products such as cancer, COPD, and heart disease.         I spent  5  minutes counseling the patient.              LMP 04/08/2024   Breastfeeding No     PHYSICAL EXAM  Physical Exam   Constitutional: She is oriented to person, place, and time. She appears well-developed and well-nourished. No distress.   HENT:   Head: Normocephalic and atraumatic.   Right Ear: Hearing normal.   Left Ear: Hearing normal.   Nose: Rhinorrhea and congestion present. Right sinus exhibits maxillary sinus tenderness. Left sinus exhibits maxillary sinus tenderness.   Mouth/Throat: Mouth/Lips are normal.  Patient directed exam   Eyes: Conjunctivae and lids are normal.   Pulmonary/Chest: Effort normal.  No respiratory distress.  Neurological: She is alert and oriented to person, place, and time.   Psychiatric: She has a normal mood and affect. Her speech is normal and behavior is normal.       Results for orders placed or performed in visit on 05/08/24   POC Urinalysis Dipstick    Specimen: Urine   Result Value Ref Range    Color Yellow Yellow, Straw, Dark Yellow, Paulina    Clarity, UA Clear Clear    Glucose, UA Negative Negative mg/dL    Bilirubin Negative Negative    Ketones, UA Negative Negative    Specific Gravity  1.005 1.005 - 1.030    Blood, UA Large (A) Negative    pH, Urine 9.0 (A) 5.0 - 8.0    Protein, POC 1+ (A) Negative mg/dL    Urobilinogen, UA Normal Normal, 0.2 E.U./dL    Leukocytes Negative Negative    Nitrite, UA Negative Negative       Diagnoses and all orders for this visit:    1. Acute non-recurrent maxillary sinusitis (Primary)    2. Acute cough    3. History of asthma    Other orders  -     methylPREDNISolone (MEDROL) 4 MG dose pack; Take as directed on package instructions.  Dispense: 21 tablet; Refill: 0  -     benzonatate (Tessalon Perles) 100 MG capsule; Take 1 capsule by mouth 3 (Three) Times a  Day As Needed for Cough for up to 7 days.  Dispense: 21 capsule; Refill: 0  -     Discontinue: azithromycin (Zithromax Z-Robel) 250 MG tablet; Take 2 tablets by mouth on day 1, then 1 tablet daily on days 2-5  Dispense: 6 tablet; Refill: 0  -     doxycycline (VIBRAMYCIN) 100 MG capsule; Take 1 capsule by mouth 2 (Two) Times a Day for 10 days.  Dispense: 20 capsule; Refill: 0    Rest  Fluids  Patient has albuterol inhaler  PCP if symptoms continue  ER for any worsening symptoms such as high fever, chest pain or SOA       FOLLOW-UP  As discussed during visit with PCP/[facility] if no improvement or Urgent Care/Emergency Department if worsening of symptoms    Patient verbalizes understanding of medication dosage, comfort measures, instructions for treatment and follow-up.    Michaela Pennington, APRN  05/17/2024  20:21 EDT    The use of a video visit has been reviewed with the patient and verbal informed consent has been obtained. Myself and Kylee Wells participated in this visit. The patient is located in [address] [place] [address].    I am located in [place], [place]. Mychart and Twilio were utilized. I spent 5 minutes in the patient's chart for this visit.      Note Disclaimer: At [facility], we believe that sharing information builds trust and better   relationships. You are receiving this note because you recently visited [facility]. It is possible you   will see health information before a provider has talked with you about it. This kind of information can   be easy to misunderstand. To help you fully understand what it means for your health, we urge you to   discuss this note with your provider.

## 2024-05-18 NOTE — TELEPHONE ENCOUNTER
Called pharmacy and left message to d/c zpack and fill doxy 100 mg po bid x 10 days-was escribed and called in. Patient reports she was going to get this prescription this morning when [facility] opens on 5-18-24

## 2024-05-18 NOTE — PATIENT INSTRUCTIONS
Sinus Infection, Adult  A sinus infection, also called sinusitis, is inflammation of your sinuses. Sinuses are hollow spaces in the bones around your face. Your sinuses are located:  Around your eyes.  In the middle of your forehead.  Behind your nose.  In your cheekbones.  Mucus normally drains out of your sinuses. When your nasal tissues become inflamed or swollen, mucus can become trapped or blocked. This allows bacteria, viruses, and fungi to grow, which leads to infection. Most infections of the sinuses are caused by a virus.  A sinus infection can develop quickly. It can last for up to 4 weeks (acute) or for more than 12 weeks (chronic). A sinus infection often develops after a cold.  What are the causes?  This condition is caused by anything that creates swelling in the sinuses or stops mucus from draining. This includes:  Allergies.  Asthma.  Infection from bacteria or viruses.  Deformities or blockages in your nose or sinuses.  Abnormal growths in the nose (nasal polyps).  Pollutants, such as chemicals or irritants in the air.  Infection from fungi. This is rare.  What increases the risk?  You are more likely to develop this condition if you:  Have a weak body defense system (immune system).  Do a lot of swimming or diving.  Overuse nasal sprays.  Smoke.  What are the signs or symptoms?  The main symptoms of this condition are pain and a feeling of pressure around the affected sinuses. Other symptoms include:  Stuffy nose or congestion that makes it difficult to breathe through your nose.  Thick yellow or greenish drainage from your nose.  Tenderness, swelling, and warmth over the affected sinuses.  A cough that may get worse at night.  Decreased sense of smell and taste.  Extra mucus that collects in the throat or the back of the nose (postnasal drip) causing a sore throat or bad breath.  Tiredness (fatigue).  Fever.  How is this diagnosed?  This condition is diagnosed based on:  Your symptoms.  Your  medical history.  A physical exam.  Tests to find out if your condition is acute or chronic. This may include:  Checking your nose for nasal polyps.  Viewing your sinuses using a device that has a light (endoscope).  Testing for allergies or bacteria.  Imaging tests, such as an MRI or CT scan.  In rare cases, a bone biopsy may be done to rule out more serious types of fungal sinus disease.  How is this treated?  Treatment for a sinus infection depends on the cause and whether your condition is chronic or acute.  If caused by a virus, your symptoms should go away on their own within 10 days. You may be given medicines to relieve symptoms. They include:  Medicines that shrink swollen nasal passages (decongestants).  A spray that eases inflammation of the nostrils (topical intranasal corticosteroids).  Rinses that help get rid of thick mucus in your nose (nasal saline washes).  Medicines that treat allergies (antihistamines).  Over-the-counter pain relievers.  If caused by bacteria, your health care provider may recommend waiting to see if your symptoms improve. Most bacterial infections will get better without antibiotic medicine. You may be given antibiotics if you have:  A severe infection.  A weak immune system.  If caused by narrow nasal passages or nasal polyps, surgery may be needed.  Follow these instructions at home:  Medicines  Take, use, or apply over-the-counter and prescription medicines only as told by your health care provider. These may include nasal sprays.  If you were prescribed an antibiotic medicine, take it as told by your health care provider. Do not stop taking the antibiotic even if you start to feel better.  Hydrate and humidify    Drink enough fluid to keep your urine pale yellow. Staying hydrated will help to thin your mucus.  Use a cool mist humidifier to keep the humidity level in your home above 50%.  Inhale steam for 10-15 minutes, 3-4 times a day, or as told by your health care  provider. You can do this in the bathroom while a hot shower is running.  Limit your exposure to cool or dry air.  Rest  Rest as much as possible.  Sleep with your head raised (elevated).  Make sure you get enough sleep each night.  General instructions    Apply a warm, moist washcloth to your face 3-4 times a day or as told by your health care provider. This will help with discomfort.  Use nasal saline washes as often as told by your health care provider.  Wash your hands often with soap and water to reduce your exposure to germs. If soap and water are not available, use hand .  Do not smoke. Avoid being around people who are smoking (secondhand smoke).  Keep all follow-up visits. This is important.  Contact a health care provider if:  You have a fever.  Your symptoms get worse.  Your symptoms do not improve within 10 days.  Get help right away if:  You have a severe headache.  You have persistent vomiting.  You have severe pain or swelling around your face or eyes.  You have vision problems.  You develop confusion.  Your neck is stiff.  You have trouble breathing.  These symptoms may be an emergency. Get help right away. Call 911.  Do not wait to see if the symptoms will go away.  Do not drive yourself to the hospital.  Summary  A sinus infection is soreness and inflammation of your sinuses. Sinuses are hollow spaces in the bones around your face.  This condition is caused by nasal tissues that become inflamed or swollen. The swelling traps or blocks the flow of mucus. This allows bacteria, viruses, and fungi to grow, which leads to infection.  If you were prescribed an antibiotic medicine, take it as told by your health care provider. Do not stop taking the antibiotic even if you start to feel better.  Keep all follow-up visits. This is important.  This information is not intended to replace advice given to you by your health care provider. Make sure you discuss any questions you have with your health  care provider.  Document Revised: 11/22/2022 Document Reviewed: 11/22/2022  [employer] Patient Education © 2024 [employer]  Cough, Adult  Coughing is a reflex that clears your throat and airways (respiratory system). It helps heal and protect your lungs. It is normal to cough from time to time. A cough that happens with other symptoms or that lasts a long time may be a sign of a condition that needs treatment. A short-term (acute) cough may only last 2-3 weeks. A long-term (chronic) cough may last 8 or more weeks.  Coughing is often caused by:  Diseases, such as:  An infection of the respiratory system.  Asthma or other heart or lung diseases.  Gastroesophageal reflux. This is when acid comes back up from the stomach.  Breathing in things that irritate your lungs.  Allergies.  Postnasal drip. This is when mucus runs down the back of your throat.  Smoking.  Some medicines.  Follow these instructions at home:  Medicines  Take over-the-counter and prescription medicines only as told by your health care provider.  Talk with your provider before you take cough medicine (cough suppressants).  Eating and drinking  Do not drink alcohol.  Avoid caffeine.  Drink enough fluid to keep your pee (urine) pale yellow.  Lifestyle  Avoid cigarette smoke.  Do not use any products that contain nicotine or tobacco. These products include cigarettes, chewing tobacco, and vaping devices, such as e-cigarettes. If you need help quitting, ask your provider.  Avoid things that make you cough. These may include perfumes, candles, cleaning products, or campfire smoke.  General instructions    Watch for any changes to your cough. Tell your provider about them.  Always cover your mouth when you cough.  If the air is dry in your bedroom or home, use a cool mist vaporizer or humidifier.  If your cough is worse at night, try to sleep in a semi-upright position.  Rest as needed.  Contact a health care provider if:  You have new symptoms, or your  symptoms get worse.  You cough up pus.  You have a fever that does not go away or a cough that does not get better after 2-3 weeks.  You cannot control your cough with medicine, and you are losing sleep.  You have pain that gets worse or is not helped with medicine.  You lose weight for no clear reason.  You have night sweats.  Get help right away if:  You cough up blood.  You have trouble breathing.  Your heart is beating very fast.  These symptoms may be an emergency. Get help right away. Call 911.  Do not wait to see if the symptoms will go away.  Do not drive yourself to the hospital.  This information is not intended to replace advice given to you by your health care provider. Make sure you discuss any questions you have with your health care provider.  Document Revised: 08/18/2023 Document Reviewed: 08/18/2023  Elsevier Patient Education © 2024 [employer]

## 2024-05-29 ENCOUNTER — OFFICE VISIT (OUTPATIENT)
Dept: OBSTETRICS AND GYNECOLOGY | Facility: CLINIC | Age: 29
End: 2024-05-29
Payer: COMMERCIAL

## 2024-05-29 VITALS
BODY MASS INDEX: 42.11 KG/M2 | HEIGHT: 66 IN | SYSTOLIC BLOOD PRESSURE: 122 MMHG | DIASTOLIC BLOOD PRESSURE: 70 MMHG | WEIGHT: 262 LBS

## 2024-05-29 DIAGNOSIS — Z13.89 SCREENING FOR GENITOURINARY CONDITION: ICD-10-CM

## 2024-05-29 DIAGNOSIS — Z30.430 ENCOUNTER FOR IUD INSERTION: Primary | ICD-10-CM

## 2024-05-29 LAB
B-HCG UR QL: NEGATIVE
BILIRUB BLD-MCNC: NEGATIVE MG/DL
CLARITY, POC: CLEAR
COLOR UR: YELLOW
EXPIRATION DATE: NORMAL
GLUCOSE UR STRIP-MCNC: NEGATIVE MG/DL
INTERNAL NEGATIVE CONTROL: NORMAL
INTERNAL POSITIVE CONTROL: NORMAL
KETONES UR QL: NEGATIVE
LEUKOCYTE EST, POC: NEGATIVE
Lab: NORMAL
NITRITE UR-MCNC: NEGATIVE MG/ML
PH UR: 5 [PH] (ref 5–8)
PROT UR STRIP-MCNC: NEGATIVE MG/DL
RBC # UR STRIP: NEGATIVE /UL
SP GR UR: 1 (ref 1–1.03)
UROBILINOGEN UR QL: NORMAL

## 2024-05-29 RX ORDER — FERROUS GLUCONATE 324(38)MG
1 TABLET ORAL 2 TIMES DAILY WITH MEALS
COMMUNITY
Start: 2024-05-12

## 2024-05-29 NOTE — PROGRESS NOTES
Procedure: Intrauterine device insertion     LNMP: Started last week, can not remember exact date   Pregnancy Test: neg  Current Contraception Method: nothing      Chief Complaint: IUD insertion.  Patient presents for IUD insertion.  She appears stable today and agrees to proceed with IUD placement.      Pre procedure indication 1) Desires Mirena  Post procedure indication 1) Desires Mirena    The risks, benefits, and alternatives to IUD were explained at length with the patient. All her questions were answered and consents were signed.    The patient was placed in a dorsal lithotomy position on the examining table in [facility]. A bimanual exam confirmed the uterus was normal in size, AC.  A warmed metal speculum was inserted into the vagina and the cervix was brought into view.    The cervix was prepped with Betadine. A tenaculum was used to grasp the cervix. The endometrial cavity was then sounded to 8 cm without use of a dilator.    The  was then carefully advanced to the cervical canal into the uterus to the level of the fundus. This was then backed off about 1.5-2 cm to allow sufficient space for the arms to open. The device was deployed. The  was removed carefully from the uterus. The threads were then cut leaving 2-3 cm visible outside of the cervix.   Good hemostasis was noted.     All other instruments were removed from the vagina. There were no complications.  The patient tolerated the procedure well with a minimal amount of discomfort.    The patient was counseled about the need to return in 4 weeks for string check.     She was counseled about the need to use a backup method of contraception such as condoms for 1-2 weeks. The patient is counseled to contact us if she has any significant or increasing bleeding, pain, fever, chills, or other concerns. She is instructed to see a doctor right away if she believes that she may be pregnant at any time with the IUD in place.    Jinny Meade,  APRN    5/29/2024  11:20 EDT

## 2024-07-01 ENCOUNTER — OFFICE VISIT (OUTPATIENT)
Dept: OBSTETRICS AND GYNECOLOGY | Facility: CLINIC | Age: 29
End: 2024-07-01
Payer: COMMERCIAL

## 2024-07-01 VITALS
BODY MASS INDEX: 43.71 KG/M2 | HEIGHT: 66 IN | SYSTOLIC BLOOD PRESSURE: 112 MMHG | DIASTOLIC BLOOD PRESSURE: 70 MMHG | WEIGHT: 272 LBS

## 2024-07-01 DIAGNOSIS — Z30.431 IUD CHECK UP: ICD-10-CM

## 2024-07-01 DIAGNOSIS — Z13.89 SCREENING FOR GENITOURINARY CONDITION: Primary | ICD-10-CM

## 2024-07-01 RX ORDER — FLUOXETINE HYDROCHLORIDE 40 MG/1
40 CAPSULE ORAL EVERY MORNING
Qty: 30 CAPSULE | Refills: 0 | Status: SHIPPED | OUTPATIENT
Start: 2024-07-01

## 2024-07-01 NOTE — PROGRESS NOTES
S: Patient present today for her IUD string check. She has the Mirena and it was placed on 5/29/24. She reports irregular periods since placement. She has not tried to feel her IUD strings. She is happy with the IUD.     She is asking for a refill of prozac. She has an upcoming appt with her PCP but reports she ran out of her rx and they will not fill it until she is seen.     O: UPT: unable to void   Physical Exam  Vitals and nursing note reviewed.   Constitutional:       Appearance: Normal appearance.   Genitourinary:     Labia:         Right: No rash, tenderness or lesion.         Left: No rash, tenderness or lesion.       Vagina: No signs of injury and foreign body. No vaginal discharge, erythema, tenderness or bleeding.      Cervix: No cervical motion tenderness, discharge, friability, lesion, erythema or cervical bleeding.      Comments: IUD strings seen on exam   Neurological:      Mental Status: She is alert.         A: IUD check     P: Rev IUD string checks and when to perform. Instructed to call office with any concerns. RX prozac sent for 30- day supply. PCP can refill at her upcoming visit.     RTO PRN and for AE 9/24     Jinny Meade, ARNOL  7/1/2024  14:43 EDT